# Patient Record
Sex: MALE | Race: WHITE | NOT HISPANIC OR LATINO | Employment: FULL TIME | URBAN - NONMETROPOLITAN AREA
[De-identification: names, ages, dates, MRNs, and addresses within clinical notes are randomized per-mention and may not be internally consistent; named-entity substitution may affect disease eponyms.]

---

## 2017-11-28 ENCOUNTER — OFFICE VISIT (OUTPATIENT)
Dept: URGENT CARE | Facility: PHYSICIAN GROUP | Age: 62
End: 2017-11-28
Payer: OTHER GOVERNMENT

## 2017-11-28 VITALS
BODY MASS INDEX: 45.04 KG/M2 | HEART RATE: 108 BPM | TEMPERATURE: 98.3 F | SYSTOLIC BLOOD PRESSURE: 130 MMHG | WEIGHT: 287 LBS | RESPIRATION RATE: 16 BRPM | OXYGEN SATURATION: 97 % | DIASTOLIC BLOOD PRESSURE: 80 MMHG | HEIGHT: 67 IN

## 2017-11-28 DIAGNOSIS — R05.9 COUGH: ICD-10-CM

## 2017-11-28 PROCEDURE — 99203 OFFICE O/P NEW LOW 30 MIN: CPT | Performed by: PHYSICIAN ASSISTANT

## 2017-11-28 RX ORDER — BENZONATATE 200 MG/1
200 CAPSULE ORAL 3 TIMES DAILY PRN
Qty: 30 CAP | Refills: 0 | Status: SHIPPED | OUTPATIENT
Start: 2017-11-28 | End: 2019-04-24

## 2017-11-28 ASSESSMENT — ENCOUNTER SYMPTOMS
MYALGIAS: 0
SHORTNESS OF BREATH: 0
FEVER: 0
COUGH: 1
CHILLS: 0
WHEEZING: 0

## 2017-11-28 NOTE — PROGRESS NOTES
Subjective:      Jarek Gonzalez is a 62 y.o. male who presents with Cough (x6days congestion)            Cough for the last 6 days or so with mild congestion. No fevers, chills, shortness of breath, wheezing, or other complaints. He has not tried any interventions.      Cough   This is a new problem. The current episode started in the past 7 days. The problem has been waxing and waning. The cough is non-productive. Pertinent negatives include no chills, fever, myalgias, shortness of breath or wheezing. Nothing aggravates the symptoms. He has tried nothing for the symptoms. The treatment provided no relief.       Review of Systems   Constitutional: Negative for chills and fever.   HENT: Positive for congestion.    Respiratory: Positive for cough. Negative for shortness of breath and wheezing.    Musculoskeletal: Negative for myalgias.     Allergies:Patient has no known allergies.    Current Outpatient Prescriptions Ordered in [facility]   Medication Sig Dispense Refill   • benzonatate (TESSALON) 200 MG capsule Take 1 Cap by mouth 3 times a day as needed for Cough. 30 Cap 0   • duloxetine (CYMBALTA) 30 MG CPEP Take 2 Caps by mouth every day. 30 Each 3   • hydrocodone-acetaminophen (NORCO) 5-325 MG TABS per tablet Take 1-2 Tabs by mouth every 6 hours as needed. 240 Each 0   • lisinopril (PRINIVIL, ZESTRIL) 40 MG tablet Take 1 Tab by mouth every day. 90 Each 3   • tramadol (ULTRAM) 50 MG TABS Take 1-2 Tabs by mouth every 8 hours as needed for Mild Pain. 30 Each 0     No current [facility]-ordered facility-administered medications on file.        Past Medical History:   Diagnosis Date   • BPH (benign prostatic hyperplasia)    • Chronic low back pain     DDD   • DDD (degenerative disc disease)    • Depression    • Hypertension    • Morbid obesity (CMS-HCC)    • Nephrolithiasis        Social History   Substance Use Topics   • Smoking status: Never Smoker   • Smokeless tobacco: Never Used   • Alcohol use 0.5 oz/week     1 Shots of  "liquor per week      Comment: day       Family Status   Relation Status   • Mother    • Father      Family History   Problem Relation Age of Onset   • Lung Disease Mother    • Heart Disease Father    • Lung Disease Father    • Alcohol/Drug Father           Objective:     /80   Pulse (!) 108   Temp 36.8 °C (98.3 °F)   Resp 16   Ht 1.702 m (5' 7\")   Wt (!) 130.2 kg (287 lb)   SpO2 97%   BMI 44.95 kg/m²      Physical Exam   Constitutional: He is oriented to person, place, and time. He appears well-developed and well-nourished. No distress.   HENT:   Head: Normocephalic and atraumatic.   Right Ear: External ear normal.   Left Ear: External ear normal.   Mouth/Throat: Oropharynx is clear and moist.   Eyes: Right eye exhibits no discharge. Left eye exhibits no discharge.   Neck: Normal range of motion. Neck supple.   Cardiovascular: Normal rate and regular rhythm.    Pulmonary/Chest: Effort normal and breath sounds normal. He has no wheezes. He has no rales.   Neurological: He is alert and oriented to person, place, and time.   Skin: Skin is warm and dry. He is not diaphoretic.   Psychiatric: He has a normal mood and affect. His behavior is normal. Judgment and thought content normal.   Nursing note and vitals reviewed.              Assessment/Plan:     1. Cough  benzonatate (TESSALON) 200 MG capsule    Ongoing for a few days. No shortness of breath or wheezing. Lungs clear. Given Tessalon. Follow-up with PCP       [employer] Interactive Patient Education given: Cough    Please note that this dictation was created using voice recognition software. I have made every reasonable attempt to correct obvious errors, but I expect that there are errors of grammar and possibly content that I did not discover before finalizing the note.    "

## 2017-11-28 NOTE — PATIENT INSTRUCTIONS
Cough, Adult   A cough is a reflex that helps clear your throat and airways. It can help heal the body or may be a reaction to an irritated airway. A cough may only last 2 or 3 weeks (acute) or may last more than 8 weeks (chronic).   CAUSES  Acute cough:  · Viral or bacterial infections.  Chronic cough:  · Infections.  · Allergies.  · Asthma.  · Post-nasal drip.  · Smoking.  · Heartburn or acid reflux.  · Some medicines.  · Chronic lung problems (COPD).  · Cancer.  SYMPTOMS   · Cough.  · Fever.  · Chest pain.  · Increased breathing rate.  · High-pitched whistling sound when breathing (wheezing).  · Colored mucus that you cough up (sputum).  TREATMENT   · A bacterial cough may be treated with antibiotic medicine.  · A viral cough must run its course and will not respond to antibiotics.  · Your caregiver may recommend other treatments if you have a chronic cough.  HOME CARE INSTRUCTIONS   · Only take over-the-counter or prescription medicines for pain, discomfort, or fever as directed by your caregiver. Use cough suppressants only as directed by your caregiver.  · Use a cold steam vaporizer or humidifier in your bedroom or home to help loosen secretions.  · Sleep in a semi-upright position if your cough is worse at night.  · Rest as needed.  · Stop smoking if you smoke.  SEEK IMMEDIATE MEDICAL CARE IF:   · You have pus in your sputum.  · Your cough starts to worsen.  · You cannot control your cough with suppressants and are losing sleep.  · You begin coughing up blood.  · You have difficulty breathing.  · You develop pain which is getting worse or is uncontrolled with medicine.  · You have a fever.  MAKE SURE YOU:   · Understand these instructions.  · Will watch your condition.  · Will get help right away if you are not doing well or get worse.     This information is not intended to replace advice given to you by your health care provider. Make sure you discuss any questions you have with your health care provider.      Document Released: 06/15/2012 Document Revised: 03/11/2013 Document Reviewed: 02/24/2016  [employer] Interactive Patient Education ©2016 [employer]

## 2019-02-21 ENCOUNTER — HOSPITAL ENCOUNTER (EMERGENCY)
Dept: HOSPITAL 8 - ED | Age: 64
Discharge: LEFT BEFORE DISCHARGE | End: 2019-02-21
Payer: OTHER GOVERNMENT

## 2019-02-21 VITALS — BODY MASS INDEX: 43.44 KG/M2 | WEIGHT: 286.6 LBS | HEIGHT: 68 IN

## 2019-02-21 VITALS — SYSTOLIC BLOOD PRESSURE: 98 MMHG | DIASTOLIC BLOOD PRESSURE: 52 MMHG

## 2019-02-21 DIAGNOSIS — F10.20: ICD-10-CM
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
## 2019-02-21 DIAGNOSIS — R06.89: ICD-10-CM

## 2019-02-21 DIAGNOSIS — R55: Primary | ICD-10-CM

## 2019-02-21 DIAGNOSIS — N18.9: ICD-10-CM

## 2019-02-21 DIAGNOSIS — E11.22: ICD-10-CM

## 2019-02-21 DIAGNOSIS — R53.1: ICD-10-CM

## 2019-02-21 LAB
ALBUMIN SERPL-MCNC: 3.3 G/DL (ref 3.4–5)
ALP SERPL-CCNC: 96 U/L (ref 45–117)
ALT SERPL-CCNC: 50 U/L (ref 12–78)
ANION GAP SERPL CALC-SCNC: 15 MMOL/L (ref 5–15)
APTT BLD: 29 SECONDS (ref 25–31)
BASOPHILS # BLD AUTO: 0.02 X10^3/UL (ref 0–0.1)
BASOPHILS NFR BLD AUTO: 0 % (ref 0–1)
BILIRUB SERPL-MCNC: 1.2 MG/DL (ref 0.2–1)
CALCIUM SERPL-MCNC: 8.4 MG/DL (ref 8.5–10.1)
CHLORIDE SERPL-SCNC: 93 MMOL/L (ref 98–107)
CREAT SERPL-MCNC: 2.23 MG/DL (ref 0.7–1.3)
CULTURE INDICATED?: NO
EOSINOPHIL # BLD AUTO: 0.03 X10^3/UL (ref 0–0.4)
EOSINOPHIL NFR BLD AUTO: 0 % (ref 1–7)
ERYTHROCYTE [DISTWIDTH] IN BLOOD BY AUTOMATED COUNT: 14.3 % (ref 9.4–14.8)
INR PPP: 1.13 (ref 0.93–1.1)
LYMPHOCYTES # BLD AUTO: 0.54 X10^3/UL (ref 1–3.4)
LYMPHOCYTES NFR BLD AUTO: 6 % (ref 22–44)
MCH RBC QN AUTO: 33.8 PG (ref 27.5–34.5)
MCHC RBC AUTO-ENTMCNC: 33.5 G/DL (ref 33.2–36.2)
MCV RBC AUTO: 100.7 FL (ref 81–97)
MD: NO
MICROSCOPIC: (no result)
MONOCYTES # BLD AUTO: 0.56 X10^3/UL (ref 0.2–0.8)
MONOCYTES NFR BLD AUTO: 6 % (ref 2–9)
NEUTROPHILS # BLD AUTO: 8.34 X10^3/UL (ref 1.8–6.8)
NEUTROPHILS NFR BLD AUTO: 88 % (ref 42–75)
PLATELET # BLD AUTO: 262 X10^3/UL (ref 130–400)
PMV BLD AUTO: 7.9 FL (ref 7.4–10.4)
PROT SERPL-MCNC: 7.1 G/DL (ref 6.4–8.2)
PROTHROMBIN TIME: 11.9 SECONDS (ref 9.6–11.5)
RBC # BLD AUTO: 4.04 X10^6/UL (ref 4.38–5.82)
TROPONIN I SERPL-MCNC: 0.05 NG/ML (ref 0–0.04)

## 2019-02-21 PROCEDURE — 84484 ASSAY OF TROPONIN QUANT: CPT

## 2019-02-21 PROCEDURE — 83880 ASSAY OF NATRIURETIC PEPTIDE: CPT

## 2019-02-21 PROCEDURE — 85610 PROTHROMBIN TIME: CPT

## 2019-02-21 PROCEDURE — 81003 URINALYSIS AUTO W/O SCOPE: CPT

## 2019-02-21 PROCEDURE — 36415 COLL VENOUS BLD VENIPUNCTURE: CPT

## 2019-02-21 PROCEDURE — 85730 THROMBOPLASTIN TIME PARTIAL: CPT

## 2019-02-21 PROCEDURE — 71045 X-RAY EXAM CHEST 1 VIEW: CPT

## 2019-02-21 PROCEDURE — 93005 ELECTROCARDIOGRAM TRACING: CPT

## 2019-02-21 PROCEDURE — 80053 COMPREHEN METABOLIC PANEL: CPT

## 2019-02-21 PROCEDURE — 99284 EMERGENCY DEPT VISIT MOD MDM: CPT

## 2019-02-21 PROCEDURE — 76700 US EXAM ABDOM COMPLETE: CPT

## 2019-02-21 PROCEDURE — 85025 COMPLETE CBC W/AUTO DIFF WBC: CPT

## 2019-02-21 PROCEDURE — 83690 ASSAY OF LIPASE: CPT

## 2019-04-18 ENCOUNTER — OFFICE VISIT (OUTPATIENT)
Dept: URGENT CARE | Facility: PHYSICIAN GROUP | Age: 64
End: 2019-04-18
Payer: OTHER GOVERNMENT

## 2019-04-18 VITALS
WEIGHT: 263 LBS | OXYGEN SATURATION: 92 % | TEMPERATURE: 97.9 F | BODY MASS INDEX: 41.28 KG/M2 | RESPIRATION RATE: 18 BRPM | HEIGHT: 67 IN | DIASTOLIC BLOOD PRESSURE: 82 MMHG | HEART RATE: 83 BPM | SYSTOLIC BLOOD PRESSURE: 128 MMHG

## 2019-04-18 DIAGNOSIS — H10.33 ACUTE CONJUNCTIVITIS OF BOTH EYES, UNSPECIFIED ACUTE CONJUNCTIVITIS TYPE: ICD-10-CM

## 2019-04-18 DIAGNOSIS — H57.89 EYE IRRITATION: ICD-10-CM

## 2019-04-18 PROCEDURE — 99214 OFFICE O/P EST MOD 30 MIN: CPT | Performed by: PHYSICIAN ASSISTANT

## 2019-04-18 RX ORDER — POLYMYXIN B SULFATE AND TRIMETHOPRIM 1; 10000 MG/ML; [USP'U]/ML
1 SOLUTION OPHTHALMIC EVERY 4 HOURS
Qty: 10 ML | Refills: 0 | Status: SHIPPED | OUTPATIENT
Start: 2019-04-18 | End: 2019-04-24

## 2019-04-18 ASSESSMENT — ENCOUNTER SYMPTOMS
EYE DISCHARGE: 1
NAUSEA: 0
PHOTOPHOBIA: 0
EYE PAIN: 0
BLURRED VISION: 0
ABDOMINAL PAIN: 0
WHEEZING: 0
SPUTUM PRODUCTION: 0
DIARRHEA: 0
SORE THROAT: 0
SHORTNESS OF BREATH: 0
VOMITING: 0
EYE REDNESS: 0
CHILLS: 0
FEVER: 0
DOUBLE VISION: 0
COUGH: 0

## 2019-04-18 ASSESSMENT — VISUAL ACUITY
OD_CC: 20/40
OS_CC: 20/40

## 2019-04-18 NOTE — PROGRESS NOTES
"Subjective:   Jarek Gonzalez is a 64 y.o. male who presents for Eye Drainage (is using Visine Allergy Eye Drops) and Burning Eyes         Eye Drainage    Associated symptoms include congestion. Pertinent negatives include no abdominal pain, chills, coughing, fever, nausea, rash, sore throat or vomiting.     Pt seen in the , c/o irritation / burning to bilateral eyes, did speak to pharmacist who recommended trial of lubricating drops, this helped w/ irritation/itch and redness but purulent discharge perists, notes matting of eyelids each am w/ green / yellow d/c - denies vision changes or eye pain. Denies photosensitivity. Pt denies known exposure to pink eye or children.     Review of Systems   Constitutional: Negative for chills and fever.   HENT: Positive for congestion. Negative for ear pain and sore throat.    Eyes: Positive for discharge. Negative for blurred vision, double vision, photophobia, pain and redness.        Itchy   Respiratory: Negative for cough, sputum production, shortness of breath and wheezing.    Gastrointestinal: Negative for abdominal pain, diarrhea, nausea and vomiting.   Skin: Negative for rash.   Endo/Heme/Allergies: Positive for environmental allergies.     No Known Allergies   I have worn a mask for the entire encounter with this patient.    Objective:   /82   Pulse 83   Temp 36.6 °C (97.9 °F) (Temporal)   Resp 18   Ht 1.695 m (5' 6.75\")   Wt 119.3 kg (263 lb)   SpO2 92%   BMI 41.50 kg/m²    Physical Exam   Constitutional: He is oriented to person, place, and time. He appears well-developed and well-nourished. No distress.   HENT:   Head: Normocephalic and atraumatic.   Right Ear: Tympanic membrane, external ear and ear canal normal.   Left Ear: Tympanic membrane, external ear and ear canal normal.   Nose: Nose normal.   Mouth/Throat: Uvula is midline and mucous membranes are normal. Posterior oropharyngeal erythema ( mild PND ) present. No oropharyngeal exudate, " posterior oropharyngeal edema or tonsillar abscesses.   Eyes: Pupils are equal, round, and reactive to light. Conjunctivae, EOM and lids are normal. Right eye exhibits exudate. Right eye exhibits no chemosis, no discharge and no hordeolum. No foreign body present in the right eye. Left eye exhibits exudate. Left eye exhibits no chemosis, no discharge and no hordeolum. No foreign body present in the left eye. Right conjunctiva has no hemorrhage. Left conjunctiva has no hemorrhage. No scleral icterus.   Neck: Neck supple.   Pulmonary/Chest: Effort normal. No respiratory distress. He has no decreased breath sounds. He has no wheezes. He has no rhonchi. He has no rales.   Musculoskeletal: Normal range of motion.   Lymphadenopathy:     He has no cervical adenopathy.   Neurological: He is alert and oriented to person, place, and time. Coordination normal.   Skin: Skin is warm and dry. He is not diaphoretic. No pallor.   Psychiatric: He has a normal mood and affect.   Nursing note and vitals reviewed.        Assessment/Plan:   1. Eye irritation  - REFERRAL TO OPHTHALMOLOGY    2. Acute conjunctivitis of both eyes, unspecified acute conjunctivitis type  - polymixin-trimethoprim (POLYTRIM) 56508-3.1 UNIT/ML-% Solution; Place 1 Drop in both eyes every 4 hours for 7 days.  Dispense: 10 mL; Refill: 0  Supportive care is reviewed with patient/caregiver - recommend to push PO fluids and electrolytes, suspect simple conjunctivitis based Patient's description of recent symptoms -somewhat atypical with less conjunctival injection -certainly not classic appearance or overtly concerned but thoughtful of a heliotrope eruption -again suspect bacterial conjunctivitis will treat accordingly -patient to follow-up with ophthalmology with lack of resolution     Return to clinic with lack of resolution or progression of symptoms.  ER precautions with any worsening symptoms are reviewed with patient/caregiver and they do express  understanding    Differential diagnosis, natural history, supportive care, and indications for immediate follow-up discussed.

## 2019-04-24 ENCOUNTER — APPOINTMENT (OUTPATIENT)
Dept: RADIOLOGY | Facility: MEDICAL CENTER | Age: 64
DRG: 896 | End: 2019-04-24
Attending: EMERGENCY MEDICINE

## 2019-04-24 ENCOUNTER — HOSPITAL ENCOUNTER (INPATIENT)
Facility: MEDICAL CENTER | Age: 64
LOS: 9 days | DRG: 896 | End: 2019-05-03
Attending: EMERGENCY MEDICINE | Admitting: INTERNAL MEDICINE
Payer: OTHER GOVERNMENT
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
## 2019-04-24 DIAGNOSIS — I48.91 NEW ONSET ATRIAL FIBRILLATION (HCC): ICD-10-CM

## 2019-04-24 DIAGNOSIS — R79.89 ELEVATED TROPONIN: ICD-10-CM

## 2019-04-24 DIAGNOSIS — S00.83XA CONTUSION OF FACE, INITIAL ENCOUNTER: ICD-10-CM

## 2019-04-24 DIAGNOSIS — I48.91 ATRIAL FIBRILLATION WITH RVR (HCC): ICD-10-CM

## 2019-04-24 DIAGNOSIS — I10 ESSENTIAL HYPERTENSION: ICD-10-CM

## 2019-04-24 DIAGNOSIS — F10.920 ALCOHOLIC INTOXICATION WITHOUT COMPLICATION (HCC): ICD-10-CM

## 2019-04-24 DIAGNOSIS — S09.90XA CLOSED HEAD INJURY, INITIAL ENCOUNTER: ICD-10-CM

## 2019-04-24 DIAGNOSIS — S22.42XA CLOSED FRACTURE OF MULTIPLE RIBS OF LEFT SIDE, INITIAL ENCOUNTER: ICD-10-CM

## 2019-04-24 PROBLEM — R91.1 LUNG NODULE: Status: ACTIVE | Noted: 2019-04-24

## 2019-04-24 PROBLEM — E87.6 HYPOKALEMIA: Status: ACTIVE | Noted: 2019-04-24

## 2019-04-24 PROBLEM — E11.42 TYPE 2 DIABETES MELLITUS WITH DIABETIC POLYNEUROPATHY, WITHOUT LONG-TERM CURRENT USE OF INSULIN (HCC): Status: ACTIVE | Noted: 2019-04-24

## 2019-04-24 PROBLEM — S22.42XD CLOSED FRACTURE OF MULTIPLE RIBS OF LEFT SIDE WITH ROUTINE HEALING: Status: ACTIVE | Noted: 2019-04-24

## 2019-04-24 PROBLEM — R74.01 TRANSAMINITIS: Status: ACTIVE | Noted: 2019-04-24

## 2019-04-24 PROBLEM — R65.10 SIRS (SYSTEMIC INFLAMMATORY RESPONSE SYNDROME) (HCC): Status: ACTIVE | Noted: 2019-04-24

## 2019-04-24 PROBLEM — E87.29 INCREASED ANION GAP METABOLIC ACIDOSIS: Status: ACTIVE | Noted: 2019-04-24

## 2019-04-24 LAB
ALBUMIN SERPL BCP-MCNC: 3.8 G/DL (ref 3.2–4.9)
ALBUMIN/GLOB SERPL: 1.1 G/DL
ALP SERPL-CCNC: 91 U/L (ref 30–99)
ALT SERPL-CCNC: 52 U/L (ref 2–50)
ANION GAP SERPL CALC-SCNC: 19 MMOL/L (ref 0–11.9)
APPEARANCE UR: CLEAR
APTT PPP: 29.9 SEC (ref 24.7–36)
AST SERPL-CCNC: 111 U/L (ref 12–45)
B-OH-BUTYR SERPL-MCNC: 0.38 MMOL/L (ref 0.02–0.27)
BASOPHILS # BLD AUTO: 0.9 % (ref 0–1.8)
BASOPHILS # BLD: 0.06 K/UL (ref 0–0.12)
BILIRUB SERPL-MCNC: 1.2 MG/DL (ref 0.1–1.5)
BILIRUB UR QL STRIP.AUTO: NEGATIVE
BLOOD CULTURE HOLD CXBCH: NORMAL
BLOOD CULTURE HOLD CXBCH: NORMAL
BUN SERPL-MCNC: 3 MG/DL (ref 8–22)
CALCIUM SERPL-MCNC: 7.7 MG/DL (ref 8.5–10.5)
CHLORIDE SERPL-SCNC: 101 MMOL/L (ref 96–112)
CK SERPL-CCNC: 54 U/L (ref 0–154)
CO2 SERPL-SCNC: 18 MMOL/L (ref 20–33)
COLOR UR: YELLOW
CREAT SERPL-MCNC: 0.64 MG/DL (ref 0.5–1.4)
EKG IMPRESSION: NORMAL
EOSINOPHIL # BLD AUTO: 0.01 K/UL (ref 0–0.51)
EOSINOPHIL NFR BLD: 0.1 % (ref 0–6.9)
ERYTHROCYTE [DISTWIDTH] IN BLOOD BY AUTOMATED COUNT: 52.1 FL (ref 35.9–50)
ETHANOL BLD-MCNC: 0.15 G/DL
GLOBULIN SER CALC-MCNC: 3.4 G/DL (ref 1.9–3.5)
GLUCOSE BLD-MCNC: 129 MG/DL (ref 65–99)
GLUCOSE BLD-MCNC: 130 MG/DL (ref 65–99)
GLUCOSE SERPL-MCNC: 192 MG/DL (ref 65–99)
GLUCOSE UR STRIP.AUTO-MCNC: NEGATIVE MG/DL
HCT VFR BLD AUTO: 45.3 % (ref 42–52)
HGB BLD-MCNC: 15.3 G/DL (ref 14–18)
IMM GRANULOCYTES # BLD AUTO: 0.05 K/UL (ref 0–0.11)
IMM GRANULOCYTES NFR BLD AUTO: 0.7 % (ref 0–0.9)
INR PPP: 1.18 (ref 0.87–1.13)
KETONES UR STRIP.AUTO-MCNC: NEGATIVE MG/DL
LACTATE BLD-SCNC: 3.6 MMOL/L (ref 0.5–2)
LEUKOCYTE ESTERASE UR QL STRIP.AUTO: NEGATIVE
LIPASE SERPL-CCNC: 43 U/L (ref 11–82)
LYMPHOCYTES # BLD AUTO: 1.37 K/UL (ref 1–4.8)
LYMPHOCYTES NFR BLD: 20.3 % (ref 22–41)
MCH RBC QN AUTO: 32.9 PG (ref 27–33)
MCHC RBC AUTO-ENTMCNC: 33.8 G/DL (ref 33.7–35.3)
MCV RBC AUTO: 97.4 FL (ref 81.4–97.8)
MICRO URNS: ABNORMAL
MONOCYTES # BLD AUTO: 0.67 K/UL (ref 0–0.85)
MONOCYTES NFR BLD AUTO: 9.9 % (ref 0–13.4)
NEUTROPHILS # BLD AUTO: 4.59 K/UL (ref 1.82–7.42)
NEUTROPHILS NFR BLD: 68.1 % (ref 44–72)
NITRITE UR QL STRIP.AUTO: NEGATIVE
NRBC # BLD AUTO: 0 K/UL
NRBC BLD-RTO: 0 /100 WBC
PH UR STRIP.AUTO: 6.5 [PH]
PLATELET # BLD AUTO: 214 K/UL (ref 164–446)
PMV BLD AUTO: 9.6 FL (ref 9–12.9)
POTASSIUM SERPL-SCNC: 3.2 MMOL/L (ref 3.6–5.5)
PROT SERPL-MCNC: 7.2 G/DL (ref 6–8.2)
PROT UR QL STRIP: NEGATIVE MG/DL
PROTHROMBIN TIME: 15.1 SEC (ref 12–14.6)
RBC # BLD AUTO: 4.65 M/UL (ref 4.7–6.1)
RBC UR QL AUTO: NEGATIVE
SODIUM SERPL-SCNC: 138 MMOL/L (ref 135–145)
SP GR UR STRIP.AUTO: >=1.045
TROPONIN I SERPL-MCNC: 0.11 NG/ML (ref 0–0.04)
TROPONIN I SERPL-MCNC: 0.16 NG/ML (ref 0–0.04)
TSH SERPL DL<=0.005 MIU/L-ACNC: 0.87 UIU/ML (ref 0.38–5.33)
UROBILINOGEN UR STRIP.AUTO-MCNC: 1 MG/DL
WBC # BLD AUTO: 6.8 K/UL (ref 4.8–10.8)

## 2019-04-24 PROCEDURE — 700117 HCHG RX CONTRAST REV CODE 255: Performed by: EMERGENCY MEDICINE

## 2019-04-24 PROCEDURE — 96372 THER/PROPH/DIAG INJ SC/IM: CPT | Mod: XU

## 2019-04-24 PROCEDURE — 93005 ELECTROCARDIOGRAM TRACING: CPT

## 2019-04-24 PROCEDURE — HZ2ZZZZ DETOXIFICATION SERVICES FOR SUBSTANCE ABUSE TREATMENT: ICD-10-PCS | Performed by: INTERNAL MEDICINE

## 2019-04-24 PROCEDURE — 700111 HCHG RX REV CODE 636 W/ 250 OVERRIDE (IP): Performed by: EMERGENCY MEDICINE

## 2019-04-24 PROCEDURE — 700101 HCHG RX REV CODE 250: Performed by: INTERNAL MEDICINE

## 2019-04-24 PROCEDURE — 82962 GLUCOSE BLOOD TEST: CPT

## 2019-04-24 PROCEDURE — 83605 ASSAY OF LACTIC ACID: CPT

## 2019-04-24 PROCEDURE — 96376 TX/PRO/DX INJ SAME DRUG ADON: CPT

## 2019-04-24 PROCEDURE — 85025 COMPLETE CBC W/AUTO DIFF WBC: CPT

## 2019-04-24 PROCEDURE — 700111 HCHG RX REV CODE 636 W/ 250 OVERRIDE (IP)

## 2019-04-24 PROCEDURE — 80307 DRUG TEST PRSMV CHEM ANLYZR: CPT

## 2019-04-24 PROCEDURE — 81003 URINALYSIS AUTO W/O SCOPE: CPT

## 2019-04-24 PROCEDURE — 80053 COMPREHEN METABOLIC PANEL: CPT

## 2019-04-24 PROCEDURE — 84443 ASSAY THYROID STIM HORMONE: CPT

## 2019-04-24 PROCEDURE — 99223 1ST HOSP IP/OBS HIGH 75: CPT | Performed by: INTERNAL MEDICINE

## 2019-04-24 PROCEDURE — A9270 NON-COVERED ITEM OR SERVICE: HCPCS | Performed by: EMERGENCY MEDICINE

## 2019-04-24 PROCEDURE — 96375 TX/PRO/DX INJ NEW DRUG ADDON: CPT

## 2019-04-24 PROCEDURE — 96367 TX/PROPH/DG ADDL SEQ IV INF: CPT

## 2019-04-24 PROCEDURE — 94760 N-INVAS EAR/PLS OXIMETRY 1: CPT

## 2019-04-24 PROCEDURE — 83690 ASSAY OF LIPASE: CPT

## 2019-04-24 PROCEDURE — 84484 ASSAY OF TROPONIN QUANT: CPT

## 2019-04-24 PROCEDURE — 82010 KETONE BODYS QUAN: CPT

## 2019-04-24 PROCEDURE — 93005 ELECTROCARDIOGRAM TRACING: CPT | Performed by: EMERGENCY MEDICINE

## 2019-04-24 PROCEDURE — 770020 HCHG ROOM/CARE - TELE (206)

## 2019-04-24 PROCEDURE — 72125 CT NECK SPINE W/O DYE: CPT

## 2019-04-24 PROCEDURE — A9270 NON-COVERED ITEM OR SERVICE: HCPCS | Performed by: INTERNAL MEDICINE

## 2019-04-24 PROCEDURE — 700102 HCHG RX REV CODE 250 W/ 637 OVERRIDE(OP): Performed by: INTERNAL MEDICINE

## 2019-04-24 PROCEDURE — 71260 CT THORAX DX C+: CPT

## 2019-04-24 PROCEDURE — 700111 HCHG RX REV CODE 636 W/ 250 OVERRIDE (IP): Performed by: INTERNAL MEDICINE

## 2019-04-24 PROCEDURE — 99285 EMERGENCY DEPT VISIT HI MDM: CPT

## 2019-04-24 PROCEDURE — 700101 HCHG RX REV CODE 250: Performed by: EMERGENCY MEDICINE

## 2019-04-24 PROCEDURE — 700105 HCHG RX REV CODE 258: Performed by: INTERNAL MEDICINE

## 2019-04-24 PROCEDURE — 96366 THER/PROPH/DIAG IV INF ADDON: CPT

## 2019-04-24 PROCEDURE — 82550 ASSAY OF CK (CPK): CPT

## 2019-04-24 PROCEDURE — 96365 THER/PROPH/DIAG IV INF INIT: CPT

## 2019-04-24 PROCEDURE — 70450 CT HEAD/BRAIN W/O DYE: CPT

## 2019-04-24 PROCEDURE — 85730 THROMBOPLASTIN TIME PARTIAL: CPT

## 2019-04-24 PROCEDURE — 85610 PROTHROMBIN TIME: CPT

## 2019-04-24 PROCEDURE — 700102 HCHG RX REV CODE 250 W/ 637 OVERRIDE(OP): Performed by: EMERGENCY MEDICINE

## 2019-04-24 RX ORDER — DULOXETIN HYDROCHLORIDE 60 MG/1
60 CAPSULE, DELAYED RELEASE ORAL DAILY
Status: DISCONTINUED | OUTPATIENT
Start: 2019-04-25 | End: 2019-05-03 | Stop reason: HOSPADM

## 2019-04-24 RX ORDER — LORAZEPAM 2 MG/ML
0.5 INJECTION INTRAMUSCULAR EVERY 4 HOURS PRN
Status: DISCONTINUED | OUTPATIENT
Start: 2019-04-24 | End: 2019-04-28

## 2019-04-24 RX ORDER — PROMETHAZINE HYDROCHLORIDE 25 MG/1
12.5-25 SUPPOSITORY RECTAL EVERY 4 HOURS PRN
Status: DISCONTINUED | OUTPATIENT
Start: 2019-04-24 | End: 2019-05-03 | Stop reason: HOSPADM

## 2019-04-24 RX ORDER — LORAZEPAM 2 MG/1
4 TABLET ORAL
Status: DISCONTINUED | OUTPATIENT
Start: 2019-04-24 | End: 2019-04-28

## 2019-04-24 RX ORDER — LORAZEPAM 2 MG/ML
1 INJECTION INTRAMUSCULAR
Status: DISCONTINUED | OUTPATIENT
Start: 2019-04-24 | End: 2019-04-28

## 2019-04-24 RX ORDER — ONDANSETRON 2 MG/ML
4 INJECTION INTRAMUSCULAR; INTRAVENOUS ONCE
Status: COMPLETED | OUTPATIENT
Start: 2019-04-24 | End: 2019-04-24

## 2019-04-24 RX ORDER — METOPROLOL TARTRATE 50 MG/1
50 TABLET, FILM COATED ORAL TWICE DAILY
Status: DISCONTINUED | OUTPATIENT
Start: 2019-04-24 | End: 2019-04-25

## 2019-04-24 RX ORDER — MORPHINE SULFATE 4 MG/ML
2 INJECTION, SOLUTION INTRAMUSCULAR; INTRAVENOUS
Status: DISCONTINUED | OUTPATIENT
Start: 2019-04-24 | End: 2019-04-28

## 2019-04-24 RX ORDER — ASPIRIN 81 MG/1
81 TABLET, CHEWABLE ORAL ONCE
Status: COMPLETED | OUTPATIENT
Start: 2019-04-24 | End: 2019-04-24

## 2019-04-24 RX ORDER — LORAZEPAM 2 MG/ML
2 INJECTION INTRAMUSCULAR
Status: DISCONTINUED | OUTPATIENT
Start: 2019-04-24 | End: 2019-04-28

## 2019-04-24 RX ORDER — METOPROLOL TARTRATE 1 MG/ML
5 INJECTION, SOLUTION INTRAVENOUS ONCE
Status: COMPLETED | OUTPATIENT
Start: 2019-04-24 | End: 2019-04-24

## 2019-04-24 RX ORDER — QUETIAPINE FUMARATE 100 MG/1
100 TABLET, FILM COATED ORAL
COMMUNITY
End: 2023-07-19

## 2019-04-24 RX ORDER — POLYETHYLENE GLYCOL 3350 17 G/17G
1 POWDER, FOR SOLUTION ORAL
Status: DISCONTINUED | OUTPATIENT
Start: 2019-04-24 | End: 2019-04-25 | Stop reason: ALTCHOICE

## 2019-04-24 RX ORDER — METOPROLOL TARTRATE 1 MG/ML
5 INJECTION, SOLUTION INTRAVENOUS EVERY 4 HOURS PRN
Status: DISCONTINUED | OUTPATIENT
Start: 2019-04-24 | End: 2019-05-03 | Stop reason: HOSPADM

## 2019-04-24 RX ORDER — SODIUM CHLORIDE 9 MG/ML
1000 INJECTION, SOLUTION INTRAVENOUS ONCE
Status: DISCONTINUED | OUTPATIENT
Start: 2019-04-24 | End: 2019-04-24

## 2019-04-24 RX ORDER — GABAPENTIN 300 MG/1
900 CAPSULE ORAL 3 TIMES DAILY
Status: DISCONTINUED | OUTPATIENT
Start: 2019-04-24 | End: 2019-05-03 | Stop reason: HOSPADM

## 2019-04-24 RX ORDER — GUAIFENESIN/DEXTROMETHORPHAN 100-10MG/5
10 SYRUP ORAL EVERY 6 HOURS PRN
Status: DISCONTINUED | OUTPATIENT
Start: 2019-04-24 | End: 2019-05-03 | Stop reason: HOSPADM

## 2019-04-24 RX ORDER — MORPHINE SULFATE 4 MG/ML
4 INJECTION, SOLUTION INTRAMUSCULAR; INTRAVENOUS ONCE
Status: COMPLETED | OUTPATIENT
Start: 2019-04-24 | End: 2019-04-24

## 2019-04-24 RX ORDER — AMOXICILLIN 250 MG
2 CAPSULE ORAL 2 TIMES DAILY
Status: DISCONTINUED | OUTPATIENT
Start: 2019-04-24 | End: 2019-05-03 | Stop reason: HOSPADM

## 2019-04-24 RX ORDER — FOLIC ACID 1 MG/1
1 TABLET ORAL DAILY
Status: DISCONTINUED | OUTPATIENT
Start: 2019-04-24 | End: 2019-04-25

## 2019-04-24 RX ORDER — LORAZEPAM 1 MG/1
1 TABLET ORAL EVERY 4 HOURS PRN
Status: DISCONTINUED | OUTPATIENT
Start: 2019-04-24 | End: 2019-04-28

## 2019-04-24 RX ORDER — ONDANSETRON 2 MG/ML
4 INJECTION INTRAMUSCULAR; INTRAVENOUS EVERY 4 HOURS PRN
Status: DISCONTINUED | OUTPATIENT
Start: 2019-04-24 | End: 2019-05-03 | Stop reason: HOSPADM

## 2019-04-24 RX ORDER — ACETAMINOPHEN 325 MG/1
650 TABLET ORAL EVERY 6 HOURS PRN
Status: DISCONTINUED | OUTPATIENT
Start: 2019-04-24 | End: 2019-05-03 | Stop reason: HOSPADM

## 2019-04-24 RX ORDER — SODIUM CHLORIDE 9 MG/ML
2000 INJECTION, SOLUTION INTRAVENOUS ONCE
Status: COMPLETED | OUTPATIENT
Start: 2019-04-24 | End: 2019-04-24

## 2019-04-24 RX ORDER — LORAZEPAM 2 MG/ML
0.5 INJECTION INTRAMUSCULAR ONCE
Status: COMPLETED | OUTPATIENT
Start: 2019-04-24 | End: 2019-04-24

## 2019-04-24 RX ORDER — CLONIDINE HYDROCHLORIDE 0.1 MG/1
0.1 TABLET ORAL
Status: DISCONTINUED | OUTPATIENT
Start: 2019-04-24 | End: 2019-05-03 | Stop reason: HOSPADM

## 2019-04-24 RX ORDER — GABAPENTIN 300 MG/1
900 CAPSULE ORAL 3 TIMES DAILY
Refills: 1 | COMMUNITY
Start: 2019-01-25

## 2019-04-24 RX ORDER — LORAZEPAM 1 MG/1
0.5 TABLET ORAL EVERY 4 HOURS PRN
Status: DISCONTINUED | OUTPATIENT
Start: 2019-04-24 | End: 2019-04-28

## 2019-04-24 RX ORDER — BISACODYL 10 MG
10 SUPPOSITORY, RECTAL RECTAL
Status: DISCONTINUED | OUTPATIENT
Start: 2019-04-24 | End: 2019-04-25 | Stop reason: ALTCHOICE

## 2019-04-24 RX ORDER — LORAZEPAM 2 MG/ML
1.5 INJECTION INTRAMUSCULAR
Status: DISCONTINUED | OUTPATIENT
Start: 2019-04-24 | End: 2019-04-28

## 2019-04-24 RX ORDER — HYDRALAZINE HYDROCHLORIDE 20 MG/ML
10 INJECTION INTRAMUSCULAR; INTRAVENOUS EVERY 4 HOURS PRN
Status: DISCONTINUED | OUTPATIENT
Start: 2019-04-24 | End: 2019-05-03 | Stop reason: HOSPADM

## 2019-04-24 RX ORDER — PROMETHAZINE HYDROCHLORIDE 25 MG/1
12.5-25 TABLET ORAL EVERY 4 HOURS PRN
Status: DISCONTINUED | OUTPATIENT
Start: 2019-04-24 | End: 2019-05-03 | Stop reason: HOSPADM

## 2019-04-24 RX ORDER — LORAZEPAM 2 MG/1
2 TABLET ORAL
Status: DISCONTINUED | OUTPATIENT
Start: 2019-04-24 | End: 2019-04-28

## 2019-04-24 RX ORDER — QUETIAPINE FUMARATE 25 MG/1
100 TABLET, FILM COATED ORAL
Status: DISCONTINUED | OUTPATIENT
Start: 2019-04-24 | End: 2019-05-03 | Stop reason: HOSPADM

## 2019-04-24 RX ORDER — OXYCODONE HYDROCHLORIDE 5 MG/1
5-10 TABLET ORAL EVERY 4 HOURS PRN
Status: DISCONTINUED | OUTPATIENT
Start: 2019-04-24 | End: 2019-05-02

## 2019-04-24 RX ORDER — ONDANSETRON 4 MG/1
4 TABLET, ORALLY DISINTEGRATING ORAL EVERY 4 HOURS PRN
Status: DISCONTINUED | OUTPATIENT
Start: 2019-04-24 | End: 2019-05-03 | Stop reason: HOSPADM

## 2019-04-24 RX ORDER — POTASSIUM CHLORIDE 7.45 MG/ML
10 INJECTION INTRAVENOUS
Status: COMPLETED | OUTPATIENT
Start: 2019-04-24 | End: 2019-04-24

## 2019-04-24 RX ORDER — SODIUM CHLORIDE, SODIUM LACTATE, POTASSIUM CHLORIDE, CALCIUM CHLORIDE 600; 310; 30; 20 MG/100ML; MG/100ML; MG/100ML; MG/100ML
INJECTION, SOLUTION INTRAVENOUS CONTINUOUS
Status: DISCONTINUED | OUTPATIENT
Start: 2019-04-24 | End: 2019-04-27

## 2019-04-24 RX ORDER — THIAMINE MONONITRATE (VIT B1) 100 MG
100 TABLET ORAL DAILY
Status: DISCONTINUED | OUTPATIENT
Start: 2019-04-24 | End: 2019-04-25

## 2019-04-24 RX ADMIN — MORPHINE SULFATE 4 MG: 4 INJECTION INTRAVENOUS at 13:52

## 2019-04-24 RX ADMIN — SENNOSIDES,DOCUSATE SODIUM 2 TABLET: 8.6; 5 TABLET, FILM COATED ORAL at 18:40

## 2019-04-24 RX ADMIN — FENTANYL CITRATE 50 MCG: 50 INJECTION, SOLUTION INTRAMUSCULAR; INTRAVENOUS at 11:02

## 2019-04-24 RX ADMIN — QUETIAPINE FUMARATE 100 MG: 25 TABLET ORAL at 20:40

## 2019-04-24 RX ADMIN — FENTANYL CITRATE 50 MCG: 50 INJECTION, SOLUTION INTRAMUSCULAR; INTRAVENOUS at 12:10

## 2019-04-24 RX ADMIN — LORAZEPAM 1.5 MG: 2 INJECTION INTRAMUSCULAR; INTRAVENOUS at 15:40

## 2019-04-24 RX ADMIN — FOLIC ACID 1 MG: 1 TABLET ORAL at 18:38

## 2019-04-24 RX ADMIN — MORPHINE SULFATE 2 MG: 4 INJECTION INTRAVENOUS at 16:35

## 2019-04-24 RX ADMIN — LORAZEPAM 0.5 MG: 2 INJECTION INTRAMUSCULAR; INTRAVENOUS at 12:10

## 2019-04-24 RX ADMIN — THIAMINE HYDROCHLORIDE: 100 INJECTION, SOLUTION INTRAMUSCULAR; INTRAVENOUS at 11:02

## 2019-04-24 RX ADMIN — OXYCODONE HYDROCHLORIDE 5 MG: 5 TABLET ORAL at 18:39

## 2019-04-24 RX ADMIN — METOPROLOL TARTRATE 50 MG: 50 TABLET ORAL at 18:41

## 2019-04-24 RX ADMIN — GUAIFENESIN AND DEXTROMETHORPHAN 10 ML: 100; 10 SYRUP ORAL at 14:57

## 2019-04-24 RX ADMIN — POTASSIUM CHLORIDE 10 MEQ: 7.46 INJECTION, SOLUTION INTRAVENOUS at 16:36

## 2019-04-24 RX ADMIN — LORAZEPAM 1 MG: 2 INJECTION INTRAMUSCULAR; INTRAVENOUS at 18:41

## 2019-04-24 RX ADMIN — LORAZEPAM 1.5 MG: 2 INJECTION INTRAMUSCULAR; INTRAVENOUS at 14:57

## 2019-04-24 RX ADMIN — METOPROLOL TARTRATE 5 MG: 1 INJECTION, SOLUTION INTRAVENOUS at 13:58

## 2019-04-24 RX ADMIN — METOPROLOL TARTRATE 5 MG: 1 INJECTION, SOLUTION INTRAVENOUS at 16:52

## 2019-04-24 RX ADMIN — Medication 100 MG: at 18:40

## 2019-04-24 RX ADMIN — ASPIRIN 81 MG 81 MG: 81 TABLET ORAL at 18:52

## 2019-04-24 RX ADMIN — POTASSIUM CHLORIDE 10 MEQ: 7.46 INJECTION, SOLUTION INTRAVENOUS at 13:10

## 2019-04-24 RX ADMIN — POTASSIUM CHLORIDE 10 MEQ: 7.46 INJECTION, SOLUTION INTRAVENOUS at 15:27

## 2019-04-24 RX ADMIN — METOPROLOL TARTRATE 25 MG: 25 TABLET ORAL at 14:27

## 2019-04-24 RX ADMIN — GABAPENTIN 900 MG: 300 CAPSULE ORAL at 18:40

## 2019-04-24 RX ADMIN — SODIUM CHLORIDE 2000 ML: 9 INJECTION, SOLUTION INTRAVENOUS at 18:42

## 2019-04-24 RX ADMIN — POTASSIUM CHLORIDE 10 MEQ: 7.46 INJECTION, SOLUTION INTRAVENOUS at 14:25

## 2019-04-24 RX ADMIN — ONDANSETRON 4 MG: 2 INJECTION INTRAMUSCULAR; INTRAVENOUS at 11:02

## 2019-04-24 RX ADMIN — SODIUM CHLORIDE, POTASSIUM CHLORIDE, SODIUM LACTATE AND CALCIUM CHLORIDE: 600; 310; 30; 20 INJECTION, SOLUTION INTRAVENOUS at 20:39

## 2019-04-24 RX ADMIN — LORAZEPAM 0.5 MG: 2 INJECTION INTRAMUSCULAR; INTRAVENOUS at 11:02

## 2019-04-24 RX ADMIN — IOHEXOL 100 ML: 350 INJECTION, SOLUTION INTRAVENOUS at 12:42

## 2019-04-24 RX ADMIN — THERA TABS 1 TABLET: TAB at 18:39

## 2019-04-24 RX ADMIN — ENOXAPARIN SODIUM 40 MG: 100 INJECTION SUBCUTANEOUS at 18:40

## 2019-04-24 ASSESSMENT — ENCOUNTER SYMPTOMS
VOMITING: 0
ABDOMINAL PAIN: 0
COUGH: 1
NAUSEA: 0
MYALGIAS: 1
BLURRED VISION: 0
EYE PAIN: 0
TREMORS: 1
DIZZINESS: 1
PALPITATIONS: 0
SHORTNESS OF BREATH: 0
WEAKNESS: 1

## 2019-04-24 ASSESSMENT — LIFESTYLE VARIABLES
ORIENTATION AND CLOUDING OF SENSORIUM: ORIENTED AND CAN DO SERIAL ADDITIONS
PAROXYSMAL SWEATS: *
HEADACHE, FULLNESS IN HEAD: NOT PRESENT
TREMOR: *
ANXIETY: MODERATELY ANXIOUS OR GUARDED, SO ANXIETY IS INFERRED
AGITATION: NORMAL ACTIVITY
NAUSEA AND VOMITING: NO NAUSEA AND NO VOMITING
ORIENTATION AND CLOUDING OF SENSORIUM: ORIENTED AND CAN DO SERIAL ADDITIONS
VISUAL DISTURBANCES: NOT PRESENT
AUDITORY DISTURBANCES: NOT PRESENT
HEADACHE, FULLNESS IN HEAD: MODERATE
PAROXYSMAL SWEATS: BEADS OF SWEAT OBVIOUS ON FOREHEAD
ORIENTATION AND CLOUDING OF SENSORIUM: ORIENTED AND CAN DO SERIAL ADDITIONS
NAUSEA AND VOMITING: MILD NAUSEA WITH NO VOMITING
TREMOR: MODERATE TREMOR WITH ARMS EXTENDED
ANXIETY: MODERATELY ANXIOUS OR GUARDED, SO ANXIETY IS INFERRED
AGITATION: NORMAL ACTIVITY
HEADACHE, FULLNESS IN HEAD: MODERATE
AUDITORY DISTURBANCES: NOT PRESENT
TOTAL SCORE: 15
NAUSEA AND VOMITING: NO NAUSEA AND NO VOMITING
VISUAL DISTURBANCES: NOT PRESENT
ANXIETY: MILDLY ANXIOUS
TOTAL SCORE: 6
AUDITORY DISTURBANCES: NOT PRESENT
NAUSEA AND VOMITING: NO NAUSEA AND NO VOMITING
VISUAL DISTURBANCES: NOT PRESENT
ANXIETY: MILDLY ANXIOUS
AGITATION: *
NAUSEA AND VOMITING: NO NAUSEA AND NO VOMITING
PAROXYSMAL SWEATS: BEADS OF SWEAT OBVIOUS ON FOREHEAD
AGITATION: MODERATELY FIDGETY AND RESTLESS
PAROXYSMAL SWEATS: BARELY PERCEPTIBLE SWEATING. PALMS MOIST
AGITATION: SOMEWHAT MORE THAN NORMAL ACTIVITY
AUDITORY DISTURBANCES: NOT PRESENT
NAUSEA AND VOMITING: NO NAUSEA AND NO VOMITING
HEADACHE, FULLNESS IN HEAD: NOT PRESENT
VISUAL DISTURBANCES: NOT PRESENT
AUDITORY DISTURBANCES: NOT PRESENT
ANXIETY: MODERATELY ANXIOUS OR GUARDED, SO ANXIETY IS INFERRED
HEADACHE, FULLNESS IN HEAD: NOT PRESENT
AGITATION: MODERATELY FIDGETY AND RESTLESS
VISUAL DISTURBANCES: NOT PRESENT
ORIENTATION AND CLOUDING OF SENSORIUM: ORIENTED AND CAN DO SERIAL ADDITIONS
TOTAL SCORE: 3
TOTAL SCORE: 7
ANXIETY: *
PAROXYSMAL SWEATS: NO SWEAT VISIBLE
AUDITORY DISTURBANCES: NOT PRESENT
VISUAL DISTURBANCES: NOT PRESENT
ORIENTATION AND CLOUDING OF SENSORIUM: ORIENTED AND CAN DO SERIAL ADDITIONS
TOTAL SCORE: 19
TREMOR: MODERATE TREMOR WITH ARMS EXTENDED
TOTAL SCORE: 14
TREMOR: TREMOR NOT VISIBLE BUT CAN BE FELT, FINGERTIP TO FINGERTIP
TREMOR: TREMOR NOT VISIBLE BUT CAN BE FELT, FINGERTIP TO FINGERTIP
ORIENTATION AND CLOUDING OF SENSORIUM: ORIENTED AND CAN DO SERIAL ADDITIONS
PAROXYSMAL SWEATS: NO SWEAT VISIBLE
TREMOR: *
HEADACHE, FULLNESS IN HEAD: NOT PRESENT

## 2019-04-24 NOTE — ASSESSMENT & PLAN NOTE
New diagnosis of alcohol intoxication.  Patient does not have sensation of palpitations , unknown duration.  Echo preserved LV function  A. fib RVR -improved--continue Lopressor  Benefits versus risk of anticoagulation were discussed with patient.  Patient situation complicated by frequent falls, active alcoholism, poor compliance resulting in significantly higher bleed risk.     Continue aspirin therapy  Outpatient referral to cardiology  Monitor telemetry

## 2019-04-24 NOTE — ED TRIAGE NOTES
Pt biba for glf last night after drinking- last drink 10pm, pt drinks 1 bottle of whiskey/vodka daily, pt fell at 1am over toilet, pt has bruised left eye with c/o pain to left upper abdomen.  Per pt landed over toilet sideways.  Pt is alert and oriented, pt per ems started with afib rvr rate between 110-150, pt given 2 liters NS rate primary stayed around 110-120.  Pt received 324 asa in route as well.  Pt has multiple complaints during assessment  As followed: red area around panus, received anx for possible conjuctvitis would like to be re seen for that, pt has pain upper GI  Pt denies hx of afib, pt is alert andoriented moves all ext at this time

## 2019-04-24 NOTE — ED NOTES
Med rec updated and complete.  Allergies reviewed.  Pt denies oral antibiotic use in last 30 days at home. Pt  Took no medications today.  Interviewed pt at bedside.

## 2019-04-24 NOTE — ASSESSMENT & PLAN NOTE
High AST/ALT ratio, mild-likely alcoholic hepatitis.  Ultrasound findings of hepatocellular dysfunction  Monitor liver enzymes  Advised abstinence from alcohol

## 2019-04-24 NOTE — ASSESSMENT & PLAN NOTE
Troponin indeterminate range-trending down.  Likely from A. fib RVR, demand.  Echo preserved LV function.  Telemetry  Follow clinically  Left chest pain is due to rib fractures.

## 2019-04-24 NOTE — ED NOTES
Bedside report received, assumed care of patient. Updated whiteboard and introduced myself to patient. Updated patient and family on POC, repositioned patient in bed, no other needs at this time

## 2019-04-24 NOTE — ASSESSMENT & PLAN NOTE
Withdrawal symptoms resolving.  Scheduled Neurontin  Thiamine, folate, multivitamin  Diet as tolerated  Continue PT/OT  As needed Ativan for anxiety    4/30 not in withdrawal  States that he would like to participate in rehab programs  [facility]    5/2 interested in participation in rehab program   to assist, to provide additional resources  Encourage activity and ambulation  Patient is not a candidate for home health, not homebound  Plans to return to work  Work note provided

## 2019-04-24 NOTE — ASSESSMENT & PLAN NOTE
With hyperglycemia, labile blood sugars  Insulin sliding scale as needed.  Continue home gabapentin  Diabetic diet  Continue metformin

## 2019-04-24 NOTE — ED NOTES
Report from ALEXANDRE Mar. Family and patient updated on POC. Patient moved in bed in a position of comfort.

## 2019-04-24 NOTE — ASSESSMENT & PLAN NOTE
Continue with beta-blocker  As needed clonidine for withdrawal benefits  Monitor BP    lisinopril, adjust  -echo EF of 70 %

## 2019-04-24 NOTE — ED PROVIDER NOTES
ED Provider Note    CHIEF COMPLAINT  Chief Complaint   Patient presents with   • ETOH Intoxication   • Fall   • Atrial Fibrillation       HPI  Jarek Gonzalez is a 64 y.o. male who presents to the emergency department by ambulance for left sided chest and abdominal pain after a fall at home overnight.  Patient describes long-standing history for alcohol dependence, 2 L every 2 to 3 days of whiskey or vodka.  Patient states he fell last night, sideways over the toilet which where he remained for some time before he crawled to his bedroom.  Patient states he thought he be okay but continued to have pain this morning and called 911.  Pain with inspiration, no shortness of breath.  Patient denies head injury or loss of consciousness but has facial bruising.  No extremity paresthesias or focal weakness.  No vomiting.    Patient found to be in atrial fibrillation with rapid ventricular rate per EMS.  Patient denies history of the same.  2 L normal saline given prior to arrival.  Aspirin given prior to arrival as well.    REVIEW OF SYSTEMS  See HPI for further details. All other systems are negative.     PAST MEDICAL HISTORY   has a past medical history of BPH (benign prostatic hyperplasia); Chronic low back pain; DDD (degenerative disc disease); Depression; Hypertension; Morbid obesity (HCC); and Nephrolithiasis.    SOCIAL HISTORY  Social History     Social History Main Topics   • Smoking status: Never Smoker   • Smokeless tobacco: Never Used   • Alcohol use 0.5 oz/week     1 Shots of liquor per week      Comment: day   • Drug use: No   • Sexual activity: Yes       SURGICAL HISTORY   has a past surgical history that includes gastric bypass laparoscopic (2006).    CURRENT MEDICATIONS  Home Medications     Reviewed by Rosa Warner (Pharmacy Tech) on 04/24/19 at 1414  Med List Status: Complete   Medication Last Dose Status   duloxetine (CYMBALTA) 30 MG CPEP 4/23/2019 Active   gabapentin (NEURONTIN) 300 MG Cap  "4/23/2019 Active   metformin (GLUCOPHAGE) 1000 MG tablet 4/23/2019 Active   QUEtiapine (SEROQUEL) 100 MG Tab 4/23/2019 Active                ALLERGIES  No Known Allergies    PHYSICAL EXAM  VITAL SIGNS: BP (!) 165/106   Pulse (!) 55   Temp 36.1 °C (97 °F) (Temporal)   Resp (!) 22   Ht 1.676 m (5' 6\")   Wt 119 kg (262 lb 5.6 oz)   SpO2 98%   BMI 42.34 kg/m²   Pulse ox interpretation: I interpret this pulse ox as normal.  Constitutional: Alert in no apparent distress.  Disheveled.  Obese.  HENT: Normocephalic, infraorbital ecchymosis and mild swelling without crepitus. Bilateral external ears normal, Nose normal.  Dry mucous membranes.  No oral trauma.  Eyes: Pupils are equal and reactive, Conjunctiva normal.   Neck: No midline tenderness palpation, no step-offs.  Supple.  Full range of motion without pain or resistance.  Lymphatic: No lymphadenopathy noted.   Cardiovascular: Tachycardic, irregularly irregular rate and rhythm, no murmurs. Distal pulses intact.  No peripheral edema.  Thorax & Lungs: Diminished bilaterally, poor and story effort secondary to discomfort, otherwise normal breath sounds.  No wheezes, rales or rhonchi.  No increased work of breathing, clip speech or retractions.  Discomfort with palpation left lateral and posterior lateral chest wall from approximately rib 4 through 10.  No significant hematoma, mild ecchymosis, no crepitus or full segment.  Abdomen: Obese, soft, non-distended.  Tender to palpation only left upper quadrant and left flank. No palpable or pulsatile masses. No peritoneal signs.   Skin: Warm, Dry  Musculoskeletal: Good range of motion in all major joints. No major deformities noted.   Neurologic: Alert and oriented x4.  Speech clear cohesive.  Moves all extremities spontaneously.  Psychiatric: Affect normal, Judgment normal, Mood normal.       DIAGNOSTIC STUDIES / PROCEDURES  LABS  Results for orders placed or performed during the hospital encounter of 04/24/19   CBC WITH " DIFFERENTIAL   Result Value Ref Range    WBC 6.8 4.8 - 10.8 K/uL    RBC 4.65 (L) 4.70 - 6.10 M/uL    Hemoglobin 15.3 14.0 - 18.0 g/dL    Hematocrit 45.3 42.0 - 52.0 %    MCV 97.4 81.4 - 97.8 fL    MCH 32.9 27.0 - 33.0 pg    MCHC 33.8 33.7 - 35.3 g/dL    RDW 52.1 (H) 35.9 - 50.0 fL    Platelet Count 214 164 - 446 K/uL    MPV 9.6 9.0 - 12.9 fL    Neutrophils-Polys 68.10 44.00 - 72.00 %    Lymphocytes 20.30 (L) 22.00 - 41.00 %    Monocytes 9.90 0.00 - 13.40 %    Eosinophils 0.10 0.00 - 6.90 %    Basophils 0.90 0.00 - 1.80 %    Immature Granulocytes 0.70 0.00 - 0.90 %    Nucleated RBC 0.00 /100 WBC    Neutrophils (Absolute) 4.59 1.82 - 7.42 K/uL    Lymphs (Absolute) 1.37 1.00 - 4.80 K/uL    Monos (Absolute) 0.67 0.00 - 0.85 K/uL    Eos (Absolute) 0.01 0.00 - 0.51 K/uL    Baso (Absolute) 0.06 0.00 - 0.12 K/uL    Immature Granulocytes (abs) 0.05 0.00 - 0.11 K/uL    NRBC (Absolute) 0.00 K/uL   COMP METABOLIC PANEL   Result Value Ref Range    Sodium 138 135 - 145 mmol/L    Potassium 3.2 (L) 3.6 - 5.5 mmol/L    Chloride 101 96 - 112 mmol/L    Co2 18 (L) 20 - 33 mmol/L    Anion Gap 19.0 (H) 0.0 - 11.9    Glucose 192 (H) 65 - 99 mg/dL    Bun 3 (L) 8 - 22 mg/dL    Creatinine 0.64 0.50 - 1.40 mg/dL    Calcium 7.7 (L) 8.5 - 10.5 mg/dL    AST(SGOT) 111 (H) 12 - 45 U/L    ALT(SGPT) 52 (H) 2 - 50 U/L    Alkaline Phosphatase 91 30 - 99 U/L    Total Bilirubin 1.2 0.1 - 1.5 mg/dL    Albumin 3.8 3.2 - 4.9 g/dL    Total Protein 7.2 6.0 - 8.2 g/dL    Globulin 3.4 1.9 - 3.5 g/dL    A-G Ratio 1.1 g/dL   LIPASE   Result Value Ref Range    Lipase 43 11 - 82 U/L   APTT   Result Value Ref Range    APTT 29.9 24.7 - 36.0 sec   PROTHROMBIN TIME   Result Value Ref Range    PT 15.1 (H) 12.0 - 14.6 sec    INR 1.18 (H) 0.87 - 1.13   TROPONIN   Result Value Ref Range    Troponin I 0.11 (H) 0.00 - 0.04 ng/mL   DIAGNOSTIC ALCOHOL   Result Value Ref Range    Diagnostic Alcohol 0.15 (H) 0.00 g/dL   CREATINE KINASE   Result Value Ref Range    CPK Total 54 0  - 154 U/L   ESTIMATED GFR   Result Value Ref Range    GFR If African American >60 >60 mL/min/1.73 m 2    GFR If Non African American >60 >60 mL/min/1.73 m 2   Blood Culture,Hold   Result Value Ref Range    Blood Culture Hold Collected    Blood Culture,Hold   Result Value Ref Range    Blood Culture Hold Collected    EKG (NOW)   Result Value Ref Range    Report       [facility]    Test Date:  2019  Pt Name:    LISA MCGEE              Department: ER  MRN:        [id number]                      Room:       RD 11  Gender:     Male                         Technician: [id number]  :        1955                   Requested By:ER TRIAGE PROTOCOL  Order #:    [id number]                    Reading MD:    Measurements  Intervals                                Axis  Rate:       129                          P:  RI:                                      QRS:        -63  QRSD:       106                          T:          115  QT:         328  QTc:        481    Interpretive Statements  ATRIAL FLUTTER WITH 2:1 AV BLOCK  LEFT ANTERIOR FASCICULAR BLOCK  PROBABLE LVH WITH SECONDARY REPOL ABNRM  BORDERLINE PROLONGED QT INTERVAL  No previous ECG available for comparison       RADIOLOGY  CT-CHEST,ABDOMEN,PELVIS WITH   Final Result   Addendum 1 of 1   There are multiple old left rib fractures.   There are acute fractures of the left fifth, 6, seventh, ninth, and    possibly 10th ribs.   No pulmonary contusion identified.      Final      No acute injury identified.   4 mm right lower lobe nodule containing punctate calcification most likely granuloma.   Hepatic steatosis.   Surgical absence gallbladder.   Lobulated kidneys no hydronephrosis.   No evidence of bowel obstruction. No free fluid.      CT-CSPINE WITHOUT PLUS RECONS   Final Result      No acute fracture. Degenerative changes.      CT-HEAD W/O   Final Result      1.  Cerebral atrophy.      2.  White matter lucencies most consistent  with small vessel ischemic change versus demyelination or gliosis.      3.  Otherwise, Head CT without contrast with no acute findings. No evidence of acute cerebral hemorrhage or mass lesion.          COURSE & MEDICAL DECISION MAKING  Nursing notes and vital signs were reviewed. (See chart for details)  The patients  records were reviewed, history was obtained from the patient;     ED evaluation after a fall demonstrates multiple left-sided rib fractures, no evidence for flail chest, pneumothorax, hemothorax or pulmonary contusions however.  CT head and C-spine unremarkable.  Facial contusion on exam.  Abdominal exam is benign.  Patient's pain is poorly controlled with fentanyl and morphine.  Additionally, patient with long-standing history of alcohol abuse, but denies known history for alcohol withdrawal, however also with reportedly new onset atrial fibrillation with rapid ventricular rate.  Patient was hydrated with 2 L normal saline prior to arrival, 1 L detox bag, pain control with fentanyl, anxiolysis with Ativan without improvement in the heart rate.  Therefore given 5 mg of Lopressor intravenously with heart rate improvement from 120s-150s to 100.  Metoprolol 25 mg given orally now as well.  Blood pressure stable throughout.  Aspirin given in route.  Labs are otherwise quite unrevealing, mild hypokalemia, potassium 3.2, 40 mEq of potassium chloride has been ordered.  CO2 18, IV fluid given as above.  Normal renal function.  Normal CPK despite described downtime.  Troponin is elevated, this may be secondary to the new A. fib RVR, no chest pain, no evidence for ischemia.  Will trend.  Patient will be admitted to the hospital for further evaluation and treatment as well as monitoring for alcohol withdrawal.    2:02 PM Dr. Reyna is aware of the patient and agreeable consultation.    2:25 PM Dr. Kelly is aware the patient agreeable to admission.    The total critical care time on this patient is 40 minutes,  continuous hemodynamic monitoring of multiple bedside evaluations, resuscitating patient, titrating medications and assess response to treatment, speaking with admitting and consulting physician, deciphering test results, and arranging for hospital admission. This 40 minutes is exclusive of separately billable procedures.    FINAL IMPRESSION  (I48.91) New onset atrial fibrillation (HCC)  (I48.91) Atrial fibrillation with RVR (HCC)  (R74.8) Elevated troponin  (W19.XXXA) Fall, initial encounter  (F10.920) Alcoholic intoxication without complication (HCC)  (S22.42XA) Closed fracture of multiple ribs of left side, initial encounter  (S09.90XA) Closed head injury, initial encounter  (S00.83XA) Contusion of face, initial encounter      Electronically signed by: Selma Toussaint, 4/24/2019 2:18 PM      This dictation was created using voice recognition software. The accuracy of the dictation is limited to the abilities of the software. I expect there may be some errors of grammar and possibly content. The nursing notes were reviewed and certain aspects of this information were incorporated into this note.

## 2019-04-24 NOTE — ASSESSMENT & PLAN NOTE
Uncontrolled left rib pain .  Patient followed by trauma surgery - plan supportive treatment  MS Contin for pain control.  PRN oxycodone and IV morphine  encourage I-S  Mobilize with walker  O2 support as needed  PT/OT  Add Lidoderm patch and try to decrease reliance on opiates

## 2019-04-24 NOTE — H&P
Hospital Medicine History & Physical Note    Date of Service  4/24/2019    Primary Care Physician  Jennifer Hart M.D.    Consultants  Dr. Reyna    Code Status  Full    Chief Complaint  Alcohol intoxication with fall    History of Presenting Illness  64 y.o. male who presented 4/24/2019 with acute alcohol intoxication who fell off while sitting on the toilet.  He says he drinks heavily daily, was not able to give me a specific amount.  He was sitting on the toilet when he was struggling to get up and fell over sideways and struggled to get up.  He crawled to the bedroom and called for 911.  He says prior to his fall he was not feeling chest pain or shortness of breath.  Possibly was feeling dizzy but was not sure due to his intoxication.  He complains of left-sided chest pain, worse with deep breath and cough.  He has a history of diabetes and hypertension.  No prior history of atrial fibrillation.  The ER he was found in A. fib RVR rate 150s-160s.  He was given Ativan, IV fluids troll without improvement of his heart rate.  He was given IV metoprolol with his troponin was.  His alcohol level was positive.  He had alcohol hepatitis.  Anion gap 19 with bicarb 18. Imaging was positive for left-sided rib fractures.    Review of Systems  Review of Systems   Constitutional: Positive for malaise/fatigue.   HENT: Negative for congestion.    Eyes: Negative for blurred vision and pain.   Respiratory: Positive for cough (mild). Negative for shortness of breath.    Cardiovascular: Positive for chest pain. Negative for palpitations.   Gastrointestinal: Negative for abdominal pain, nausea and vomiting.   Musculoskeletal: Positive for myalgias.   Neurological: Positive for dizziness, tremors and weakness.       Past Medical History   has a past medical history of BPH (benign prostatic hyperplasia); Chronic low back pain; DDD (degenerative disc disease); Depression; Hypertension; Morbid obesity (HCC); and  Nephrolithiasis.    Surgical History   has a past surgical history that includes gastric bypass laparoscopic (2006).     Family History  family history includes Alcohol/Drug in his father; Heart Disease in his father; Lung Disease in his father and mother.     Social History   reports that he has never smoked. He has never used smokeless tobacco. He reports that he drinks about 0.5 oz of alcohol per week . He reports that he does not use drugs.    Allergies  No Known Allergies    Medications  Prior to Admission Medications   Prescriptions Last Dose Informant Patient Reported? Taking?   QUEtiapine (SEROQUEL) 100 MG Tab 4/23/2019 at 2100 Patient Yes Yes   Sig: Take 100 mg by mouth every bedtime.   duloxetine (CYMBALTA) 30 MG CPEP 4/23/2019 at 0900 Patient No No   Sig: Take 2 Caps by mouth every day.   gabapentin (NEURONTIN) 300 MG Cap 4/23/2019 at 2100 Patient Yes No   Sig: Take 900 mg by mouth 3 times a day.   metformin (GLUCOPHAGE) 1000 MG tablet 4/23/2019 at 1700 Patient Yes Yes   Sig: Take 1,000 mg by mouth 2 times a day, with meals.      Facility-Administered Medications: None       Physical Exam  Temp:  [36.1 °C (97 °F)] 36.1 °C (97 °F)  Pulse:  [] 82  Resp:  [16-38] 20  BP: (143-165)/() 157/98  SpO2:  [90 %-98 %] 98 %    Physical Exam   Constitutional: He is oriented to person, place, and time. He appears well-developed.   Obesity, appears uncomfortable   HENT:   Head: Normocephalic.   Dry mucous membranes  Left eye  No facial crepitus   Eyes: Pupils are equal, round, and reactive to light. Conjunctivae and EOM are normal.   Neck: Neck supple.   Cardiovascular:   Irregular, tachycardic   Pulmonary/Chest: He has no wheezes. He has no rales. He exhibits tenderness.   Tachypnea   Abdominal: Soft. There is no tenderness. There is no rebound and no guarding.   Musculoskeletal: He exhibits edema (Trace).   Neurological: He is alert and oriented to person, place, and time.   Tremulous  5 out of 5   strength  5 strength lower extremities   Skin: Skin is warm.   Diaphoretic   Nursing note and vitals reviewed.      Laboratory:  Recent Labs      04/24/19   1042   WBC  6.8   RBC  4.65*   HEMOGLOBIN  15.3   HEMATOCRIT  45.3   MCV  97.4   MCH  32.9   MCHC  33.8   RDW  52.1*   PLATELETCT  214   MPV  9.6     Recent Labs      04/24/19   1042   SODIUM  138   POTASSIUM  3.2*   CHLORIDE  101   CO2  18*   GLUCOSE  192*   BUN  3*   CREATININE  0.64   CALCIUM  7.7*     Recent Labs      04/24/19   1042   ALTSGPT  52*   ASTSGOT  111*   ALKPHOSPHAT  91   TBILIRUBIN  1.2   LIPASE  43   GLUCOSE  192*     Recent Labs      04/24/19   1042   APTT  29.9   INR  1.18*             Recent Labs      04/24/19   1042  04/24/19   1711   TROPONINI  0.11*  0.16*       Urinalysis:    No results found     Imaging:  CT-CHEST,ABDOMEN,PELVIS WITH   Final Result   Addendum 1 of 1   There are multiple old left rib fractures.   There are acute fractures of the left fifth, 6, seventh, ninth, and    possibly 10th ribs.   No pulmonary contusion identified.      Final      No acute injury identified.   4 mm right lower lobe nodule containing punctate calcification most likely granuloma.   Hepatic steatosis.   Surgical absence gallbladder.   Lobulated kidneys no hydronephrosis.   No evidence of bowel obstruction. No free fluid.      CT-CSPINE WITHOUT PLUS RECONS   Final Result      No acute fracture. Degenerative changes.      CT-HEAD W/O   Final Result      1.  Cerebral atrophy.      2.  White matter lucencies most consistent with small vessel ischemic change versus demyelination or gliosis.      3.  Otherwise, Head CT without contrast with no acute findings. No evidence of acute cerebral hemorrhage or mass lesion.      EC-ECHOCARDIOGRAM COMPLETE W/O CONT    (Results Pending)   US-RUQ    (Results Pending)         Assessment/Plan:  I anticipate this patient will require at least two midnights for appropriate medical management, necessitating inpatient  admission.    * AF (atrial fibrillation) ([facility])   Assessment & Plan    New onset of alcohol intoxication  Started on metoprolol for heart rate control  Will need discussion of anticoagulation. [id number] 2  Telemetry  TSH and TTE ordered     Alcoholic intoxication without complication ([facility])   Assessment & Plan    CIWA protocol  Thiamine, folate, multivitamin  , IV fluids     Troponin level elevated   Assessment & Plan    Troponin in indeterminate range  Likely from A. fib RVR  We will trend troponin  Telemetry  Aspirin given  TTE pending     Closed fracture of multiple ribs of left side with routine healing   Assessment & Plan    Trauma surgery was consulted  pain management     Increased anion gap metabolic acidosis   Assessment & Plan    check lactate  Glucose is 192, unlikely diabetic ketoacidosis.  Will check beta hydroxybutyrate  Check UA for ketones  Lactated Ringer's and monitor BMP       Transaminitis   Assessment & Plan    High AST/ALT ratio, mild  Likely from alcohol  Trend LFTs  Right upper quadrant ultrasound ordered     Lung nodule   Assessment & Plan    4 mm right lower lobe nodule containing punctate calcification most likely granuloma.     Hypokalemia   Assessment & Plan    Repleted  Recheck tomorrow     Essential hypertension   Assessment & Plan    Not on outpatient medication  Is hypertensive in setting of alcohol withdrawal and pain  Started on metoprolol  IV PRN's ordered     Type 2 diabetes mellitus with diabetic polyneuropathy, without long-term current use of insulin ([facility])   Assessment & Plan    Hold metformin  Insulin sliding scale  Continue home gabapentin  Diabetic diet     Obesity- (present on admission)   Assessment & Plan    Weight loss counseling         VTE prophylaxis: lovenox

## 2019-04-25 ENCOUNTER — APPOINTMENT (OUTPATIENT)
Dept: CARDIOLOGY | Facility: MEDICAL CENTER | Age: 64
DRG: 896 | End: 2019-04-25
Attending: INTERNAL MEDICINE
Payer: OTHER GOVERNMENT

## 2019-04-25 ENCOUNTER — APPOINTMENT (OUTPATIENT)
Dept: RADIOLOGY | Facility: MEDICAL CENTER | Age: 64
DRG: 896 | End: 2019-04-25
Attending: INTERNAL MEDICINE

## 2019-04-25 ENCOUNTER — APPOINTMENT (OUTPATIENT)
Dept: RADIOLOGY | Facility: MEDICAL CENTER | Age: 64
DRG: 896 | End: 2019-04-25
Attending: SURGERY

## 2019-04-25 PROBLEM — E83.42 HYPOMAGNESEMIA: Status: ACTIVE | Noted: 2019-04-25

## 2019-04-25 PROBLEM — H10.9 CONJUNCTIVITIS: Status: ACTIVE | Noted: 2019-04-25

## 2019-04-25 PROBLEM — E83.51 HYPOCALCEMIA: Status: ACTIVE | Noted: 2019-04-25

## 2019-04-25 LAB
ANION GAP SERPL CALC-SCNC: 5 MMOL/L (ref 0–11.9)
ANION GAP SERPL CALC-SCNC: 8 MMOL/L (ref 0–11.9)
BASOPHILS # BLD AUTO: 1 % (ref 0–1.8)
BASOPHILS # BLD: 0.05 K/UL (ref 0–0.12)
BUN SERPL-MCNC: 6 MG/DL (ref 8–22)
BUN SERPL-MCNC: 8 MG/DL (ref 8–22)
CALCIUM SERPL-MCNC: 6.8 MG/DL (ref 8.5–10.5)
CALCIUM SERPL-MCNC: 7.2 MG/DL (ref 8.5–10.5)
CHLORIDE SERPL-SCNC: 105 MMOL/L (ref 96–112)
CHLORIDE SERPL-SCNC: 106 MMOL/L (ref 96–112)
CO2 SERPL-SCNC: 25 MMOL/L (ref 20–33)
CO2 SERPL-SCNC: 26 MMOL/L (ref 20–33)
CREAT SERPL-MCNC: 0.61 MG/DL (ref 0.5–1.4)
CREAT SERPL-MCNC: 0.7 MG/DL (ref 0.5–1.4)
EOSINOPHIL # BLD AUTO: 0.12 K/UL (ref 0–0.51)
EOSINOPHIL NFR BLD: 2.5 % (ref 0–6.9)
ERYTHROCYTE [DISTWIDTH] IN BLOOD BY AUTOMATED COUNT: 54.9 FL (ref 35.9–50)
GLUCOSE BLD-MCNC: 137 MG/DL (ref 65–99)
GLUCOSE BLD-MCNC: 153 MG/DL (ref 65–99)
GLUCOSE BLD-MCNC: 166 MG/DL (ref 65–99)
GLUCOSE SERPL-MCNC: 146 MG/DL (ref 65–99)
GLUCOSE SERPL-MCNC: 174 MG/DL (ref 65–99)
HCT VFR BLD AUTO: 34.9 % (ref 42–52)
HGB BLD-MCNC: 11.5 G/DL (ref 14–18)
IMM GRANULOCYTES # BLD AUTO: 0.02 K/UL (ref 0–0.11)
IMM GRANULOCYTES NFR BLD AUTO: 0.4 % (ref 0–0.9)
LACTATE BLD-SCNC: 1.6 MMOL/L (ref 0.5–2)
LACTATE BLD-SCNC: 2.7 MMOL/L (ref 0.5–2)
LV EJECT FRACT  99904: 70
LV EJECT FRACT MOD 2C 99903: 64.77
LV EJECT FRACT MOD 4C 99902: 83.15
LV EJECT FRACT MOD BP 99901: 79.4
LYMPHOCYTES # BLD AUTO: 1.16 K/UL (ref 1–4.8)
LYMPHOCYTES NFR BLD: 23.8 % (ref 22–41)
MAGNESIUM SERPL-MCNC: 1.2 MG/DL (ref 1.5–2.5)
MAGNESIUM SERPL-MCNC: 1.4 MG/DL (ref 1.5–2.5)
MCH RBC QN AUTO: 33 PG (ref 27–33)
MCHC RBC AUTO-ENTMCNC: 33 G/DL (ref 33.7–35.3)
MCV RBC AUTO: 100 FL (ref 81.4–97.8)
MONOCYTES # BLD AUTO: 0.56 K/UL (ref 0–0.85)
MONOCYTES NFR BLD AUTO: 11.5 % (ref 0–13.4)
NEUTROPHILS # BLD AUTO: 2.97 K/UL (ref 1.82–7.42)
NEUTROPHILS NFR BLD: 60.8 % (ref 44–72)
NRBC # BLD AUTO: 0 K/UL
NRBC BLD-RTO: 0 /100 WBC
PHOSPHATE SERPL-MCNC: 3.6 MG/DL (ref 2.5–4.5)
PLATELET # BLD AUTO: 143 K/UL (ref 164–446)
PMV BLD AUTO: 9.6 FL (ref 9–12.9)
POTASSIUM SERPL-SCNC: 4.2 MMOL/L (ref 3.6–5.5)
POTASSIUM SERPL-SCNC: 4.2 MMOL/L (ref 3.6–5.5)
RBC # BLD AUTO: 3.48 M/UL (ref 4.7–6.1)
SODIUM SERPL-SCNC: 136 MMOL/L (ref 135–145)
SODIUM SERPL-SCNC: 139 MMOL/L (ref 135–145)
TROPONIN I SERPL-MCNC: 0.1 NG/ML (ref 0–0.04)
WBC # BLD AUTO: 4.9 K/UL (ref 4.8–10.8)

## 2019-04-25 PROCEDURE — 83735 ASSAY OF MAGNESIUM: CPT

## 2019-04-25 PROCEDURE — 700105 HCHG RX REV CODE 258: Performed by: HOSPITALIST

## 2019-04-25 PROCEDURE — 80048 BASIC METABOLIC PNL TOTAL CA: CPT

## 2019-04-25 PROCEDURE — 84100 ASSAY OF PHOSPHORUS: CPT

## 2019-04-25 PROCEDURE — 700101 HCHG RX REV CODE 250: Performed by: HOSPITALIST

## 2019-04-25 PROCEDURE — 700111 HCHG RX REV CODE 636 W/ 250 OVERRIDE (IP): Performed by: HOSPITALIST

## 2019-04-25 PROCEDURE — 83605 ASSAY OF LACTIC ACID: CPT | Mod: 91

## 2019-04-25 PROCEDURE — 93306 TTE W/DOPPLER COMPLETE: CPT | Mod: 26 | Performed by: INTERNAL MEDICINE

## 2019-04-25 PROCEDURE — 71045 X-RAY EXAM CHEST 1 VIEW: CPT

## 2019-04-25 PROCEDURE — 3E02340 INTRODUCTION OF INFLUENZA VACCINE INTO MUSCLE, PERCUTANEOUS APPROACH: ICD-10-PCS | Performed by: INTERNAL MEDICINE

## 2019-04-25 PROCEDURE — 82962 GLUCOSE BLOOD TEST: CPT | Mod: 91

## 2019-04-25 PROCEDURE — 90686 IIV4 VACC NO PRSV 0.5 ML IM: CPT | Performed by: INTERNAL MEDICINE

## 2019-04-25 PROCEDURE — 84484 ASSAY OF TROPONIN QUANT: CPT

## 2019-04-25 PROCEDURE — 99233 SBSQ HOSP IP/OBS HIGH 50: CPT | Performed by: HOSPITALIST

## 2019-04-25 PROCEDURE — A9270 NON-COVERED ITEM OR SERVICE: HCPCS | Performed by: SURGERY

## 2019-04-25 PROCEDURE — 770020 HCHG ROOM/CARE - TELE (206)

## 2019-04-25 PROCEDURE — 85025 COMPLETE CBC W/AUTO DIFF WBC: CPT

## 2019-04-25 PROCEDURE — 3E0234Z INTRODUCTION OF SERUM, TOXOID AND VACCINE INTO MUSCLE, PERCUTANEOUS APPROACH: ICD-10-PCS | Performed by: INTERNAL MEDICINE

## 2019-04-25 PROCEDURE — 700102 HCHG RX REV CODE 250 W/ 637 OVERRIDE(OP): Performed by: HOSPITALIST

## 2019-04-25 PROCEDURE — 700105 HCHG RX REV CODE 258: Performed by: INTERNAL MEDICINE

## 2019-04-25 PROCEDURE — 36415 COLL VENOUS BLD VENIPUNCTURE: CPT

## 2019-04-25 PROCEDURE — 700111 HCHG RX REV CODE 636 W/ 250 OVERRIDE (IP): Performed by: INTERNAL MEDICINE

## 2019-04-25 PROCEDURE — 700102 HCHG RX REV CODE 250 W/ 637 OVERRIDE(OP): Performed by: SURGERY

## 2019-04-25 PROCEDURE — 700102 HCHG RX REV CODE 250 W/ 637 OVERRIDE(OP): Performed by: INTERNAL MEDICINE

## 2019-04-25 PROCEDURE — 51798 US URINE CAPACITY MEASURE: CPT

## 2019-04-25 PROCEDURE — 93306 TTE W/DOPPLER COMPLETE: CPT

## 2019-04-25 PROCEDURE — A9270 NON-COVERED ITEM OR SERVICE: HCPCS | Performed by: INTERNAL MEDICINE

## 2019-04-25 PROCEDURE — 76705 ECHO EXAM OF ABDOMEN: CPT

## 2019-04-25 PROCEDURE — A9270 NON-COVERED ITEM OR SERVICE: HCPCS | Performed by: HOSPITALIST

## 2019-04-25 PROCEDURE — 90732 PPSV23 VACC 2 YRS+ SUBQ/IM: CPT | Performed by: INTERNAL MEDICINE

## 2019-04-25 PROCEDURE — 90471 IMMUNIZATION ADMIN: CPT

## 2019-04-25 RX ORDER — THIAMINE MONONITRATE (VIT B1) 100 MG
100 TABLET ORAL DAILY
Status: DISCONTINUED | OUTPATIENT
Start: 2019-04-26 | End: 2019-05-03 | Stop reason: HOSPADM

## 2019-04-25 RX ORDER — MAGNESIUM SULFATE HEPTAHYDRATE 40 MG/ML
4 INJECTION, SOLUTION INTRAVENOUS ONCE
Status: COMPLETED | OUTPATIENT
Start: 2019-04-25 | End: 2019-04-25

## 2019-04-25 RX ORDER — DEXTROSE MONOHYDRATE 100 MG/ML
125 INJECTION, SOLUTION INTRAVENOUS
Status: DISCONTINUED | OUTPATIENT
Start: 2019-04-25 | End: 2019-05-03 | Stop reason: HOSPADM

## 2019-04-25 RX ORDER — IBUPROFEN 200 MG
500 CAPSULE ORAL
Status: DISCONTINUED | OUTPATIENT
Start: 2019-04-25 | End: 2019-05-03 | Stop reason: HOSPADM

## 2019-04-25 RX ORDER — ASPIRIN 325 MG
325 TABLET ORAL DAILY
Status: DISCONTINUED | OUTPATIENT
Start: 2019-04-25 | End: 2019-05-03 | Stop reason: HOSPADM

## 2019-04-25 RX ORDER — FOLIC ACID 1 MG/1
1 TABLET ORAL DAILY
Status: DISCONTINUED | OUTPATIENT
Start: 2019-04-26 | End: 2019-05-03 | Stop reason: HOSPADM

## 2019-04-25 RX ORDER — MORPHINE SULFATE 15 MG/1
15 TABLET, FILM COATED, EXTENDED RELEASE ORAL EVERY 12 HOURS
Status: DISCONTINUED | OUTPATIENT
Start: 2019-04-25 | End: 2019-04-27

## 2019-04-25 RX ORDER — CALCIUM GLUCONATE 94 MG/ML
1 INJECTION, SOLUTION INTRAVENOUS ONCE
Status: DISCONTINUED | OUTPATIENT
Start: 2019-04-25 | End: 2019-04-25

## 2019-04-25 RX ORDER — MAGNESIUM SULFATE 1 G/100ML
1 INJECTION INTRAVENOUS
Status: DISCONTINUED | OUTPATIENT
Start: 2019-04-25 | End: 2019-04-25

## 2019-04-25 RX ORDER — MAGNESIUM SULFATE HEPTAHYDRATE 40 MG/ML
2 INJECTION, SOLUTION INTRAVENOUS ONCE
Status: COMPLETED | OUTPATIENT
Start: 2019-04-25 | End: 2019-04-25

## 2019-04-25 RX ORDER — POLYMYXIN B SULFATE AND TRIMETHOPRIM 1; 10000 MG/ML; [USP'U]/ML
1 SOLUTION OPHTHALMIC 4 TIMES DAILY
Status: COMPLETED | OUTPATIENT
Start: 2019-04-25 | End: 2019-05-02

## 2019-04-25 RX ADMIN — LORAZEPAM 1 MG: 1 TABLET ORAL at 20:32

## 2019-04-25 RX ADMIN — INSULIN HUMAN 1 UNITS: 100 INJECTION, SOLUTION PARENTERAL at 11:13

## 2019-04-25 RX ADMIN — THERA TABS 1 TABLET: TAB at 05:29

## 2019-04-25 RX ADMIN — GABAPENTIN 900 MG: 300 CAPSULE ORAL at 05:29

## 2019-04-25 RX ADMIN — PNEUMOCOCCAL VACCINE POLYVALENT 25 MCG
25; 25; 25; 25; 25; 25; 25; 25; 25; 25; 25; 25; 25; 25; 25; 25; 25; 25; 25; 25; 25; 25; 25 INJECTION, SOLUTION INTRAMUSCULAR; SUBCUTANEOUS at 05:25

## 2019-04-25 RX ADMIN — ASPIRIN 325 MG: 325 TABLET, FILM COATED ORAL at 17:28

## 2019-04-25 RX ADMIN — OXYCODONE HYDROCHLORIDE 10 MG: 5 TABLET ORAL at 15:27

## 2019-04-25 RX ADMIN — POLYMYXIN B SULFATE, TRIMETHOPRIM SULFATE 1 DROP: 10000; 1 SOLUTION/ DROPS OPHTHALMIC at 18:40

## 2019-04-25 RX ADMIN — GABAPENTIN 900 MG: 300 CAPSULE ORAL at 17:29

## 2019-04-25 RX ADMIN — SODIUM CHLORIDE, POTASSIUM CHLORIDE, SODIUM LACTATE AND CALCIUM CHLORIDE: 600; 310; 30; 20 INJECTION, SOLUTION INTRAVENOUS at 15:27

## 2019-04-25 RX ADMIN — MORPHINE SULFATE 15 MG: 15 TABLET, EXTENDED RELEASE ORAL at 17:29

## 2019-04-25 RX ADMIN — ENOXAPARIN SODIUM 40 MG: 100 INJECTION SUBCUTANEOUS at 05:27

## 2019-04-25 RX ADMIN — Medication 100 MG: at 05:30

## 2019-04-25 RX ADMIN — MORPHINE SULFATE 2 MG: 4 INJECTION INTRAVENOUS at 20:18

## 2019-04-25 RX ADMIN — CALCIUM 500 MG: 500 TABLET ORAL at 17:29

## 2019-04-25 RX ADMIN — MORPHINE SULFATE 15 MG: 15 TABLET, EXTENDED RELEASE ORAL at 10:45

## 2019-04-25 RX ADMIN — SENNOSIDES,DOCUSATE SODIUM 2 TABLET: 8.6; 5 TABLET, FILM COATED ORAL at 05:30

## 2019-04-25 RX ADMIN — INSULIN HUMAN 1 UNITS: 100 INJECTION, SOLUTION PARENTERAL at 20:33

## 2019-04-25 RX ADMIN — GABAPENTIN 900 MG: 300 CAPSULE ORAL at 10:45

## 2019-04-25 RX ADMIN — FOLIC ACID 1 MG: 1 TABLET ORAL at 05:30

## 2019-04-25 RX ADMIN — SENNOSIDES,DOCUSATE SODIUM 2 TABLET: 8.6; 5 TABLET, FILM COATED ORAL at 17:29

## 2019-04-25 RX ADMIN — QUETIAPINE FUMARATE 100 MG: 25 TABLET ORAL at 20:18

## 2019-04-25 RX ADMIN — INFLUENZA A VIRUS A/MICHIGAN/45/2015 X-275 (H1N1) ANTIGEN (FORMALDEHYDE INACTIVATED), INFLUENZA A VIRUS A/SINGAPORE/INFIMH-16-0019/2016 IVR-186 (H3N2) ANTIGEN (FORMALDEHYDE INACTIVATED), INFLUENZA B VIRUS B/PHUKET/3073/2013 ANTIGEN (FORMALDEHYDE INACTIVATED), AND INFLUENZA B VIRUS B/MARYLAND/15/2016 BX-69A ANTIGEN (FORMALDEHYDE INACTIVATED) 0.5 ML: 15; 15; 15; 15 INJECTION, SUSPENSION INTRAMUSCULAR at 05:23

## 2019-04-25 RX ADMIN — CALCIUM GLUCONATE 1 G: 98 INJECTION, SOLUTION INTRAVENOUS at 04:29

## 2019-04-25 RX ADMIN — DULOXETINE HYDROCHLORIDE 60 MG: 60 CAPSULE, DELAYED RELEASE ORAL at 06:00

## 2019-04-25 RX ADMIN — LORAZEPAM 0.5 MG: 1 TABLET ORAL at 15:27

## 2019-04-25 RX ADMIN — POLYMYXIN B SULFATE, TRIMETHOPRIM SULFATE 1 DROP: 10000; 1 SOLUTION/ DROPS OPHTHALMIC at 20:19

## 2019-04-25 RX ADMIN — MAGNESIUM SULFATE IN WATER 4 G: 40 INJECTION, SOLUTION INTRAVENOUS at 17:30

## 2019-04-25 RX ADMIN — METOPROLOL TARTRATE 50 MG: 50 TABLET ORAL at 05:30

## 2019-04-25 RX ADMIN — MAGNESIUM SULFATE IN WATER 2 G: 40 INJECTION, SOLUTION INTRAVENOUS at 04:28

## 2019-04-25 RX ADMIN — METOPROLOL TARTRATE 75 MG: 50 TABLET ORAL at 17:28

## 2019-04-25 RX ADMIN — SODIUM CHLORIDE, POTASSIUM CHLORIDE, SODIUM LACTATE AND CALCIUM CHLORIDE: 600; 310; 30; 20 INJECTION, SOLUTION INTRAVENOUS at 04:39

## 2019-04-25 ASSESSMENT — LIFESTYLE VARIABLES
AUDITORY DISTURBANCES: NOT PRESENT
EVER FELT BAD OR GUILTY ABOUT YOUR DRINKING: YES
ON A TYPICAL DAY WHEN YOU DRINK ALCOHOL HOW MANY DRINKS DO YOU HAVE: 5
SUBSTANCE_ABUSE: 1
AUDITORY DISTURBANCES: NOT PRESENT
AVERAGE NUMBER OF DAYS PER WEEK YOU HAVE A DRINK CONTAINING ALCOHOL: 7
AUDITORY DISTURBANCES: NOT PRESENT
ANXIETY: MILDLY ANXIOUS
ANXIETY: MODERATELY ANXIOUS OR GUARDED, SO ANXIETY IS INFERRED
HOW MANY TIMES IN THE PAST YEAR HAVE YOU HAD 5 OR MORE DRINKS IN A DAY: 365
CONSUMPTION TOTAL: POSITIVE
DOES PATIENT WANT TO TALK TO SOMEONE ABOUT QUITTING: NO
ORIENTATION AND CLOUDING OF SENSORIUM: ORIENTED AND CAN DO SERIAL ADDITIONS
TREMOR: NO TREMOR
AGITATION: NORMAL ACTIVITY
PAROXYSMAL SWEATS: *
ORIENTATION AND CLOUDING OF SENSORIUM: ORIENTED AND CAN DO SERIAL ADDITIONS
ALCOHOL_USE: YES
ANXIETY: NO ANXIETY (AT EASE)
ORIENTATION AND CLOUDING OF SENSORIUM: ORIENTED AND CAN DO SERIAL ADDITIONS
HEADACHE, FULLNESS IN HEAD: NOT PRESENT
TREMOR: *
PAROXYSMAL SWEATS: BARELY PERCEPTIBLE SWEATING. PALMS MOIST
VISUAL DISTURBANCES: NOT PRESENT
AGITATION: NORMAL ACTIVITY
TOTAL SCORE: 2
VISUAL DISTURBANCES: NOT PRESENT
ORIENTATION AND CLOUDING OF SENSORIUM: ORIENTED AND CAN DO SERIAL ADDITIONS
AGITATION: NORMAL ACTIVITY
HEADACHE, FULLNESS IN HEAD: NOT PRESENT
PAROXYSMAL SWEATS: BARELY PERCEPTIBLE SWEATING. PALMS MOIST
TREMOR: NO TREMOR
NAUSEA AND VOMITING: NO NAUSEA AND NO VOMITING
EVER HAD A DRINK FIRST THING IN THE MORNING TO STEADY YOUR NERVES TO GET RID OF A HANGOVER: YES
ANXIETY: NO ANXIETY (AT EASE)
HEADACHE, FULLNESS IN HEAD: NOT PRESENT
TOTAL SCORE: 7
TOTAL SCORE: 1
AGITATION: NORMAL ACTIVITY
NAUSEA AND VOMITING: NO NAUSEA AND NO VOMITING
ANXIETY: NO ANXIETY (AT EASE)
PAROXYSMAL SWEATS: *
AGITATION: NORMAL ACTIVITY
AUDITORY DISTURBANCES: NOT PRESENT
NAUSEA AND VOMITING: NO NAUSEA AND NO VOMITING
PAROXYSMAL SWEATS: NO SWEAT VISIBLE
TREMOR: NO TREMOR
TOTAL SCORE: 8
AGITATION: NORMAL ACTIVITY
HAVE PEOPLE ANNOYED YOU BY CRITICIZING YOUR DRINKING: YES
HEADACHE, FULLNESS IN HEAD: NOT PRESENT
AUDITORY DISTURBANCES: NOT PRESENT
TOTAL SCORE: 4
AUDITORY DISTURBANCES: NOT PRESENT
ANXIETY: MODERATELY ANXIOUS OR GUARDED, SO ANXIETY IS INFERRED
TOTAL SCORE: 4
VISUAL DISTURBANCES: NOT PRESENT
HEADACHE, FULLNESS IN HEAD: NOT PRESENT
ORIENTATION AND CLOUDING OF SENSORIUM: ORIENTED AND CAN DO SERIAL ADDITIONS
HAVE YOU EVER FELT YOU SHOULD CUT DOWN ON YOUR DRINKING: YES
TOTAL SCORE: 2
TREMOR: NO TREMOR
DOES PATIENT WANT TO STOP DRINKING: YES
TOTAL SCORE: 4
ORIENTATION AND CLOUDING OF SENSORIUM: ORIENTED AND CAN DO SERIAL ADDITIONS
PAROXYSMAL SWEATS: NO SWEAT VISIBLE
VISUAL DISTURBANCES: VERY MILD SENSITIVITY
TREMOR: *
VISUAL DISTURBANCES: NOT PRESENT
NAUSEA AND VOMITING: NO NAUSEA AND NO VOMITING
NAUSEA AND VOMITING: NO NAUSEA AND NO VOMITING
TOTAL SCORE: 1
HEADACHE, FULLNESS IN HEAD: NOT PRESENT
VISUAL DISTURBANCES: NOT PRESENT
NAUSEA AND VOMITING: MILD NAUSEA WITH NO VOMITING

## 2019-04-25 ASSESSMENT — ENCOUNTER SYMPTOMS
BACK PAIN: 0
FEVER: 0
EYE DISCHARGE: 1
DIAPHORESIS: 0
DIARRHEA: 0
EYE REDNESS: 0
SENSORY CHANGE: 0
FOCAL WEAKNESS: 0
COUGH: 0
STRIDOR: 0
CHILLS: 0
SPEECH CHANGE: 0
WEAKNESS: 1
VOMITING: 0
SHORTNESS OF BREATH: 0
TREMORS: 1
FLANK PAIN: 0
PALPITATIONS: 0
ABDOMINAL PAIN: 0
FALLS: 1
HALLUCINATIONS: 0
WHEEZING: 0
NECK PAIN: 0

## 2019-04-25 ASSESSMENT — COGNITIVE AND FUNCTIONAL STATUS - GENERAL
DAILY ACTIVITIY SCORE: 15
TURNING FROM BACK TO SIDE WHILE IN FLAT BAD: A LOT
STANDING UP FROM CHAIR USING ARMS: A LOT
MOVING TO AND FROM BED TO CHAIR: A LOT
DRESSING REGULAR UPPER BODY CLOTHING: A LOT
TOILETING: A LOT
MOBILITY SCORE: 11
SUGGESTED CMS G CODE MODIFIER DAILY ACTIVITY: CK
CLIMB 3 TO 5 STEPS WITH RAILING: TOTAL
HELP NEEDED FOR BATHING: A LOT
MOVING FROM LYING ON BACK TO SITTING ON SIDE OF FLAT BED: A LOT
PERSONAL GROOMING: A LITTLE
DRESSING REGULAR LOWER BODY CLOTHING: A LOT
WALKING IN HOSPITAL ROOM: A LOT
SUGGESTED CMS G CODE MODIFIER MOBILITY: CL

## 2019-04-25 NOTE — PROGRESS NOTES
· 2 RN skin check complete with ALEXANDRE [place].  · Devices in place O2 tubing.  · Skin assessed under devices pink, blanching.  · Confirmed pressure ulcers found on pannus red/moist/open on L, L eye bruising from previous fall, feet dry calloused.  · New potential pressure ulcers noted on NA. Wound consult placed and wound reported.  · The following interventions in place Waffle bed, condom cath, interdry, foam.

## 2019-04-25 NOTE — DIETARY
NUTRITION SERVICES: BMI - Pt with BMI >40 (=Body mass index is 42.84 kg/m².). Weight loss counseling not appropriate in acute care setting.     RECOMMEND - Referral to outpatient nutrition services for weight management after D/C.

## 2019-04-25 NOTE — ASSESSMENT & PLAN NOTE
With crust and purulent drainage suspect bacterial-improved  Continue with Polytrim eyedrops-complete 5-day course  Visine for irritation dryness

## 2019-04-25 NOTE — PROGRESS NOTES
[facility] Medicine Daily Progress Note    Date of Service  4/25/2019    Chief Complaint  64 y.o. male admitted 4/24/2019 with alcohol intoxication, fall with left chest pain    Hospital Course    61-year-old male admitted with acute alcohol intoxication post fall while sitting on toilet.  Complaints of left-sided chest pain.  Findings of A. fib RVR.  Reports routinely drinks Black Velvet 2 L bottle over couple days.  Diagnosed with left-sided rib fractures.    Interval Problem Update    A. fib RVR-heart rate 80- 120s.  Reports left-sided chest pain.  Drainage and crust over eyes.  States last drink 2 nights ago.  Has generalized weakness, will slow  responses.    Consultants/Specialty    Dr. Reyna , surgery    Code Status    Full code    Disposition    Control withdrawal, A. fib RVR, try to mobilize    Review of Systems  Review of Systems   Constitutional: Positive for malaise/fatigue. Negative for chills, diaphoresis and fever.   HENT: Negative for congestion.    Eyes: Positive for discharge (w crust ). Negative for redness.   Respiratory: Negative for cough, shortness of breath, wheezing and stridor.    Cardiovascular: Positive for chest pain (left rib pain ). Negative for palpitations and leg swelling.   Gastrointestinal: Negative for abdominal pain, diarrhea and vomiting.   Genitourinary: Negative for flank pain and hematuria.   Musculoskeletal: Positive for falls. Negative for back pain, joint pain and neck pain.   Neurological: Positive for tremors and weakness. Negative for sensory change, speech change and focal weakness.   Psychiatric/Behavioral: Positive for substance abuse. Negative for hallucinations.        Physical Exam  Temp:  [36 °C (96.8 °F)-36.4 °C (97.6 °F)] 36.4 °C (97.6 °F)  Pulse:  [] 82  Resp:  [16-30] 16  BP: (104-158)/(52-98) 158/91  SpO2:  [90 %-99 %] 99 %    Physical Exam   Constitutional: He is oriented to person, place, and time. No distress.   HENT:   Head: Normocephalic and  atraumatic.   Nose: Nose normal.   Eyes: EOM are normal. No scleral icterus.   Mild whitish crust right > left eye   Neck: Normal range of motion. No JVD present.   Cardiovascular:   No murmur heard.  Irregular, irregular tachycardic.   Pulmonary/Chest: No stridor. He has no wheezes. He has no rales. He exhibits tenderness (left chest ).   Abdominal: Soft. Bowel sounds are normal. He exhibits no distension. There is no tenderness.   Obese   Musculoskeletal: He exhibits no edema or tenderness.   Neurological: He is alert and oriented to person, place, and time. No cranial nerve deficit.   Skin: Skin is warm and dry. He is not diaphoretic. No pallor.   Psychiatric: He has a normal mood and affect. His behavior is normal.   Vitals reviewed.      Fluids    Intake/Output Summary (Last 24 hours) at 04/25/19 1530  Last data filed at 04/24/19 2039   Gross per 24 hour   Intake               60 ml   Output                0 ml   Net               60 ml       Laboratory  Recent Labs      04/24/19   1042  04/25/19   0228   WBC  6.8  4.9   RBC  4.65*  3.48*   HEMOGLOBIN  15.3  11.5*   HEMATOCRIT  45.3  34.9*   MCV  97.4  100.0*   MCH  32.9  33.0   MCHC  33.8  33.0*   RDW  52.1*  54.9*   PLATELETCT  214  143*   MPV  9.6  9.6     Recent Labs      04/24/19   1042  04/25/19   0228  04/25/19   1037   SODIUM  138  139  136   POTASSIUM  3.2*  4.2  4.2   CHLORIDE  101  106  105   CO2  18*  25  26   GLUCOSE  192*  146*  174*   BUN  3*  6*  8   CREATININE  0.64  0.61  0.70   CALCIUM  7.7*  6.8*  7.2*     Recent Labs      04/24/19   1042   APTT  29.9   INR  1.18*               Imaging  EC-ECHOCARDIOGRAM COMPLETE W/O CONT   Final Result   CONCLUSIONS  Normal left ventricular size and systolic function.  Mild concentric left ventricular hypertrophy.  The aortic valve is sclerotic with good leaflet mobility.  Estimated right ventricular systolic pressure  is 50 mmHg.  No prior study is available for comparison.   DX-CHEST-LIMITED (1 VIEW)    Final Result      1.  Cardiomegaly.      2.  Multiple left rib fractures.      US-RUQ   Final Result      1.  Prior cholecystectomy. No evidence of biliary ductal dilatation.      2.  The liver is echogenic consistent with fatty change versus hepatocellular dysfunction.      CT-CHEST,ABDOMEN,PELVIS WITH   Final Result   Addendum 1 of 1   There are multiple old left rib fractures.   There are acute fractures of the left fifth, 6, seventh, ninth, and    possibly 10th ribs.   No pulmonary contusion identified.      Final      No acute injury identified.   4 mm right lower lobe nodule containing punctate calcification most likely granuloma.   Hepatic steatosis.   Surgical absence gallbladder.   Lobulated kidneys no hydronephrosis.   No evidence of bowel obstruction. No free fluid.      CT-CSPINE WITHOUT PLUS RECONS   Final Result      No acute fracture. Degenerative changes.      CT-HEAD W/O   Final Result      1.  Cerebral atrophy.      2.  White matter lucencies most consistent with small vessel ischemic change versus demyelination or gliosis.      3.  Otherwise, Head CT without contrast with no acute findings. No evidence of acute cerebral hemorrhage or mass lesion.           Assessment/Plan  * AF (atrial fibrillation) (HCC)   Assessment & Plan    New diagnosis of alcohol intoxication.  Patient does not have sensation of palpitations , unknown duration.   Periods of RVR with hypertension.  Increase metoprolol.   I discussed anticoagulation benefits versus risk with patient.  Patient situation complicated by frequent falls, active alcoholism, poor compliance resulting in significantly higher bleed risk.  Patient preference for aspirin therapy for now.  Will refer to cardiology  Continue telemetry       Alcoholic intoxication without complication (HCC)   Assessment & Plan    CIWA protocol  Start scheduled Neurontin, valproic acid  Thiamine, folate, multivitamin  IV fluid support     Troponin level elevated    Assessment & Plan    Troponin indeterminate range-trending down.  Likely from A. fib RVR, demand.  Echo preserved LV function.  Telemetry  Follow clinically  Left chest pain is due to rib fractures.       Closed fracture of multiple ribs of left side with routine healing   Assessment & Plan    Discussed with Dr. Reyna, Surgery.  Plan supportive treatment  Pain control, encourage I-S  Mobilize with more stable  O2 support as needed         Increased anion gap metabolic acidosis   Assessment & Plan    Likely etoh, starvation ketosis.   Encourage intake.          Transaminitis   Assessment & Plan    High AST/ALT ratio, mild-likely alcoholic hepatitis.  Ultrasound findings of hepatocellular dysfunction  Monitor liver enzymes  Advised abstinence from alcohol     Conjunctivitis   Assessment & Plan    With crust and purulent drainage suspect bacterial  Start Polytrim eyedrops     Hypocalcemia   Assessment & Plan    IV Ca given , improved  Replete low Mag  Start Ca carb.  Monitor electrolytes.      Hypomagnesemia   Assessment & Plan    Replete with IV mag  Fu electrolytes.      Lung nodule   Assessment & Plan    4 mm right lower lobe nodule containing punctate calcification most likely granuloma.  Outpatient followup recommended.      Hypokalemia   Assessment & Plan    Corrected  Follow up electrolytes.      Essential hypertension   Assessment & Plan    Uncontrolled  Continue as needed Ativan for withdrawal symptoms  As needed clonidine for withdrawal benefits  Increase Lopressor       Type 2 diabetes mellitus with diabetic polyneuropathy, without long-term current use of insulin (HCC)   Assessment & Plan    With hyperglycemia  Insulin sliding scale as needed.  Continue home gabapentin  Diabetic diet  Resume metformin when clinically stable     Obesity- (present on admission)   Assessment & Plan    Weight loss counseling          VTE prophylaxis: SCDs    Discussed with multidisciplinary team plan of care.

## 2019-04-25 NOTE — PROGRESS NOTES
RN skin check complete with ALEXANDRE Pride.  · Devices in place NC.  · Skin assessed under devices yes .  · Confirmed pressure ulcers found on NA.  · New potential pressure ulcers noted on NA.   · The following interventions in place    BL ears are red and blanching. Right elbow has a non blanching scab/scar. Left elbow red and blanching. Sacrum/buttock is red and blanching. Right pannus moist. Left pannus has several moisture fissures, some are opened; photos taken, area cleaned with soap water and wound , inner dry placed around entire pannus.   Left groin is red with moisture fissure; photos taken, area cleaned.   Scattered bruising on BL LE. BL feet are dry calloused, and peeling.

## 2019-04-25 NOTE — CONSULTS
DATE OF SERVICE:  04/24/2019    TRAUMA SURGICAL CONSULTATION    REASON FOR CONSULTATION:  The patient is a 64-year-old gentleman who was   brought up, was evaluated in the emergency room after having multiple ground   level falls at overnight and then subsequently had a longstanding history of   alcohol dependence, drinks 2 liters every 2-3 days.  He fell last night after   coming off the toilet.  He was continued to have pain and called 911.  He was   brought in as a nontrauma.  He was found to be in rapid AFib as well as   currently intoxicated.  His workup found him to have rib fractures, but no   pneumo or hemothorax.  I have been asked to see him in regards to this.    PAST MEDICAL HISTORY:  Illnesses are benign prostatic hypertrophy, low back   pain, hypertension and morbid obesity as well as nephrolithiasis.    PAST SURGICAL HISTORY:  Gastric bypass.    MEDICATIONS:  Currently are Cymbalta, Neurontin, Glucophage, and Seroquel.    ALLERGIES:  None.    SOCIAL HISTORY:  He does not smoke.  He drinks daily.    REVIEW OF SYSTEMS:  Otherwise not obtained given his mental status.    PHYSICAL EXAMINATION:  VITAL SIGNS:  His blood pressure 165/100, heart rate in the 150s.  GENERAL:  He is alert, disheveled.  HEENT:  He is anicteric.  NECK:  He is nontender.  CHEST:  He has tenderness in his left chest anteriorly.  He has decreased   breath sounds.  He has no crepitance or flail.  ABDOMEN:  Soft.  EXTREMITIES:  With 2+ edema.  NEUROLOGIC:  Intact.    LABORATORY AND DIAGNOSTIC DATA:  White count 6000, hemoglobin 15.  His   electrolytes are normal.  His blood alcohol level was 0.15.  His lactate is   3.6 and EKG shows him to be in rapid AFib.  CT of chest, abdomen and pelvis   demonstrated him to have old rib fractures on the left and acute left rib   fracture, apparent acute rib fracture of the left 5th, 6th, 7th, 8th, 9th and   possible 10th ribs.  No contusion or hemothorax.  Head CT and neck CT were    negative.    IMPRESSION:  This is a 64-year-old male in rapid atrial fibrillation with   alcohol intoxication and rib fractures.    PLAN:  Will be for him to be admitted by the hospitalist service to the   telemetry unit to be treated for his AFib.  The trauma service will follow   along in regards to management of his rib fractures and recommend analgesia as   well as serial chest x-rays.       ____________________________________     ANGE RAMSEY MD    NICANOR / YUDY    DD:  04/24/2019 20:56:14  DT:  04/24/2019 21:27:21    D#:  [id number]  Job#:  [id number]

## 2019-04-25 NOTE — PROGRESS NOTES
Trauma / Surgical Daily Progress Note    Date of Service  2019    Chief Complaint  64 y.o. male admitted 2019 with Alcohol intoxication (HCC)    Interval Events  Admitted after fall with rib fractures. Pulm toilet. IS ordered. Inc pain management.    Review of Systems  Review of Systems   Cardiovascular: Positive for chest pain.        Vital Signs  Temp:  [36 °C (96.8 °F)-36.4 °C (97.6 °F)] 36.4 °C (97.6 °F)  Pulse:  [] 100  Resp:  [16-38] 16  BP: (104-165)/() 146/75  SpO2:  [90 %-99 %] 99 %    Physical Exam  Physical Exam   Constitutional: He appears well-developed.   HENT:   Head: Normocephalic.   Neck: Neck supple.   Pulmonary/Chest: Effort normal. He exhibits tenderness.   Musculoskeletal: Normal range of motion.   Neurological: He is alert.   Skin: Skin is warm.       Laboratory  Recent Results (from the past 24 hour(s))   EKG (NOW)    Collection Time: 19 10:22 AM   Result Value Ref Range    Report       [facility] Emergency Dept.    Test Date:  2019  Pt Name:    LISA MCGEE              Department: ER  MRN:        [id number]                      Room:        11  Gender:     Male                         Technician: [id number]  :        [id number]                   Requested By:ER TRIAGE PROTOCOL  Order #:    [id number]                    Reading MD: CAROLINA MOODY DO    Measurements  Intervals                                Axis  Rate:       129                          P:  MA:                                      QRS:        -63  QRSD:       106                          T:          115  QT:         328  QTc:        481    Interpretive Statements  ATRIAL FLUTTER WITH 2:1 AV BLOCK  LEFT ANTERIOR FASCICULAR BLOCK  PROBABLE LVH WITH SECONDARY REPOL ABNRM  BORDERLINE PROLONGED QT INTERVAL  No previous ECG available for comparison    Electronically Signed On 2019 14:29:52 PDT by CAROLINA MOODY DO     Blood Culture,Hold    Collection Time: 19  10:40 AM   Result Value Ref Range    Blood Culture Hold Collected    CBC WITH DIFFERENTIAL    Collection Time: 04/24/19 10:42 AM   Result Value Ref Range    WBC 6.8 4.8 - 10.8 K/uL    RBC 4.65 (L) 4.70 - 6.10 M/uL    Hemoglobin 15.3 14.0 - 18.0 g/dL    Hematocrit 45.3 42.0 - 52.0 %    MCV 97.4 81.4 - 97.8 fL    MCH 32.9 27.0 - 33.0 pg    MCHC 33.8 33.7 - 35.3 g/dL    RDW 52.1 (H) 35.9 - 50.0 fL    Platelet Count 214 164 - 446 K/uL    MPV 9.6 9.0 - 12.9 fL    Neutrophils-Polys 68.10 44.00 - 72.00 %    Lymphocytes 20.30 (L) 22.00 - 41.00 %    Monocytes 9.90 0.00 - 13.40 %    Eosinophils 0.10 0.00 - 6.90 %    Basophils 0.90 0.00 - 1.80 %    Immature Granulocytes 0.70 0.00 - 0.90 %    Nucleated RBC 0.00 /100 WBC    Neutrophils (Absolute) 4.59 1.82 - 7.42 K/uL    Lymphs (Absolute) 1.37 1.00 - 4.80 K/uL    Monos (Absolute) 0.67 0.00 - 0.85 K/uL    Eos (Absolute) 0.01 0.00 - 0.51 K/uL    Baso (Absolute) 0.06 0.00 - 0.12 K/uL    Immature Granulocytes (abs) 0.05 0.00 - 0.11 K/uL    NRBC (Absolute) 0.00 K/uL   COMP METABOLIC PANEL    Collection Time: 04/24/19 10:42 AM   Result Value Ref Range    Sodium 138 135 - 145 mmol/L    Potassium 3.2 (L) 3.6 - 5.5 mmol/L    Chloride 101 96 - 112 mmol/L    Co2 18 (L) 20 - 33 mmol/L    Anion Gap 19.0 (H) 0.0 - 11.9    Glucose 192 (H) 65 - 99 mg/dL    Bun 3 (L) 8 - 22 mg/dL    Creatinine 0.64 0.50 - 1.40 mg/dL    Calcium 7.7 (L) 8.5 - 10.5 mg/dL    AST(SGOT) 111 (H) 12 - 45 U/L    ALT(SGPT) 52 (H) 2 - 50 U/L    Alkaline Phosphatase 91 30 - 99 U/L    Total Bilirubin 1.2 0.1 - 1.5 mg/dL    Albumin 3.8 3.2 - 4.9 g/dL    Total Protein 7.2 6.0 - 8.2 g/dL    Globulin 3.4 1.9 - 3.5 g/dL    A-G Ratio 1.1 g/dL   LIPASE    Collection Time: 04/24/19 10:42 AM   Result Value Ref Range    Lipase 43 11 - 82 U/L   APTT    Collection Time: 04/24/19 10:42 AM   Result Value Ref Range    APTT 29.9 24.7 - 36.0 sec   PROTHROMBIN TIME    Collection Time: 04/24/19 10:42 AM   Result Value Ref Range    PT 15.1 (H)  12.0 - 14.6 sec    INR 1.18 (H) 0.87 - 1.13   TROPONIN    Collection Time: 04/24/19 10:42 AM   Result Value Ref Range    Troponin I 0.11 (H) 0.00 - 0.04 ng/mL   DIAGNOSTIC ALCOHOL    Collection Time: 04/24/19 10:42 AM   Result Value Ref Range    Diagnostic Alcohol 0.15 (H) 0.00 g/dL   CREATINE KINASE    Collection Time: 04/24/19 10:42 AM   Result Value Ref Range    CPK Total 54 0 - 154 U/L   ESTIMATED GFR    Collection Time: 04/24/19 10:42 AM   Result Value Ref Range    GFR If African American >60 >60 mL/min/1.73 m 2    GFR If Non African American >60 >60 mL/min/1.73 m 2   Blood Culture,Hold    Collection Time: 04/24/19 10:42 AM   Result Value Ref Range    Blood Culture Hold Collected    BETA-HYDROXYBUTYRIC ACID    Collection Time: 04/24/19 10:42 AM   Result Value Ref Range    beta-Hydroxybutyric Acid 0.38 (H) 0.02 - 0.27 mmol/L   TSH WITH REFLEX TO FT4    Collection Time: 04/24/19 10:42 AM   Result Value Ref Range    TSH 0.870 0.380 - 5.330 uIU/mL   TROPONIN    Collection Time: 04/24/19  5:11 PM   Result Value Ref Range    Troponin I 0.16 (H) 0.00 - 0.04 ng/mL   URINALYSIS    Collection Time: 04/24/19  5:11 PM   Result Value Ref Range    Color Yellow     Character Clear     Specific Gravity >=1.045 (A) <1.035    Ph 6.5 5.0 - 8.0    Glucose Negative Negative mg/dL    Ketones Negative Negative mg/dL    Protein Negative Negative mg/dL    Bilirubin Negative Negative    Urobilinogen, Urine 1.0 Negative    Nitrite Negative Negative    Leukocyte Esterase Negative Negative    Occult Blood Negative Negative    Micro Urine Req see below    LACTIC ACID    Collection Time: 04/24/19  5:11 PM   Result Value Ref Range    Lactic Acid 3.6 (H) 0.5 - 2.0 mmol/L   ACCU-CHEK GLUCOSE    Collection Time: 04/24/19  6:56 PM   Result Value Ref Range    Glucose - Accu-Ck 130 (H) 65 - 99 mg/dL   ACCU-CHEK GLUCOSE    Collection Time: 04/24/19  8:43 PM   Result Value Ref Range    Glucose - Accu-Ck 129 (H) 65 - 99 mg/dL   TROPONIN    Collection  Time: 04/25/19  2:26 AM   Result Value Ref Range    Troponin I 0.10 (H) 0.00 - 0.04 ng/mL   LACTIC ACID    Collection Time: 04/25/19  2:28 AM   Result Value Ref Range    Lactic Acid 2.7 (H) 0.5 - 2.0 mmol/L   Basic Metabolic Panel (BMP)    Collection Time: 04/25/19  2:28 AM   Result Value Ref Range    Sodium 139 135 - 145 mmol/L    Potassium 4.2 3.6 - 5.5 mmol/L    Chloride 106 96 - 112 mmol/L    Co2 25 20 - 33 mmol/L    Glucose 146 (H) 65 - 99 mg/dL    Bun 6 (L) 8 - 22 mg/dL    Creatinine 0.61 0.50 - 1.40 mg/dL    Calcium 6.8 (LL) 8.5 - 10.5 mg/dL    Anion Gap 8.0 0.0 - 11.9   CBC with Differential    Collection Time: 04/25/19  2:28 AM   Result Value Ref Range    WBC 4.9 4.8 - 10.8 K/uL    RBC 3.48 (L) 4.70 - 6.10 M/uL    Hemoglobin 11.5 (L) 14.0 - 18.0 g/dL    Hematocrit 34.9 (L) 42.0 - 52.0 %    .0 (H) 81.4 - 97.8 fL    MCH 33.0 27.0 - 33.0 pg    MCHC 33.0 (L) 33.7 - 35.3 g/dL    RDW 54.9 (H) 35.9 - 50.0 fL    Platelet Count 143 (L) 164 - 446 K/uL    MPV 9.6 9.0 - 12.9 fL    Neutrophils-Polys 60.80 44.00 - 72.00 %    Lymphocytes 23.80 22.00 - 41.00 %    Monocytes 11.50 0.00 - 13.40 %    Eosinophils 2.50 0.00 - 6.90 %    Basophils 1.00 0.00 - 1.80 %    Immature Granulocytes 0.40 0.00 - 0.90 %    Nucleated RBC 0.00 /100 WBC    Neutrophils (Absolute) 2.97 1.82 - 7.42 K/uL    Lymphs (Absolute) 1.16 1.00 - 4.80 K/uL    Monos (Absolute) 0.56 0.00 - 0.85 K/uL    Eos (Absolute) 0.12 0.00 - 0.51 K/uL    Baso (Absolute) 0.05 0.00 - 0.12 K/uL    Immature Granulocytes (abs) 0.02 0.00 - 0.11 K/uL    NRBC (Absolute) 0.00 K/uL   Magnesium    Collection Time: 04/25/19  2:28 AM   Result Value Ref Range    Magnesium 1.2 (L) 1.5 - 2.5 mg/dL   Phosphorus    Collection Time: 04/25/19  2:28 AM   Result Value Ref Range    Phosphorus 3.6 2.5 - 4.5 mg/dL   ESTIMATED GFR    Collection Time: 04/25/19  2:28 AM   Result Value Ref Range    GFR If African American >60 >60 mL/min/1.73 m 2    GFR If Non  >60 >60  mL/min/1.73 m 2   ACCU-CHEK GLUCOSE    Collection Time: 04/25/19  5:32 AM   Result Value Ref Range    Glucose - Accu-Ck 137 (H) 65 - 99 mg/dL   LACTIC ACID    Collection Time: 04/25/19  6:04 AM   Result Value Ref Range    Lactic Acid 1.6 0.5 - 2.0 mmol/L       Fluids    Intake/Output Summary (Last 24 hours) at 04/25/19 1002  Last data filed at 04/24/19 2039   Gross per 24 hour   Intake               60 ml   Output                0 ml   Net               60 ml       Core Measures & Quality Metrics  Labs reviewed, Medications reviewed and Radiology images reviewed  Escobar catheter: No Escobar      DVT Prophylaxis: Not indicated at this time, ambulatory  DVT prophylaxis - mechanical: SCDs  Ulcer prophylaxis: Yes    Assessed for rehab: Patient unable to tolerate rehabilitation therapeutic regimen    TERRELL Score  ETOH Screening    Assessment/Plan  No new Assessment & Plan notes have been filed under this hospital service since the last note was generated.  Service: Surgery General      Discussed patient condition with RN and Patient. Dr. Fisher  CRITICAL CARE TIME EXCLUDING PROCEDURES: 20    minutes

## 2019-04-25 NOTE — PROGRESS NOTES
Updated on-call hositalist, Dr. Bello on pt's critical lab:  Ca 6.8     Other low labs:  Hgb dropped from 15.3 to 11.5  BUN dropped from 3 to 6  Mag is 1.2    Pt has had several bolus in the ED no output recorded. Pt has not urinated since he has been on the floor. Asked for a bladder scan and parameters. Order given for bladder scan and parameters to straight cath is bladder scan is 500-700 ml and place west if scan shows >700 ml. Orders for Mag and Ca Gluconate given as well.

## 2019-04-25 NOTE — PROGRESS NOTES
Report received. Pt care assumed. Assessment performed. Pt AOx4. Pt laying supine in bed. Pt c/o L rib pain and no signs of distress. Medicating per MAR. Bed in low, locked position. Bed alarm on. Call light within reach. Treaded socks on pt.  Hourly rounding in place.

## 2019-04-26 LAB
ALBUMIN SERPL BCP-MCNC: 3.1 G/DL (ref 3.2–4.9)
ALBUMIN SERPL BCP-MCNC: 3.1 G/DL (ref 3.2–4.9)
ALBUMIN/GLOB SERPL: 1.1 G/DL
ALBUMIN/GLOB SERPL: 1.3 G/DL
ALP SERPL-CCNC: 101 U/L (ref 30–99)
ALP SERPL-CCNC: 90 U/L (ref 30–99)
ALT SERPL-CCNC: 51 U/L (ref 2–50)
ALT SERPL-CCNC: 52 U/L (ref 2–50)
ANION GAP SERPL CALC-SCNC: 9 MMOL/L (ref 0–11.9)
ANION GAP SERPL CALC-SCNC: 9 MMOL/L (ref 0–11.9)
AST SERPL-CCNC: 139 U/L (ref 12–45)
AST SERPL-CCNC: 154 U/L (ref 12–45)
BASOPHILS # BLD AUTO: 0.5 % (ref 0–1.8)
BASOPHILS # BLD: 0.05 K/UL (ref 0–0.12)
BILIRUB SERPL-MCNC: 3.4 MG/DL (ref 0.1–1.5)
BILIRUB SERPL-MCNC: 4.2 MG/DL (ref 0.1–1.5)
BUN SERPL-MCNC: 8 MG/DL (ref 8–22)
BUN SERPL-MCNC: 8 MG/DL (ref 8–22)
CALCIUM SERPL-MCNC: 7.3 MG/DL (ref 8.5–10.5)
CALCIUM SERPL-MCNC: 7.4 MG/DL (ref 8.5–10.5)
CHLORIDE SERPL-SCNC: 100 MMOL/L (ref 96–112)
CHLORIDE SERPL-SCNC: 98 MMOL/L (ref 96–112)
CO2 SERPL-SCNC: 23 MMOL/L (ref 20–33)
CO2 SERPL-SCNC: 25 MMOL/L (ref 20–33)
CREAT SERPL-MCNC: 0.67 MG/DL (ref 0.5–1.4)
CREAT SERPL-MCNC: 0.69 MG/DL (ref 0.5–1.4)
EOSINOPHIL # BLD AUTO: 0.26 K/UL (ref 0–0.51)
EOSINOPHIL NFR BLD: 2.8 % (ref 0–6.9)
ERYTHROCYTE [DISTWIDTH] IN BLOOD BY AUTOMATED COUNT: 53.1 FL (ref 35.9–50)
GLOBULIN SER CALC-MCNC: 2.3 G/DL (ref 1.9–3.5)
GLOBULIN SER CALC-MCNC: 2.8 G/DL (ref 1.9–3.5)
GLUCOSE BLD-MCNC: 123 MG/DL (ref 65–99)
GLUCOSE BLD-MCNC: 125 MG/DL (ref 65–99)
GLUCOSE BLD-MCNC: 126 MG/DL (ref 65–99)
GLUCOSE BLD-MCNC: 139 MG/DL (ref 65–99)
GLUCOSE BLD-MCNC: 173 MG/DL (ref 65–99)
GLUCOSE SERPL-MCNC: 143 MG/DL (ref 65–99)
GLUCOSE SERPL-MCNC: 143 MG/DL (ref 65–99)
HCT VFR BLD AUTO: 37.7 % (ref 42–52)
HGB BLD-MCNC: 12.2 G/DL (ref 14–18)
IMM GRANULOCYTES # BLD AUTO: 0.09 K/UL (ref 0–0.11)
IMM GRANULOCYTES NFR BLD AUTO: 1 % (ref 0–0.9)
LYMPHOCYTES # BLD AUTO: 1.71 K/UL (ref 1–4.8)
LYMPHOCYTES NFR BLD: 18.2 % (ref 22–41)
MCH RBC QN AUTO: 32.7 PG (ref 27–33)
MCHC RBC AUTO-ENTMCNC: 32.4 G/DL (ref 33.7–35.3)
MCV RBC AUTO: 101.1 FL (ref 81.4–97.8)
MONOCYTES # BLD AUTO: 0.68 K/UL (ref 0–0.85)
MONOCYTES NFR BLD AUTO: 7.2 % (ref 0–13.4)
NEUTROPHILS # BLD AUTO: 6.62 K/UL (ref 1.82–7.42)
NEUTROPHILS NFR BLD: 70.3 % (ref 44–72)
NRBC # BLD AUTO: 0 K/UL
NRBC BLD-RTO: 0 /100 WBC
PLATELET # BLD AUTO: 156 K/UL (ref 164–446)
PMV BLD AUTO: 10.3 FL (ref 9–12.9)
POTASSIUM SERPL-SCNC: 3.9 MMOL/L (ref 3.6–5.5)
POTASSIUM SERPL-SCNC: 4.2 MMOL/L (ref 3.6–5.5)
PROT SERPL-MCNC: 5.4 G/DL (ref 6–8.2)
PROT SERPL-MCNC: 5.9 G/DL (ref 6–8.2)
RBC # BLD AUTO: 3.73 M/UL (ref 4.7–6.1)
SODIUM SERPL-SCNC: 130 MMOL/L (ref 135–145)
SODIUM SERPL-SCNC: 134 MMOL/L (ref 135–145)
WBC # BLD AUTO: 9.4 K/UL (ref 4.8–10.8)

## 2019-04-26 PROCEDURE — 700102 HCHG RX REV CODE 250 W/ 637 OVERRIDE(OP): Performed by: HOSPITALIST

## 2019-04-26 PROCEDURE — 700111 HCHG RX REV CODE 636 W/ 250 OVERRIDE (IP): Performed by: INTERNAL MEDICINE

## 2019-04-26 PROCEDURE — 700105 HCHG RX REV CODE 258: Performed by: HOSPITALIST

## 2019-04-26 PROCEDURE — 99233 SBSQ HOSP IP/OBS HIGH 50: CPT | Performed by: HOSPITALIST

## 2019-04-26 PROCEDURE — 700102 HCHG RX REV CODE 250 W/ 637 OVERRIDE(OP): Performed by: INTERNAL MEDICINE

## 2019-04-26 PROCEDURE — A9270 NON-COVERED ITEM OR SERVICE: HCPCS | Performed by: SURGERY

## 2019-04-26 PROCEDURE — 700102 HCHG RX REV CODE 250 W/ 637 OVERRIDE(OP): Performed by: SURGERY

## 2019-04-26 PROCEDURE — A9270 NON-COVERED ITEM OR SERVICE: HCPCS | Performed by: HOSPITALIST

## 2019-04-26 PROCEDURE — 82962 GLUCOSE BLOOD TEST: CPT | Mod: 91

## 2019-04-26 PROCEDURE — 36415 COLL VENOUS BLD VENIPUNCTURE: CPT

## 2019-04-26 PROCEDURE — 85025 COMPLETE CBC W/AUTO DIFF WBC: CPT

## 2019-04-26 PROCEDURE — 80053 COMPREHEN METABOLIC PANEL: CPT

## 2019-04-26 PROCEDURE — 770020 HCHG ROOM/CARE - TELE (206)

## 2019-04-26 PROCEDURE — A9270 NON-COVERED ITEM OR SERVICE: HCPCS | Performed by: INTERNAL MEDICINE

## 2019-04-26 RX ORDER — METOPROLOL TARTRATE 50 MG/1
100 TABLET, FILM COATED ORAL TWICE DAILY
Status: DISCONTINUED | OUTPATIENT
Start: 2019-04-26 | End: 2019-05-03 | Stop reason: HOSPADM

## 2019-04-26 RX ADMIN — MORPHINE SULFATE 15 MG: 15 TABLET, EXTENDED RELEASE ORAL at 18:01

## 2019-04-26 RX ADMIN — FOLIC ACID 1 MG: 1 TABLET ORAL at 06:26

## 2019-04-26 RX ADMIN — SENNOSIDES,DOCUSATE SODIUM 2 TABLET: 8.6; 5 TABLET, FILM COATED ORAL at 18:01

## 2019-04-26 RX ADMIN — ASPIRIN 325 MG: 325 TABLET, FILM COATED ORAL at 06:26

## 2019-04-26 RX ADMIN — CALCIUM 500 MG: 500 TABLET ORAL at 06:27

## 2019-04-26 RX ADMIN — MORPHINE SULFATE 15 MG: 15 TABLET, EXTENDED RELEASE ORAL at 06:25

## 2019-04-26 RX ADMIN — POLYMYXIN B SULFATE, TRIMETHOPRIM SULFATE 1 DROP: 10000; 1 SOLUTION/ DROPS OPHTHALMIC at 20:47

## 2019-04-26 RX ADMIN — QUETIAPINE FUMARATE 100 MG: 25 TABLET ORAL at 20:47

## 2019-04-26 RX ADMIN — SODIUM CHLORIDE, POTASSIUM CHLORIDE, SODIUM LACTATE AND CALCIUM CHLORIDE: 600; 310; 30; 20 INJECTION, SOLUTION INTRAVENOUS at 11:36

## 2019-04-26 RX ADMIN — LORAZEPAM 0.5 MG: 1 TABLET ORAL at 18:15

## 2019-04-26 RX ADMIN — POLYMYXIN B SULFATE, TRIMETHOPRIM SULFATE 1 DROP: 10000; 1 SOLUTION/ DROPS OPHTHALMIC at 18:01

## 2019-04-26 RX ADMIN — Medication 100 MG: at 06:26

## 2019-04-26 RX ADMIN — INSULIN HUMAN 1 UNITS: 100 INJECTION, SOLUTION PARENTERAL at 18:05

## 2019-04-26 RX ADMIN — CALCIUM 500 MG: 500 TABLET ORAL at 18:01

## 2019-04-26 RX ADMIN — ENOXAPARIN SODIUM 40 MG: 100 INJECTION SUBCUTANEOUS at 06:26

## 2019-04-26 RX ADMIN — METOPROLOL TARTRATE 100 MG: 50 TABLET ORAL at 18:01

## 2019-04-26 RX ADMIN — OXYCODONE HYDROCHLORIDE 10 MG: 5 TABLET ORAL at 20:50

## 2019-04-26 RX ADMIN — LORAZEPAM 0.5 MG: 1 TABLET ORAL at 11:14

## 2019-04-26 RX ADMIN — OXYCODONE HYDROCHLORIDE 10 MG: 5 TABLET ORAL at 11:36

## 2019-04-26 RX ADMIN — THERA TABS 1 TABLET: TAB at 06:26

## 2019-04-26 RX ADMIN — OXYCODONE HYDROCHLORIDE 10 MG: 5 TABLET ORAL at 01:01

## 2019-04-26 RX ADMIN — METOPROLOL TARTRATE 75 MG: 50 TABLET ORAL at 06:25

## 2019-04-26 RX ADMIN — POLYMYXIN B SULFATE, TRIMETHOPRIM SULFATE 1 DROP: 10000; 1 SOLUTION/ DROPS OPHTHALMIC at 11:11

## 2019-04-26 RX ADMIN — GABAPENTIN 900 MG: 300 CAPSULE ORAL at 06:26

## 2019-04-26 RX ADMIN — GABAPENTIN 900 MG: 300 CAPSULE ORAL at 11:12

## 2019-04-26 RX ADMIN — POLYMYXIN B SULFATE, TRIMETHOPRIM SULFATE 1 DROP: 10000; 1 SOLUTION/ DROPS OPHTHALMIC at 15:27

## 2019-04-26 RX ADMIN — CALCIUM 500 MG: 500 TABLET ORAL at 11:11

## 2019-04-26 RX ADMIN — SENNOSIDES,DOCUSATE SODIUM 2 TABLET: 8.6; 5 TABLET, FILM COATED ORAL at 06:26

## 2019-04-26 RX ADMIN — SODIUM CHLORIDE, POTASSIUM CHLORIDE, SODIUM LACTATE AND CALCIUM CHLORIDE: 600; 310; 30; 20 INJECTION, SOLUTION INTRAVENOUS at 23:48

## 2019-04-26 RX ADMIN — DULOXETINE HYDROCHLORIDE 60 MG: 60 CAPSULE, DELAYED RELEASE ORAL at 06:26

## 2019-04-26 RX ADMIN — GABAPENTIN 900 MG: 300 CAPSULE ORAL at 18:01

## 2019-04-26 ASSESSMENT — LIFESTYLE VARIABLES
HEADACHE, FULLNESS IN HEAD: NOT PRESENT
AGITATION: SOMEWHAT MORE THAN NORMAL ACTIVITY
ORIENTATION AND CLOUDING OF SENSORIUM: DATE DISORIENTATION BY NO MORE THAN TWO CALENDAR DAYS
AUDITORY DISTURBANCES: NOT PRESENT
HEADACHE, FULLNESS IN HEAD: NOT PRESENT
TOTAL SCORE: 4
VISUAL DISTURBANCES: NOT PRESENT
PAROXYSMAL SWEATS: BARELY PERCEPTIBLE SWEATING. PALMS MOIST
NAUSEA AND VOMITING: NO NAUSEA AND NO VOMITING
AUDITORY DISTURBANCES: NOT PRESENT
ORIENTATION AND CLOUDING OF SENSORIUM: ORIENTED AND CAN DO SERIAL ADDITIONS
ORIENTATION AND CLOUDING OF SENSORIUM: ORIENTED AND CAN DO SERIAL ADDITIONS
AUDITORY DISTURBANCES: NOT PRESENT
ANXIETY: MILDLY ANXIOUS
NAUSEA AND VOMITING: NO NAUSEA AND NO VOMITING
HEADACHE, FULLNESS IN HEAD: NOT PRESENT
NAUSEA AND VOMITING: NO NAUSEA AND NO VOMITING
TOTAL SCORE: 7
ORIENTATION AND CLOUDING OF SENSORIUM: DATE DISORIENTATION BY NO MORE THAN TWO CALENDAR DAYS
AUDITORY DISTURBANCES: NOT PRESENT
ANXIETY: MILDLY ANXIOUS
AGITATION: NORMAL ACTIVITY
TOTAL SCORE: 3
ANXIETY: MILDLY ANXIOUS
VISUAL DISTURBANCES: NOT PRESENT
NAUSEA AND VOMITING: NO NAUSEA AND NO VOMITING
TREMOR: TREMOR NOT VISIBLE BUT CAN BE FELT, FINGERTIP TO FINGERTIP
AUDITORY DISTURBANCES: NOT PRESENT
TREMOR: TREMOR NOT VISIBLE BUT CAN BE FELT, FINGERTIP TO FINGERTIP
AGITATION: NORMAL ACTIVITY
TREMOR: TREMOR NOT VISIBLE BUT CAN BE FELT, FINGERTIP TO FINGERTIP
HEADACHE, FULLNESS IN HEAD: NOT PRESENT
AGITATION: NORMAL ACTIVITY
HEADACHE, FULLNESS IN HEAD: VERY MILD
ANXIETY: MILDLY ANXIOUS
PAROXYSMAL SWEATS: BARELY PERCEPTIBLE SWEATING. PALMS MOIST
HEADACHE, FULLNESS IN HEAD: NOT PRESENT
NAUSEA AND VOMITING: NO NAUSEA AND NO VOMITING
TREMOR: TREMOR NOT VISIBLE BUT CAN BE FELT, FINGERTIP TO FINGERTIP
VISUAL DISTURBANCES: NOT PRESENT
ORIENTATION AND CLOUDING OF SENSORIUM: DATE DISORIENTATION BY NO MORE THAN TWO CALENDAR DAYS
AUDITORY DISTURBANCES: NOT PRESENT
TOTAL SCORE: 3
ORIENTATION AND CLOUDING OF SENSORIUM: ORIENTED AND CAN DO SERIAL ADDITIONS
ORIENTATION AND CLOUDING OF SENSORIUM: ORIENTED AND CAN DO SERIAL ADDITIONS
TOTAL SCORE: 1
ANXIETY: MILDLY ANXIOUS
TREMOR: *
PAROXYSMAL SWEATS: BARELY PERCEPTIBLE SWEATING. PALMS MOIST
ANXIETY: NO ANXIETY (AT EASE)
VISUAL DISTURBANCES: NOT PRESENT
TOTAL SCORE: 5
AGITATION: NORMAL ACTIVITY
SUBSTANCE_ABUSE: 1
PAROXYSMAL SWEATS: NO SWEAT VISIBLE
PAROXYSMAL SWEATS: BARELY PERCEPTIBLE SWEATING. PALMS MOIST
VISUAL DISTURBANCES: NOT PRESENT
TOTAL SCORE: 4
PAROXYSMAL SWEATS: BARELY PERCEPTIBLE SWEATING. PALMS MOIST
ANXIETY: NO ANXIETY (AT EASE)
HEADACHE, FULLNESS IN HEAD: NOT PRESENT
VISUAL DISTURBANCES: NOT PRESENT
VISUAL DISTURBANCES: NOT PRESENT
NAUSEA AND VOMITING: NO NAUSEA AND NO VOMITING
NAUSEA AND VOMITING: NO NAUSEA AND NO VOMITING
AUDITORY DISTURBANCES: NOT PRESENT
AGITATION: NORMAL ACTIVITY
TREMOR: TREMOR NOT VISIBLE BUT CAN BE FELT, FINGERTIP TO FINGERTIP
TREMOR: TREMOR NOT VISIBLE BUT CAN BE FELT, FINGERTIP TO FINGERTIP
AGITATION: NORMAL ACTIVITY
PAROXYSMAL SWEATS: BARELY PERCEPTIBLE SWEATING. PALMS MOIST

## 2019-04-26 ASSESSMENT — ENCOUNTER SYMPTOMS
FOCAL WEAKNESS: 0
PALPITATIONS: 0
ABDOMINAL PAIN: 0
CHILLS: 0
FLANK PAIN: 0
WEAKNESS: 1
EYE DISCHARGE: 1
SHORTNESS OF BREATH: 0
NECK PAIN: 0
VOMITING: 0
TREMORS: 1
COUGH: 0
DIARRHEA: 0
WHEEZING: 0
HALLUCINATIONS: 0
FALLS: 1
BACK PAIN: 0
EYE REDNESS: 0
CONSTIPATION: 0
FEVER: 0
SPEECH CHANGE: 0
STRIDOR: 0
SENSORY CHANGE: 0

## 2019-04-26 ASSESSMENT — PATIENT HEALTH QUESTIONNAIRE - PHQ9
1. LITTLE INTEREST OR PLEASURE IN DOING THINGS: NOT AT ALL
2. FEELING DOWN, DEPRESSED, IRRITABLE, OR HOPELESS: NOT AT ALL
SUM OF ALL RESPONSES TO PHQ9 QUESTIONS 1 AND 2: 0

## 2019-04-26 NOTE — PROGRESS NOTES
Report received. Pt care assumed. Assessment performed. Pt AOx3 (disoriented to time). Pt laying supine in bed. Pt c/o L rib pain and no signs of distress. Medicating per MAR. Bed in low, locked position. Bed alarm on. Call light within reach. Treaded socks on pt.  Hourly rounding in place.
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
## 2019-04-26 NOTE — PROGRESS NOTES
Hospital Medicine Daily Progress Note    Date of Service  4/26/2019    Chief Complaint  64 y.o. male admitted 4/24/2019 with alcohol intoxication, fall with left chest pain    Hospital Course    61-year-old male admitted with acute alcohol intoxication post fall while sitting on toilet.  Complaints of left-sided chest pain.  Findings of A. fib RVR.  Reports routinely drinks Black Velvet 2 L bottle over couple days.  Diagnosed with left-sided rib fractures.    Interval Problem Update    Afib  RVR with exertion, minimal activities. Ciwa reported low.  Pain better controlled.     Consultants/Specialty    Dr. Reyna , surgery    Code Status    Full code    Disposition    Control withdrawal, A. fib RVR, try to mobilize    Review of Systems  Review of Systems   Constitutional: Positive for malaise/fatigue. Negative for chills and fever.   HENT: Negative for congestion.    Eyes: Positive for discharge (w crust - improved. ). Negative for redness.   Respiratory: Negative for cough, shortness of breath, wheezing and stridor.    Cardiovascular: Positive for chest pain (left rib pain ). Negative for palpitations and leg swelling.   Gastrointestinal: Negative for abdominal pain, constipation, diarrhea and vomiting.   Genitourinary: Negative for flank pain and hematuria.   Musculoskeletal: Positive for falls. Negative for back pain, joint pain and neck pain.   Neurological: Positive for tremors and weakness. Negative for sensory change, speech change and focal weakness.   Psychiatric/Behavioral: Positive for substance abuse. Negative for hallucinations.        Physical Exam  Temp:  [36.1 °C (97 °F)-37.4 °C (99.3 °F)] 36.1 °C (97 °F)  Pulse:  [] 117  Resp:  [16-20] 18  BP: (116-158)/(68-91) 118/78  SpO2:  [93 %-97 %] 93 %    Physical Exam   Constitutional: He is oriented to person, place, and time. No distress.   HENT:   Head: Normocephalic.   Right Ear: External ear normal.   Left Ear: External ear normal.   Nose: Nose  normal.   Left periorbital ecchymosis   Eyes: EOM are normal. No scleral icterus.   Mild whitish crust right > left eye-no active drainage   Neck: Normal range of motion. No JVD present.   Cardiovascular:   No murmur heard.  Irregular, irregular tachycardic.   Pulmonary/Chest: No stridor. No respiratory distress. He has no wheezes. He has no rales. He exhibits tenderness (left chest ).   Abdominal: Soft. Bowel sounds are normal. He exhibits no distension. There is no tenderness.   Obese   Musculoskeletal: He exhibits no edema or tenderness.   No gross focal weakness   Neurological: He is alert and oriented to person, place, and time. No cranial nerve deficit.   Skin: Skin is warm and dry. He is not diaphoretic. No pallor.   Psychiatric: He has a normal mood and affect. His behavior is normal.   Vitals reviewed.      Fluids    Intake/Output Summary (Last 24 hours) at 04/26/19 1423  Last data filed at 04/26/19 0440   Gross per 24 hour   Intake                0 ml   Output              700 ml   Net             -700 ml       Laboratory  Recent Labs      04/24/19   1042  04/25/19   0228  04/26/19   0102   WBC  6.8  4.9  9.4   RBC  4.65*  3.48*  3.73*   HEMOGLOBIN  15.3  11.5*  12.2*   HEMATOCRIT  45.3  34.9*  37.7*   MCV  97.4  100.0*  101.1*   MCH  32.9  33.0  32.7   MCHC  33.8  33.0*  32.4*   RDW  52.1*  54.9*  53.1*   PLATELETCT  214  143*  156*   MPV  9.6  9.6  10.3     Recent Labs      04/25/19   0228  04/25/19   1037  04/26/19   0102   SODIUM  139  136  134*   POTASSIUM  4.2  4.2  4.2   CHLORIDE  106  105  100   CO2  25 26 25   GLUCOSE  146*  174*  143*   BUN  6*  8  8   CREATININE  0.61  0.70  0.69   CALCIUM  6.8*  7.2*  7.3*     Recent Labs      04/24/19   1042   APTT  29.9   INR  1.18*               Imaging  EC-ECHOCARDIOGRAM COMPLETE W/O CONT   Final Result   CONCLUSIONS  Normal left ventricular size and systolic function.  Mild concentric left ventricular hypertrophy.  The aortic valve is sclerotic with good  leaflet mobility.  Estimated right ventricular systolic pressure  is 50 mmHg.  No prior study is available for comparison.   DX-CHEST-LIMITED (1 VIEW)   Final Result      1.  Cardiomegaly.      2.  Multiple left rib fractures.      US-RUQ   Final Result      1.  Prior cholecystectomy. No evidence of biliary ductal dilatation.      2.  The liver is echogenic consistent with fatty change versus hepatocellular dysfunction.      CT-CHEST,ABDOMEN,PELVIS WITH   Final Result   Addendum 1 of 1   There are multiple old left rib fractures.   There are acute fractures of the left fifth, 6, seventh, ninth, and    possibly 10th ribs.   No pulmonary contusion identified.      Final      No acute injury identified.   4 mm right lower lobe nodule containing punctate calcification most likely granuloma.   Hepatic steatosis.   Surgical absence gallbladder.   Lobulated kidneys no hydronephrosis.   No evidence of bowel obstruction. No free fluid.      CT-CSPINE WITHOUT PLUS RECONS   Final Result      No acute fracture. Degenerative changes.      CT-HEAD W/O   Final Result      1.  Cerebral atrophy.      2.  White matter lucencies most consistent with small vessel ischemic change versus demyelination or gliosis.      3.  Otherwise, Head CT without contrast with no acute findings. No evidence of acute cerebral hemorrhage or mass lesion.           Assessment/Plan  * AF (atrial fibrillation) (HCC)   Assessment & Plan    New diagnosis of alcohol intoxication.  Patient does not have sensation of palpitations , unknown duration.  Echo preserved LV function  A. fib RVR with exertion.  Increase Lopressor  The  benefits versus risk of anticoagulation were discussed with patient.  Patient situation complicated by frequent falls, active alcoholism, poor compliance resulting in significantly higher bleed risk.   Will refer to cardiology  Continue aspirin therapy  Monitor telemetry       Alcoholic intoxication without complication (HCC)   Assessment  & Plan    CIWA protocol  Continue with scheduled Neurontin.    Add valproic acid if increased signs of withdrawal  Thiamine, folate, multivitamin  IV fluid support     Troponin level elevated   Assessment & Plan    Troponin indeterminate range-trending down.  Likely from A. fib RVR, demand.  Echo preserved LV function.  Telemetry  Follow clinically  Left chest pain is due to rib fractures.       Closed fracture of multiple ribs of left side with routine healing   Assessment & Plan    Plan supportive treatment  Pain control with MS Contin, PRN oxycodone and IV morphine  encourage I-S  Mobilize /PT/OT when improve rate control  O2 support as needed  Trauma surgery input appreciated         Increased anion gap metabolic acidosis   Assessment & Plan    Likely etoh, starvation ketosis.   Encourage intake.          Transaminitis   Assessment & Plan    High AST/ALT ratio, mild-likely alcoholic hepatitis.  Ultrasound findings of hepatocellular dysfunction  Monitor liver enzymes  Advised abstinence from alcohol     Conjunctivitis   Assessment & Plan    With crust and purulent drainage suspect bacterial  Continue with Polytrim eyedrops     Hypocalcemia   Assessment & Plan    Improved  Monitor electrolytes, check a.m. magnesium  Ca carb.       Hypomagnesemia   Assessment & Plan    Repleted with IV mag  Follow-up magnesium     Lung nodule   Assessment & Plan    4 mm right lower lobe nodule containing punctate calcification most likely granuloma.  Outpatient followup recommended.      Hypokalemia   Assessment & Plan    Corrected  Follow up electrolytes.      Essential hypertension   Assessment & Plan    Increase beta-blocker  Continue as needed Ativan for withdrawal symptoms  As needed clonidine for withdrawal benefits  Increase Lopressor       Type 2 diabetes mellitus with diabetic polyneuropathy, without long-term current use of insulin (HCC)   Assessment & Plan    With hyperglycemia  Insulin sliding scale as needed.  Continue  home gabapentin  Diabetic diet  Start metformin     Obesity- (present on admission)   Assessment & Plan    Weight loss counseling          VTE prophylaxis: SCDs    Discussed with multidisciplinary team plan of care.

## 2019-04-26 NOTE — PROGRESS NOTES
· 2 RN skin check complete with ALEXANDRE Han.  · Devices in place O2 tubing.  · Skin assessed under devices pink, blanching.  · Confirmed pressure ulcers found on pannus red/moist/open on L, L eye bruising from previous fall, feet dry calloused, sacrum red, blanching, ears red/ slow blanching.  · New potential pressure ulcers noted on NA. Wound consult previously placed and wound reported.  · The following interventions in place Waffle bed, condom cath, interdry, foam, mepilex.

## 2019-04-26 NOTE — PROGRESS NOTES
· 2 RN skin check complete with ALEXANDRE Young.  · Devices in place: NC.  · Skin assessed under devices: red, blanching, intact but flaky.  · Confirmed pressure ulcers found on: NA  · New potential pressure ulcers noted on: NASkin issues are as follows:  · Face: red, blanching, flaky skin  · LEs: scattered scratches, dry and flaky   · Heels: intact, pink, blanching  · Elbows: pink and blanching  · Sacrum: red and blanching but intact  · Panus and groin: some redness and discoloration, excoriation, interdry replaced and barrier cream to groin  · The following interventions in place: waffle mattress in place, turning q2h although sometimes refuses. Heels elevated.

## 2019-04-26 NOTE — PROGRESS NOTES
Trauma / Surgical Daily Progress Note    Date of Service  4/26/2019    Chief Complaint  64 y.o. male admitted 4/24/2019 with Alcohol intoxication (HCC)    Interval Events  Stable on 3 l NC. Pain controlled. . Pulm toilet. Will sign off.    Review of Systems  Review of Systems   Cardiovascular: Positive for chest pain.        Vital Signs  Temp:  [36.1 °C (97 °F)-37.4 °C (99.3 °F)] 36.1 °C (97 °F)  Pulse:  [] 117  Resp:  [16-20] 18  BP: (116-158)/(68-91) 118/78  SpO2:  [93 %-97 %] 93 %    Physical Exam  Physical Exam   Constitutional: He appears well-developed.   HENT:   Head: Normocephalic.   Neck: Neck supple.   Pulmonary/Chest: Effort normal. He exhibits tenderness.   Musculoskeletal: Normal range of motion.   Neurological: He is alert.   Skin: Skin is warm.       Laboratory  Recent Results (from the past 24 hour(s))   ACCU-CHEK GLUCOSE    Collection Time: 04/25/19  5:28 PM   Result Value Ref Range    Glucose - Accu-Ck 123 (H) 65 - 99 mg/dL   ACCU-CHEK GLUCOSE    Collection Time: 04/25/19  8:30 PM   Result Value Ref Range    Glucose - Accu-Ck 153 (H) 65 - 99 mg/dL   CBC WITH DIFFERENTIAL    Collection Time: 04/26/19  1:02 AM   Result Value Ref Range    WBC 9.4 4.8 - 10.8 K/uL    RBC 3.73 (L) 4.70 - 6.10 M/uL    Hemoglobin 12.2 (L) 14.0 - 18.0 g/dL    Hematocrit 37.7 (L) 42.0 - 52.0 %    .1 (H) 81.4 - 97.8 fL    MCH 32.7 27.0 - 33.0 pg    MCHC 32.4 (L) 33.7 - 35.3 g/dL    RDW 53.1 (H) 35.9 - 50.0 fL    Platelet Count 156 (L) 164 - 446 K/uL    MPV 10.3 9.0 - 12.9 fL    Neutrophils-Polys 70.30 44.00 - 72.00 %    Lymphocytes 18.20 (L) 22.00 - 41.00 %    Monocytes 7.20 0.00 - 13.40 %    Eosinophils 2.80 0.00 - 6.90 %    Basophils 0.50 0.00 - 1.80 %    Immature Granulocytes 1.00 (H) 0.00 - 0.90 %    Nucleated RBC 0.00 /100 WBC    Neutrophils (Absolute) 6.62 1.82 - 7.42 K/uL    Lymphs (Absolute) 1.71 1.00 - 4.80 K/uL    Monos (Absolute) 0.68 0.00 - 0.85 K/uL    Eos (Absolute) 0.26 0.00 - 0.51 K/uL    Baso  (Absolute) 0.05 0.00 - 0.12 K/uL    Immature Granulocytes (abs) 0.09 0.00 - 0.11 K/uL    NRBC (Absolute) 0.00 K/uL   Comp Metabolic Panel    Collection Time: 04/26/19  1:02 AM   Result Value Ref Range    Sodium 134 (L) 135 - 145 mmol/L    Potassium 4.2 3.6 - 5.5 mmol/L    Chloride 100 96 - 112 mmol/L    Co2 25 20 - 33 mmol/L    Anion Gap 9.0 0.0 - 11.9    Glucose 143 (H) 65 - 99 mg/dL    Bun 8 8 - 22 mg/dL    Creatinine 0.69 0.50 - 1.40 mg/dL    Calcium 7.3 (L) 8.5 - 10.5 mg/dL    AST(SGOT) 154 (H) 12 - 45 U/L    ALT(SGPT) 52 (H) 2 - 50 U/L    Alkaline Phosphatase 90 30 - 99 U/L    Total Bilirubin 3.4 (H) 0.1 - 1.5 mg/dL    Albumin 3.1 (L) 3.2 - 4.9 g/dL    Total Protein 5.9 (L) 6.0 - 8.2 g/dL    Globulin 2.8 1.9 - 3.5 g/dL    A-G Ratio 1.1 g/dL   ESTIMATED GFR    Collection Time: 04/26/19  1:02 AM   Result Value Ref Range    GFR If African American >60 >60 mL/min/1.73 m 2    GFR If Non African American >60 >60 mL/min/1.73 m 2   ACCU-CHEK GLUCOSE    Collection Time: 04/26/19  6:32 AM   Result Value Ref Range    Glucose - Accu-Ck 126 (H) 65 - 99 mg/dL       Fluids    Intake/Output Summary (Last 24 hours) at 04/26/19 1337  Last data filed at 04/26/19 0440   Gross per 24 hour   Intake                0 ml   Output              700 ml   Net             -700 ml       Core Measures & Quality Metrics  Labs reviewed, Medications reviewed and Radiology images reviewed  Escobar catheter: No Escobar      DVT Prophylaxis: Not indicated at this time, ambulatory  DVT prophylaxis - mechanical: SCDs  Ulcer prophylaxis: Yes    Assessed for rehab: Patient unable to tolerate rehabilitation therapeutic regimen    TERRELL Score  ETOH Screening    Assessment/Plan  No new Assessment & Plan notes have been filed under this hospital service since the last note was generated.  Service: Surgery General      Discussed patient condition with RN and Patient. Dr. Fisher  CRITICAL CARE TIME EXCLUDING PROCEDURES: 20    minutes

## 2019-04-27 LAB
GLUCOSE BLD-MCNC: 110 MG/DL (ref 65–99)
GLUCOSE BLD-MCNC: 121 MG/DL (ref 65–99)
GLUCOSE BLD-MCNC: 175 MG/DL (ref 65–99)
GLUCOSE BLD-MCNC: 202 MG/DL (ref 65–99)
MAGNESIUM SERPL-MCNC: 1.3 MG/DL (ref 1.5–2.5)

## 2019-04-27 PROCEDURE — 700111 HCHG RX REV CODE 636 W/ 250 OVERRIDE (IP): Performed by: INTERNAL MEDICINE

## 2019-04-27 PROCEDURE — 700102 HCHG RX REV CODE 250 W/ 637 OVERRIDE(OP): Performed by: HOSPITALIST

## 2019-04-27 PROCEDURE — 82962 GLUCOSE BLOOD TEST: CPT | Mod: 91

## 2019-04-27 PROCEDURE — 700102 HCHG RX REV CODE 250 W/ 637 OVERRIDE(OP): Performed by: SURGERY

## 2019-04-27 PROCEDURE — 97161 PT EVAL LOW COMPLEX 20 MIN: CPT

## 2019-04-27 PROCEDURE — A9270 NON-COVERED ITEM OR SERVICE: HCPCS | Performed by: HOSPITALIST

## 2019-04-27 PROCEDURE — 770020 HCHG ROOM/CARE - TELE (206)

## 2019-04-27 PROCEDURE — 700111 HCHG RX REV CODE 636 W/ 250 OVERRIDE (IP): Performed by: HOSPITALIST

## 2019-04-27 PROCEDURE — A9270 NON-COVERED ITEM OR SERVICE: HCPCS | Performed by: INTERNAL MEDICINE

## 2019-04-27 PROCEDURE — 97166 OT EVAL MOD COMPLEX 45 MIN: CPT

## 2019-04-27 PROCEDURE — 36415 COLL VENOUS BLD VENIPUNCTURE: CPT

## 2019-04-27 PROCEDURE — 700102 HCHG RX REV CODE 250 W/ 637 OVERRIDE(OP): Performed by: INTERNAL MEDICINE

## 2019-04-27 PROCEDURE — 99233 SBSQ HOSP IP/OBS HIGH 50: CPT | Performed by: HOSPITALIST

## 2019-04-27 PROCEDURE — 83735 ASSAY OF MAGNESIUM: CPT

## 2019-04-27 PROCEDURE — A9270 NON-COVERED ITEM OR SERVICE: HCPCS | Performed by: SURGERY

## 2019-04-27 RX ORDER — MORPHINE SULFATE 30 MG/1
30 TABLET, FILM COATED, EXTENDED RELEASE ORAL EVERY 12 HOURS
Status: DISCONTINUED | OUTPATIENT
Start: 2019-04-27 | End: 2019-05-03 | Stop reason: HOSPADM

## 2019-04-27 RX ORDER — MAGNESIUM SULFATE HEPTAHYDRATE 40 MG/ML
2 INJECTION, SOLUTION INTRAVENOUS ONCE
Status: COMPLETED | OUTPATIENT
Start: 2019-04-27 | End: 2019-04-27

## 2019-04-27 RX ORDER — MAGNESIUM SULFATE 1 G/100ML
1 INJECTION INTRAVENOUS ONCE
Status: COMPLETED | OUTPATIENT
Start: 2019-04-27 | End: 2019-04-27

## 2019-04-27 RX ADMIN — POLYMYXIN B SULFATE, TRIMETHOPRIM SULFATE 1 DROP: 10000; 1 SOLUTION/ DROPS OPHTHALMIC at 19:57

## 2019-04-27 RX ADMIN — OXYCODONE HYDROCHLORIDE 10 MG: 5 TABLET ORAL at 15:02

## 2019-04-27 RX ADMIN — GABAPENTIN 900 MG: 300 CAPSULE ORAL at 17:45

## 2019-04-27 RX ADMIN — THERA TABS 1 TABLET: TAB at 05:21

## 2019-04-27 RX ADMIN — POLYMYXIN B SULFATE, TRIMETHOPRIM SULFATE 1 DROP: 10000; 1 SOLUTION/ DROPS OPHTHALMIC at 17:46

## 2019-04-27 RX ADMIN — MAGNESIUM SULFATE IN DEXTROSE 1 G: 10 INJECTION, SOLUTION INTRAVENOUS at 06:21

## 2019-04-27 RX ADMIN — MORPHINE SULFATE 15 MG: 15 TABLET, EXTENDED RELEASE ORAL at 05:21

## 2019-04-27 RX ADMIN — INSULIN HUMAN 2 UNITS: 100 INJECTION, SOLUTION PARENTERAL at 19:59

## 2019-04-27 RX ADMIN — METOPROLOL TARTRATE 100 MG: 50 TABLET ORAL at 05:21

## 2019-04-27 RX ADMIN — METOPROLOL TARTRATE 100 MG: 50 TABLET ORAL at 17:45

## 2019-04-27 RX ADMIN — FOLIC ACID 1 MG: 1 TABLET ORAL at 05:21

## 2019-04-27 RX ADMIN — MAGNESIUM SULFATE IN WATER 2 G: 40 INJECTION, SOLUTION INTRAVENOUS at 15:05

## 2019-04-27 RX ADMIN — POLYMYXIN B SULFATE, TRIMETHOPRIM SULFATE 1 DROP: 10000; 1 SOLUTION/ DROPS OPHTHALMIC at 08:59

## 2019-04-27 RX ADMIN — ASPIRIN 325 MG: 325 TABLET, FILM COATED ORAL at 05:21

## 2019-04-27 RX ADMIN — OXYCODONE HYDROCHLORIDE 10 MG: 5 TABLET ORAL at 19:57

## 2019-04-27 RX ADMIN — MAGNESIUM OXIDE TAB 400 MG (241.3 MG ELEMENTAL MG) 400 MG: 400 (241.3 MG) TAB at 17:45

## 2019-04-27 RX ADMIN — CALCIUM 500 MG: 500 TABLET ORAL at 05:21

## 2019-04-27 RX ADMIN — CALCIUM 500 MG: 500 TABLET ORAL at 12:13

## 2019-04-27 RX ADMIN — Medication 100 MG: at 05:21

## 2019-04-27 RX ADMIN — CALCIUM 500 MG: 500 TABLET ORAL at 17:45

## 2019-04-27 RX ADMIN — POLYMYXIN B SULFATE, TRIMETHOPRIM SULFATE 1 DROP: 10000; 1 SOLUTION/ DROPS OPHTHALMIC at 12:13

## 2019-04-27 RX ADMIN — QUETIAPINE FUMARATE 100 MG: 25 TABLET ORAL at 19:57

## 2019-04-27 RX ADMIN — OXYCODONE HYDROCHLORIDE 5 MG: 5 TABLET ORAL at 08:59

## 2019-04-27 RX ADMIN — GABAPENTIN 900 MG: 300 CAPSULE ORAL at 12:13

## 2019-04-27 RX ADMIN — GABAPENTIN 900 MG: 300 CAPSULE ORAL at 05:21

## 2019-04-27 RX ADMIN — INSULIN HUMAN 1 UNITS: 100 INJECTION, SOLUTION PARENTERAL at 12:11

## 2019-04-27 RX ADMIN — METFORMIN HYDROCHLORIDE 1000 MG: 500 TABLET, FILM COATED ORAL at 17:44

## 2019-04-27 RX ADMIN — ENOXAPARIN SODIUM 40 MG: 100 INJECTION SUBCUTANEOUS at 05:21

## 2019-04-27 RX ADMIN — MORPHINE SULFATE 30 MG: 30 TABLET, FILM COATED, EXTENDED RELEASE ORAL at 17:45

## 2019-04-27 RX ADMIN — DULOXETINE HYDROCHLORIDE 60 MG: 60 CAPSULE, DELAYED RELEASE ORAL at 05:21

## 2019-04-27 RX ADMIN — MORPHINE SULFATE 2 MG: 4 INJECTION INTRAVENOUS at 11:22

## 2019-04-27 ASSESSMENT — LIFESTYLE VARIABLES
PAROXYSMAL SWEATS: NO SWEAT VISIBLE
NAUSEA AND VOMITING: NO NAUSEA AND NO VOMITING
TREMOR: TREMOR NOT VISIBLE BUT CAN BE FELT, FINGERTIP TO FINGERTIP
ORIENTATION AND CLOUDING OF SENSORIUM: ORIENTED AND CAN DO SERIAL ADDITIONS
AUDITORY DISTURBANCES: NOT PRESENT
TREMOR: NO TREMOR
TOTAL SCORE: 0
AUDITORY DISTURBANCES: NOT PRESENT
ORIENTATION AND CLOUDING OF SENSORIUM: ORIENTED AND CAN DO SERIAL ADDITIONS
HEADACHE, FULLNESS IN HEAD: NOT PRESENT
TREMOR: NO TREMOR
PAROXYSMAL SWEATS: NO SWEAT VISIBLE
PAROXYSMAL SWEATS: NO SWEAT VISIBLE
AUDITORY DISTURBANCES: NOT PRESENT
PAROXYSMAL SWEATS: NO SWEAT VISIBLE
AUDITORY DISTURBANCES: NOT PRESENT
HEADACHE, FULLNESS IN HEAD: NOT PRESENT
NAUSEA AND VOMITING: NO NAUSEA AND NO VOMITING
ORIENTATION AND CLOUDING OF SENSORIUM: ORIENTED AND CAN DO SERIAL ADDITIONS
TOTAL SCORE: 0
NAUSEA AND VOMITING: NO NAUSEA AND NO VOMITING
AGITATION: NORMAL ACTIVITY
ANXIETY: NO ANXIETY (AT EASE)
PAROXYSMAL SWEATS: NO SWEAT VISIBLE
VISUAL DISTURBANCES: NOT PRESENT
VISUAL DISTURBANCES: NOT PRESENT
AGITATION: NORMAL ACTIVITY
TOTAL SCORE: 1
TREMOR: NO TREMOR
ANXIETY: MILDLY ANXIOUS
ANXIETY: NO ANXIETY (AT EASE)
HEADACHE, FULLNESS IN HEAD: NOT PRESENT
SUBSTANCE_ABUSE: 1
TREMOR: NO TREMOR
AGITATION: NORMAL ACTIVITY
ANXIETY: NO ANXIETY (AT EASE)
VISUAL DISTURBANCES: NOT PRESENT
VISUAL DISTURBANCES: NOT PRESENT
NAUSEA AND VOMITING: NO NAUSEA AND NO VOMITING
HEADACHE, FULLNESS IN HEAD: NOT PRESENT
ORIENTATION AND CLOUDING OF SENSORIUM: ORIENTED AND CAN DO SERIAL ADDITIONS
ORIENTATION AND CLOUDING OF SENSORIUM: ORIENTED AND CAN DO SERIAL ADDITIONS
AGITATION: NORMAL ACTIVITY
HEADACHE, FULLNESS IN HEAD: NOT PRESENT
NAUSEA AND VOMITING: NO NAUSEA AND NO VOMITING
AGITATION: NORMAL ACTIVITY
VISUAL DISTURBANCES: NOT PRESENT
ANXIETY: MILDLY ANXIOUS
TOTAL SCORE: 2
TOTAL SCORE: 0
AUDITORY DISTURBANCES: NOT PRESENT

## 2019-04-27 ASSESSMENT — ENCOUNTER SYMPTOMS
NECK PAIN: 0
SPEECH CHANGE: 0
STRIDOR: 0
WHEEZING: 0
FALLS: 1
EYE DISCHARGE: 1
PALPITATIONS: 0
TREMORS: 1
VOMITING: 0
FEVER: 0
ABDOMINAL PAIN: 0
SHORTNESS OF BREATH: 0
FLANK PAIN: 0
COUGH: 0
SENSORY CHANGE: 0
HALLUCINATIONS: 0
DIARRHEA: 0
CONSTIPATION: 0
EYE REDNESS: 0
CHILLS: 0
BACK PAIN: 0
FOCAL WEAKNESS: 0

## 2019-04-27 ASSESSMENT — GAIT ASSESSMENTS
DISTANCE (FEET): 5
GAIT LEVEL OF ASSIST: MINIMAL ASSIST
ASSISTIVE DEVICE: FRONT WHEEL WALKER
DEVIATION: SHUFFLED GAIT;ANTALGIC

## 2019-04-27 ASSESSMENT — COGNITIVE AND FUNCTIONAL STATUS - GENERAL
DRESSING REGULAR LOWER BODY CLOTHING: A LOT
DRESSING REGULAR UPPER BODY CLOTHING: A LOT
MOVING FROM LYING ON BACK TO SITTING ON SIDE OF FLAT BED: A LOT
SUGGESTED CMS G CODE MODIFIER DAILY ACTIVITY: CK
MOBILITY SCORE: 12
TOILETING: A LOT
MOVING TO AND FROM BED TO CHAIR: A LOT
WALKING IN HOSPITAL ROOM: A LOT
STANDING UP FROM CHAIR USING ARMS: A LITTLE
EATING MEALS: A LITTLE
SUGGESTED CMS G CODE MODIFIER MOBILITY: CL
TURNING FROM BACK TO SIDE WHILE IN FLAT BAD: A LOT
PERSONAL GROOMING: A LITTLE
DAILY ACTIVITIY SCORE: 14
HELP NEEDED FOR BATHING: A LOT
CLIMB 3 TO 5 STEPS WITH RAILING: TOTAL

## 2019-04-27 ASSESSMENT — ACTIVITIES OF DAILY LIVING (ADL): TOILETING: INDEPENDENT

## 2019-04-27 NOTE — THERAPY
"Occupational Therapy Evaluation completed.   Plan of Care: Will benefit from Occupational Therapy 3 times per week  Discharge Recommendations:  Equipment: Front-Wheel Walker, Grab Bars and Will Continue to Assess for Equipment Needs. Post-acute therapy Recommend inpatient transitional care services for continued occupational therapy services.     Patient presents with ETOH intoxication complicated with fall from toilet at home with intoxicated resulting in rib fractures and afib RVR. Patient report I with ADLs and mobility PTA. Patient, upon eval, presents with diminished balance, endurance, tolerance, strength, ADLs, and mobility necessitating Min A most UB ADLs and mobility with FWW. Patient would benefit from skilled OT in this setting followed by post acute placement. Patient may progress to DC home however at this time would benefit from placement consideration.     See \"Rehab Therapy-Acute\" Patient Summary Report for complete documentation.    "

## 2019-04-27 NOTE — PROGRESS NOTES
Hospital Medicine Daily Progress Note    Date of Service  4/27/2019    Chief Complaint  64 y.o. male admitted 4/24/2019 with alcohol intoxication, fall with left chest pain    Hospital Course    61-year-old male admitted with acute alcohol intoxication post fall while sitting on toilet.  Complaints of left-sided chest pain.  Findings of A. fib RVR.  Reports routinely drinks Black Velvet 2 L bottle over couple days.  Diagnosed with left-sided rib fractures.    Interval Problem Update    Generalized weakness, unsteady.  Reports left rib pain uncontrolled .  Rapid A. fib improved.     Consultants/Specialty    Dr. Reyna , surgery    Code Status    Full code    Disposition    Plan PT/OT    Review of Systems  Review of Systems   Constitutional: Positive for malaise/fatigue. Negative for chills and fever.   HENT: Negative for congestion.    Eyes: Positive for discharge (w crust - improved. ). Negative for redness.   Respiratory: Negative for cough, shortness of breath, wheezing and stridor.    Cardiovascular: Positive for chest pain (left rib pain ). Negative for palpitations and leg swelling.   Gastrointestinal: Negative for abdominal pain, constipation, diarrhea and vomiting.   Genitourinary: Negative for flank pain and hematuria.   Musculoskeletal: Positive for falls. Negative for back pain, joint pain and neck pain.   Skin: Negative for itching and rash.   Neurological: Positive for tremors. Negative for sensory change, speech change and focal weakness.   Psychiatric/Behavioral: Positive for substance abuse. Negative for hallucinations.        Physical Exam  Temp:  [36.1 °C (96.9 °F)-36.7 °C (98 °F)] 36.7 °C (98 °F)  Pulse:  [] 74  Resp:  [17-22] 17  BP: (114-151)/(64-93) 151/91  SpO2:  [92 %-96 %] 96 %    Physical Exam   Constitutional: He is oriented to person, place, and time. No distress.   HENT:   Head: Normocephalic.   Right Ear: External ear normal.   Left Ear: External ear normal.   Nose: Nose normal.    Left periorbital ecchymosis   Eyes: EOM are normal. No scleral icterus.   Decreased lid crest   Neck: Normal range of motion.   Cardiovascular:   No murmur heard.  Irregular, irregular   Pulmonary/Chest: No stridor. He has no wheezes. He has no rales. He exhibits tenderness (left chest ).   Diminished breath sounds at bases   Abdominal: Soft. He exhibits no distension. There is no tenderness.   Obese   Musculoskeletal: He exhibits no edema or tenderness.   No gross focal weakness   Neurological: He is alert and oriented to person, place, and time. No cranial nerve deficit.   Skin: Skin is warm and dry. He is not diaphoretic. No pallor.   Psychiatric: He has a normal mood and affect. His behavior is normal.   Vitals reviewed.      Fluids    Intake/Output Summary (Last 24 hours) at 04/27/19 1433  Last data filed at 04/27/19 1125   Gross per 24 hour   Intake           5892.9 ml   Output             2500 ml   Net           3392.9 ml       Laboratory  Recent Labs      04/25/19   0228  04/26/19   0102   WBC  4.9  9.4   RBC  3.48*  3.73*   HEMOGLOBIN  11.5*  12.2*   HEMATOCRIT  34.9*  37.7*   MCV  100.0*  101.1*   MCH  33.0  32.7   MCHC  33.0*  32.4*   RDW  54.9*  53.1*   PLATELETCT  143*  156*   MPV  9.6  10.3     Recent Labs      04/25/19   1037  04/26/19   0102  04/26/19   1504   SODIUM  136  134*  130*   POTASSIUM  4.2  4.2  3.9   CHLORIDE  105  100  98   CO2  26  25  23   GLUCOSE  174*  143*  143*   BUN  8  8  8   CREATININE  0.70  0.69  0.67   CALCIUM  7.2*  7.3*  7.4*                   Imaging  EC-ECHOCARDIOGRAM COMPLETE W/O CONT   Final Result   CONCLUSIONS  Normal left ventricular size and systolic function.  Mild concentric left ventricular hypertrophy.  The aortic valve is sclerotic with good leaflet mobility.  Estimated right ventricular systolic pressure  is 50 mmHg.  No prior study is available for comparison.   DX-CHEST-LIMITED (1 VIEW)   Final Result      1.  Cardiomegaly.      2.  Multiple left rib  fractures.      US-RUQ   Final Result      1.  Prior cholecystectomy. No evidence of biliary ductal dilatation.      2.  The liver is echogenic consistent with fatty change versus hepatocellular dysfunction.      CT-CHEST,ABDOMEN,PELVIS WITH   Final Result   Addendum 1 of 1   There are multiple old left rib fractures.   There are acute fractures of the left fifth, 6, seventh, ninth, and    possibly 10th ribs.   No pulmonary contusion identified.      Final      No acute injury identified.   4 mm right lower lobe nodule containing punctate calcification most likely granuloma.   Hepatic steatosis.   Surgical absence gallbladder.   Lobulated kidneys no hydronephrosis.   No evidence of bowel obstruction. No free fluid.      CT-CSPINE WITHOUT PLUS RECONS   Final Result      No acute fracture. Degenerative changes.      CT-HEAD W/O   Final Result      1.  Cerebral atrophy.      2.  White matter lucencies most consistent with small vessel ischemic change versus demyelination or gliosis.      3.  Otherwise, Head CT without contrast with no acute findings. No evidence of acute cerebral hemorrhage or mass lesion.           Assessment/Plan  * AF (atrial fibrillation) (HCC)   Assessment & Plan    New diagnosis of alcohol intoxication.  Patient does not have sensation of palpitations , unknown duration.  Echo preserved LV function  A. fib RVR -Lopressor recently increased-monitor heart rate with planned mobilization.  Benefits versus risk of anticoagulation were discussed with patient.  Patient situation complicated by frequent falls, active alcoholism, poor compliance resulting in significantly higher bleed risk.   Will refer to cardiology outpatient.   Continue aspirin therapy  Monitor telemetry       Alcoholic intoxication without complication (HCC)   Assessment & Plan    Decreased Ciwa score 2-3.  Continue as needed Ativan per CIWA protocol  Continue with scheduled Neurontin.    Thiamine, folate, multivitamin  Diet as  tolerated  Start to mobilize, PT/OT evaluation     Troponin level elevated   Assessment & Plan    Troponin indeterminate range-trending down.  Likely from A. fib RVR, demand.  Echo preserved LV function.  Telemetry  Follow clinically  Left chest pain is due to rib fractures.       Closed fracture of multiple ribs of left side with routine healing   Assessment & Plan    Uncontrolled left rib pain .  Patient followed by trauma surgery - plan supportive treatment  PRN oxycodone and IV morphine  Increase MS Contin  encourage I-S  Mobilize  O2 support as needed  PT/OT         Increased anion gap metabolic acidosis   Assessment & Plan    Likely etoh, starvation ketosis.  Resolved  Encourage intake.   Monitor CMP       Transaminitis   Assessment & Plan    High AST/ALT ratio, mild-likely alcoholic hepatitis.  Ultrasound findings of hepatocellular dysfunction  Monitor liver enzymes  Advised abstinence from alcohol     Conjunctivitis   Assessment & Plan    With crust and purulent drainage suspect bacterial-improved  Continue with Polytrim eyedrops-complete 5-day course     Hypocalcemia   Assessment & Plan    Improved  Replete low magnesium  Continue Ca carb.  Follow-up BMP       Hypomagnesemia   Assessment & Plan    Recurrent  Give IV magnesium with additional Mag-Ox  Follow-up magnesium     Lung nodule   Assessment & Plan    4 mm right lower lobe nodule containing punctate calcification most likely granuloma.  Outpatient followup recommended.      Hypokalemia   Assessment & Plan    Corrected  Follow up electrolytes.      Essential hypertension   Assessment & Plan    Continue with beta-blocker  Continue as needed Ativan for withdrawal symptoms  As needed clonidine for withdrawal benefits         Type 2 diabetes mellitus with diabetic polyneuropathy, without long-term current use of insulin (HCC)   Assessment & Plan    With hyperglycemia  Insulin sliding scale as needed.  Continue home gabapentin  Diabetic diet  Continue  metformin     Obesity- (present on admission)   Assessment & Plan    Weight loss counseling          VTE prophylaxis: SCDs    Discussed with case management plan of care

## 2019-04-27 NOTE — THERAPY
"Physical Therapy Evaluation completed.   Bed Mobility:  Supine to Sit: Minimal Assist  Transfers: Sit to Stand: Minimal Assist  Gait: Level Of Assist: Minimal Assist with Front-Wheel Walker x5 feet.      Plan of Care: Will benefit from Physical Therapy 4 times per week  Discharge Recommendations: Equipment: Front-Wheel Walker. Post-acute therapy Recommend inpatient transitional care services for continued physical therapy services.      PT eval completed. Pt presents to acute PT s/p rib fxs due to fall and general debilitation with subsequent pain, weakness, limited activity tolerance, and impaired motor control. Pt is limited in WB activity tolerance including ambulation and transfers as well as mobility endurance. Pt will continue to benefit from skilled acute PT to address impairments and assist with D/C planning. At this time, pt will likely require placement in transitional care setting for further therapy services prior to D/C home.    See \"Rehab Therapy-Acute\" Patient Summary Report for complete documentation.     "

## 2019-04-27 NOTE — PROGRESS NOTES
· 2 RN skin check complete with ALEXANDRE Alvarado.  · Devices in place O2 tubing.  · Skin assessed under devices pink, blanching.  · Confirmed pressure ulcers found on pannus red/moist/open on L, L eye bruising from previous fall, feet dry calloused, sacrum red, blanching, ears red/ slow blanching.  · New potential pressure ulcers noted on NA. Wound consult previously placed and wound reported.  · The following interventions in place Waffle bed, condom cath, interdry, foam, mepilex

## 2019-04-27 NOTE — PROGRESS NOTES
Report received from ALEXANDRE Weems. Plan of care reviewed with patient, denies any questions. No needs voiced at this time. Call light within reach, bed locked and in lowest position.

## 2019-04-28 PROBLEM — G47.33 OSA ON CPAP: Status: ACTIVE | Noted: 2019-04-28

## 2019-04-28 PROBLEM — J96.01 ACUTE HYPOXEMIC RESPIRATORY FAILURE (HCC): Status: ACTIVE | Noted: 2019-04-28

## 2019-04-28 LAB
ANION GAP SERPL CALC-SCNC: 6 MMOL/L (ref 0–11.9)
BUN SERPL-MCNC: 7 MG/DL (ref 8–22)
CALCIUM SERPL-MCNC: 8.3 MG/DL (ref 8.5–10.5)
CHLORIDE SERPL-SCNC: 97 MMOL/L (ref 96–112)
CO2 SERPL-SCNC: 29 MMOL/L (ref 20–33)
CREAT SERPL-MCNC: 0.69 MG/DL (ref 0.5–1.4)
GLUCOSE BLD-MCNC: 117 MG/DL (ref 65–99)
GLUCOSE BLD-MCNC: 117 MG/DL (ref 65–99)
GLUCOSE BLD-MCNC: 122 MG/DL (ref 65–99)
GLUCOSE BLD-MCNC: 127 MG/DL (ref 65–99)
GLUCOSE SERPL-MCNC: 138 MG/DL (ref 65–99)
MAGNESIUM SERPL-MCNC: 1.6 MG/DL (ref 1.5–2.5)
POTASSIUM SERPL-SCNC: 4.1 MMOL/L (ref 3.6–5.5)
SODIUM SERPL-SCNC: 132 MMOL/L (ref 135–145)
VIT B12 SERPL-MCNC: >1500 PG/ML (ref 211–911)

## 2019-04-28 PROCEDURE — 82607 VITAMIN B-12: CPT

## 2019-04-28 PROCEDURE — A9270 NON-COVERED ITEM OR SERVICE: HCPCS | Performed by: HOSPITALIST

## 2019-04-28 PROCEDURE — A9270 NON-COVERED ITEM OR SERVICE: HCPCS | Performed by: INTERNAL MEDICINE

## 2019-04-28 PROCEDURE — 99233 SBSQ HOSP IP/OBS HIGH 50: CPT | Performed by: HOSPITALIST

## 2019-04-28 PROCEDURE — 770020 HCHG ROOM/CARE - TELE (206)

## 2019-04-28 PROCEDURE — 700102 HCHG RX REV CODE 250 W/ 637 OVERRIDE(OP): Performed by: HOSPITALIST

## 2019-04-28 PROCEDURE — 700111 HCHG RX REV CODE 636 W/ 250 OVERRIDE (IP): Performed by: INTERNAL MEDICINE

## 2019-04-28 PROCEDURE — 700111 HCHG RX REV CODE 636 W/ 250 OVERRIDE (IP): Performed by: HOSPITALIST

## 2019-04-28 PROCEDURE — 80048 BASIC METABOLIC PNL TOTAL CA: CPT

## 2019-04-28 PROCEDURE — 700102 HCHG RX REV CODE 250 W/ 637 OVERRIDE(OP): Performed by: INTERNAL MEDICINE

## 2019-04-28 PROCEDURE — 82962 GLUCOSE BLOOD TEST: CPT | Mod: 91

## 2019-04-28 PROCEDURE — 83735 ASSAY OF MAGNESIUM: CPT

## 2019-04-28 PROCEDURE — 36415 COLL VENOUS BLD VENIPUNCTURE: CPT

## 2019-04-28 RX ORDER — MORPHINE SULFATE 4 MG/ML
1-2 INJECTION, SOLUTION INTRAMUSCULAR; INTRAVENOUS EVERY 4 HOURS PRN
Status: DISCONTINUED | OUTPATIENT
Start: 2019-04-28 | End: 2019-05-02

## 2019-04-28 RX ORDER — MAGNESIUM SULFATE HEPTAHYDRATE 40 MG/ML
2 INJECTION, SOLUTION INTRAVENOUS ONCE
Status: COMPLETED | OUTPATIENT
Start: 2019-04-28 | End: 2019-04-28

## 2019-04-28 RX ORDER — TETRAHYDROZOLINE HCL 0.05 %
1 DROPS OPHTHALMIC (EYE) 4 TIMES DAILY PRN
Status: DISCONTINUED | OUTPATIENT
Start: 2019-04-28 | End: 2019-05-03 | Stop reason: HOSPADM

## 2019-04-28 RX ORDER — LORAZEPAM 1 MG/1
1 TABLET ORAL EVERY 4 HOURS PRN
Status: DISCONTINUED | OUTPATIENT
Start: 2019-04-28 | End: 2019-05-03 | Stop reason: HOSPADM

## 2019-04-28 RX ADMIN — FOLIC ACID 1 MG: 1 TABLET ORAL at 05:33

## 2019-04-28 RX ADMIN — POLYMYXIN B SULFATE, TRIMETHOPRIM SULFATE 1 DROP: 10000; 1 SOLUTION/ DROPS OPHTHALMIC at 17:08

## 2019-04-28 RX ADMIN — MORPHINE SULFATE 30 MG: 30 TABLET, FILM COATED, EXTENDED RELEASE ORAL at 17:12

## 2019-04-28 RX ADMIN — MAGNESIUM OXIDE TAB 400 MG (241.3 MG ELEMENTAL MG) 400 MG: 400 (241.3 MG) TAB at 17:12

## 2019-04-28 RX ADMIN — OXYCODONE HYDROCHLORIDE 10 MG: 5 TABLET ORAL at 00:38

## 2019-04-28 RX ADMIN — OXYCODONE HYDROCHLORIDE 10 MG: 5 TABLET ORAL at 15:33

## 2019-04-28 RX ADMIN — MORPHINE SULFATE 30 MG: 30 TABLET, FILM COATED, EXTENDED RELEASE ORAL at 05:31

## 2019-04-28 RX ADMIN — SENNOSIDES,DOCUSATE SODIUM 2 TABLET: 8.6; 5 TABLET, FILM COATED ORAL at 17:09

## 2019-04-28 RX ADMIN — CALCIUM 500 MG: 500 TABLET ORAL at 11:07

## 2019-04-28 RX ADMIN — MAGNESIUM SULFATE IN WATER 2 G: 40 INJECTION, SOLUTION INTRAVENOUS at 14:49

## 2019-04-28 RX ADMIN — GABAPENTIN 900 MG: 300 CAPSULE ORAL at 05:31

## 2019-04-28 RX ADMIN — THERA TABS 1 TABLET: TAB at 05:33

## 2019-04-28 RX ADMIN — METFORMIN HYDROCHLORIDE 1000 MG: 500 TABLET, FILM COATED ORAL at 08:38

## 2019-04-28 RX ADMIN — DULOXETINE HYDROCHLORIDE 60 MG: 60 CAPSULE, DELAYED RELEASE ORAL at 05:32

## 2019-04-28 RX ADMIN — POLYMYXIN B SULFATE, TRIMETHOPRIM SULFATE 1 DROP: 10000; 1 SOLUTION/ DROPS OPHTHALMIC at 20:44

## 2019-04-28 RX ADMIN — POLYMYXIN B SULFATE, TRIMETHOPRIM SULFATE 1 DROP: 10000; 1 SOLUTION/ DROPS OPHTHALMIC at 13:00

## 2019-04-28 RX ADMIN — ENOXAPARIN SODIUM 40 MG: 100 INJECTION SUBCUTANEOUS at 05:36

## 2019-04-28 RX ADMIN — OXYCODONE HYDROCHLORIDE 10 MG: 5 TABLET ORAL at 08:47

## 2019-04-28 RX ADMIN — METOPROLOL TARTRATE 100 MG: 50 TABLET ORAL at 05:31

## 2019-04-28 RX ADMIN — OXYCODONE HYDROCHLORIDE 10 MG: 5 TABLET ORAL at 20:46

## 2019-04-28 RX ADMIN — Medication 100 MG: at 05:33

## 2019-04-28 RX ADMIN — QUETIAPINE FUMARATE 100 MG: 25 TABLET ORAL at 20:43

## 2019-04-28 RX ADMIN — POLYMYXIN B SULFATE, TRIMETHOPRIM SULFATE 1 DROP: 10000; 1 SOLUTION/ DROPS OPHTHALMIC at 08:38

## 2019-04-28 RX ADMIN — ASPIRIN 325 MG: 325 TABLET, FILM COATED ORAL at 05:33

## 2019-04-28 RX ADMIN — METOPROLOL TARTRATE 100 MG: 50 TABLET ORAL at 17:13

## 2019-04-28 RX ADMIN — CALCIUM 500 MG: 500 TABLET ORAL at 08:38

## 2019-04-28 RX ADMIN — GABAPENTIN 900 MG: 300 CAPSULE ORAL at 11:07

## 2019-04-28 RX ADMIN — CALCIUM 500 MG: 500 TABLET ORAL at 17:12

## 2019-04-28 RX ADMIN — GABAPENTIN 900 MG: 300 CAPSULE ORAL at 17:11

## 2019-04-28 RX ADMIN — MAGNESIUM OXIDE TAB 400 MG (241.3 MG ELEMENTAL MG) 400 MG: 400 (241.3 MG) TAB at 05:33

## 2019-04-28 RX ADMIN — METFORMIN HYDROCHLORIDE 1000 MG: 500 TABLET, FILM COATED ORAL at 17:11

## 2019-04-28 ASSESSMENT — LIFESTYLE VARIABLES
TREMOR: NO TREMOR
PAROXYSMAL SWEATS: NO SWEAT VISIBLE
AGITATION: NORMAL ACTIVITY
SUBSTANCE_ABUSE: 1
ORIENTATION AND CLOUDING OF SENSORIUM: ORIENTED AND CAN DO SERIAL ADDITIONS
ANXIETY: NO ANXIETY (AT EASE)
TOTAL SCORE: 0
NAUSEA AND VOMITING: NO NAUSEA AND NO VOMITING
AUDITORY DISTURBANCES: NOT PRESENT
VISUAL DISTURBANCES: NOT PRESENT
HEADACHE, FULLNESS IN HEAD: NOT PRESENT

## 2019-04-28 ASSESSMENT — ENCOUNTER SYMPTOMS
ABDOMINAL PAIN: 0
VOMITING: 0
CONSTIPATION: 0
EYE REDNESS: 0
FALLS: 1
COUGH: 0
FLANK PAIN: 0
SENSORY CHANGE: 0
PALPITATIONS: 0
NECK PAIN: 0
TREMORS: 1
SPEECH CHANGE: 0
STRIDOR: 0
DIARRHEA: 0
FOCAL WEAKNESS: 0
HALLUCINATIONS: 0
SHORTNESS OF BREATH: 0
FEVER: 0
EYE DISCHARGE: 1
CHILLS: 0
BACK PAIN: 0

## 2019-04-28 NOTE — PROGRESS NOTES
Report received from ALEXANDRE Carranza. Plan of care reviewed with patient, denies any questions. No needs voiced at this time. Call light within reach, bed locked and in lowest position.

## 2019-04-28 NOTE — PROGRESS NOTES
Hospital Medicine Daily Progress Note    Date of Service  4/28/2019    Chief Complaint  64 y.o. male admitted 4/24/2019 with alcohol intoxication, fall with left chest pain    Hospital Course    61-year-old male admitted with acute alcohol intoxication post fall while sitting on toilet.  Complaints of left-sided chest pain.  Findings of A. fib RVR.  Reports routinely drinks Black Velvet 2 L bottle over couple days.  Diagnosed with left-sided rib fractures.    Interval Problem Update    Still very weak, mobilizing with walker.  A. fib RVR improved rate.  Hypomagnesemia.  Hypoxic trying to wean down O2    Consultants/Specialty    Dr. Reyna , surgery    Code Status    Full code    Disposition    Plan PT/OT, wean O2    Review of Systems  Review of Systems   Constitutional: Positive for malaise/fatigue. Negative for chills and fever.   HENT: Negative for congestion.    Eyes: Positive for discharge (w crust - improved.  Notes dryness). Negative for redness.   Respiratory: Negative for cough, shortness of breath and stridor.    Cardiovascular: Positive for chest pain (left rib pain ). Negative for palpitations and leg swelling.   Gastrointestinal: Negative for abdominal pain, constipation, diarrhea and vomiting.   Genitourinary: Negative for flank pain and hematuria.   Musculoskeletal: Positive for falls. Negative for back pain, joint pain and neck pain.   Neurological: Positive for tremors. Negative for sensory change, speech change and focal weakness.   Psychiatric/Behavioral: Positive for substance abuse. Negative for hallucinations.        Physical Exam  Temp:  [36 °C (96.8 °F)-36.6 °C (97.9 °F)] 36.3 °C (97.4 °F)  Pulse:  [] 86  Resp:  [14-18] 14  BP: (101-165)/(69-97) 104/74  SpO2:  [89 %-98 %] 91 %    Physical Exam   Constitutional: He is oriented to person, place, and time. No distress.   HENT:   Head: Normocephalic.   Right Ear: External ear normal.   Left Ear: External ear normal.   Nose: Nose normal.    Left periorbital ecchymosis   Eyes: EOM are normal. No scleral icterus.   No purulent drainage  Eyes mildly injected   Neck: Normal range of motion.   Cardiovascular:   No murmur heard.  Irregular, irregular   Pulmonary/Chest: No stridor. He has no wheezes. He has no rales. He exhibits tenderness (left chest ).   Diminished breath sounds at bases   Abdominal: Soft. He exhibits no distension. There is no tenderness.   Obese   Musculoskeletal: He exhibits no edema or tenderness.   Generalized 4/5 strength   Neurological: He is alert and oriented to person, place, and time. No cranial nerve deficit.   No focal weakness   Skin: Skin is warm and dry. He is not diaphoretic. No pallor.   Vitals reviewed.      Fluids    Intake/Output Summary (Last 24 hours) at 04/28/19 1400  Last data filed at 04/28/19 1300   Gross per 24 hour   Intake              540 ml   Output             1300 ml   Net             -760 ml       Laboratory  Recent Labs      04/26/19   0102   WBC  9.4   RBC  3.73*   HEMOGLOBIN  12.2*   HEMATOCRIT  37.7*   MCV  101.1*   MCH  32.7   MCHC  32.4*   RDW  53.1*   PLATELETCT  156*   MPV  10.3     Recent Labs      04/26/19   0102  04/26/19   1504  04/28/19   0214   SODIUM  134*  130*  132*   POTASSIUM  4.2  3.9  4.1   CHLORIDE  100  98  97   CO2  25  23  29   GLUCOSE  143*  143*  138*   BUN  8  8  7*   CREATININE  0.69  0.67  0.69   CALCIUM  7.3*  7.4*  8.3*                   Imaging  EC-ECHOCARDIOGRAM COMPLETE W/O CONT   Final Result   CONCLUSIONS  Normal left ventricular size and systolic function.  Mild concentric left ventricular hypertrophy.  The aortic valve is sclerotic with good leaflet mobility.  Estimated right ventricular systolic pressure  is 50 mmHg.  No prior study is available for comparison.   DX-CHEST-LIMITED (1 VIEW)   Final Result      1.  Cardiomegaly.      2.  Multiple left rib fractures.      US-RUQ   Final Result      1.  Prior cholecystectomy. No evidence of biliary ductal dilatation.       2.  The liver is echogenic consistent with fatty change versus hepatocellular dysfunction.      CT-CHEST,ABDOMEN,PELVIS WITH   Final Result   Addendum 1 of 1   There are multiple old left rib fractures.   There are acute fractures of the left fifth, 6, seventh, ninth, and    possibly 10th ribs.   No pulmonary contusion identified.      Final      No acute injury identified.   4 mm right lower lobe nodule containing punctate calcification most likely granuloma.   Hepatic steatosis.   Surgical absence gallbladder.   Lobulated kidneys no hydronephrosis.   No evidence of bowel obstruction. No free fluid.      CT-CSPINE WITHOUT PLUS RECONS   Final Result      No acute fracture. Degenerative changes.      CT-HEAD W/O   Final Result      1.  Cerebral atrophy.      2.  White matter lucencies most consistent with small vessel ischemic change versus demyelination or gliosis.      3.  Otherwise, Head CT without contrast with no acute findings. No evidence of acute cerebral hemorrhage or mass lesion.           Assessment/Plan  * AF (atrial fibrillation) (HCC)   Assessment & Plan    New diagnosis of alcohol intoxication.  Patient does not have sensation of palpitations , unknown duration.  Echo preserved LV function  A. fib RVR -improved--continue Lopressor  Benefits versus risk of anticoagulation were discussed with patient.  Patient situation complicated by frequent falls, active alcoholism, poor compliance resulting in significantly higher bleed risk.     Continue aspirin therapy  Monitor telemetry  Outpatient referral to cardiology  Correct low magnesium     Acute hypoxemic respiratory failure (HCC)   Assessment & Plan    O2 support  Encourage I-S  Control rib pain     Alcoholic intoxication without complication (HCC)   Assessment & Plan    Withdrawal symptoms resolving.  Scheduled Neurontin  Thiamine, folate, multivitamin  Diet as tolerated  Plan PT/OT , try to mobilize.     Troponin level elevated   Assessment & Plan     Troponin indeterminate range-trending down.  Likely from A. fib RVR, demand.  Echo preserved LV function.  Telemetry  Follow clinically  Left chest pain is due to rib fractures.       Closed fracture of multiple ribs of left side with routine healing   Assessment & Plan    Uncontrolled left rib pain .  Patient followed by trauma surgery - plan supportive treatment  PRN oxycodone and IV morphine  Continue MS Contin  encourage I-S  Mobilize with walker  O2 support as needed  PT/OT         Increased anion gap metabolic acidosis   Assessment & Plan    Likely etoh, starvation ketosis.  Resolved  Encourage intake.   Monitor CMP       Transaminitis   Assessment & Plan    High AST/ALT ratio, mild-likely alcoholic hepatitis.  Ultrasound findings of hepatocellular dysfunction  Monitor liver enzymes  Advised abstinence from alcohol     Conjunctivitis   Assessment & Plan    With crust and purulent drainage suspect bacterial-improved  Continue with Polytrim eyedrops-complete 5-day course  Advising for irritation dryness     Hypocalcemia   Assessment & Plan    Correcting  IV magnesium  Continue Ca carb.  Follow-up BMP       Hypomagnesemia   Assessment & Plan    Persistent  Continue oral magnesium.  Additional IV magnesium  Follow-up magnesium     Lung nodule   Assessment & Plan    4 mm right lower lobe nodule containing punctate calcification most likely granuloma.  Outpatient followup recommended.      Hypokalemia   Assessment & Plan    Corrected  Follow up electrolytes.      Essential hypertension   Assessment & Plan    Continue with beta-blocker  As needed clonidine for withdrawal benefits  Monitor BP         Type 2 diabetes mellitus with diabetic polyneuropathy, without long-term current use of insulin (HCC)   Assessment & Plan    With hyperglycemia, labile blood sugars  Insulin sliding scale as needed.  Continue home gabapentin  Diabetic diet  Continue metformin     Obesity- (present on admission)   Assessment & Plan     Weight loss counseling     PHILLY on CPAP   Assessment & Plan    Resume          VTE prophylaxis: SCDs    Discussed with multidisciplinary team plan of care.

## 2019-04-28 NOTE — PROGRESS NOTES
· 2 RN skin check complete with ALEXANDRE Atwood.  · Devices in place O2 nasal cannula, tele box  · Skin assessed under devices pink, blanching.  · Confirmed pressure ulcers found on pannus red/moist, left eye bruise from previous fall, feet dry calloused, sacrum red, blanching, ears red/ slow blanching.  · New potential pressure ulcers noted on NA. Wound consult previously placed and wound reported.  · The following interventions in place Waffle bed, condom cath, interdry, foam, mepilex, Q 2 turns

## 2019-04-28 NOTE — CARE PLAN
Problem: Communication  Goal: The ability to communicate needs accurately and effectively will improve  Outcome: PROGRESSING AS EXPECTED  Patient is able to communicate needs    Problem: Safety  Goal: Will remain free from injury  Outcome: PROGRESSING AS EXPECTED  Patient remains free from injury    Problem: Knowledge Deficit  Goal: Knowledge of disease process/condition, treatment plan, diagnostic tests, and medications will improve  Outcome: PROGRESSING AS EXPECTED  Pt verbalizes understanding

## 2019-04-28 NOTE — PROGRESS NOTES
Received report from Amber SCHROEDER, at pt bedside. Pt resting in bed, no complaints at this time, POC discussed. Call light and belongings within reach. Bed locked and in low position. Alarm on and fall precautions in place.

## 2019-04-28 NOTE — PROGRESS NOTES
· 2 RN skin check complete with ALEXANDRE Masters.  · Devices in place: Waffle mattress, Q 2 hour turns.  · Skin assessed under devices Yes.  · Confirmed pressure ulcers found on N/A.  · New potential pressure ulcers noted on N/A. Wound consult placed and wound reported.  · The following interventions in place Waffle mattress and Q 2 hour turns, silicone NC, inter dry under pannus.   Bilateral ears are red and blanching  Bilateral elbows are red and blanching with right arm bruising around forearm  Pannus is red and blanching   Sacrum is red and blanching  Bilateral heels are red and blanching

## 2019-04-28 NOTE — DISCHARGE PLANNING
Care Transition Team Discharge Planning    Anticipated Discharge Disposition: d/c to rehab    Action: Spoke to bedside RN. Wife is requesting list of SNFs. Pt not d/cing now. MD has not placed order yet.    Lsw provided [facility] and [facility] choice lists. RN will provide to wife.     Barriers to Discharge: order, choice, acceptance, open bed, medical clearance     Plan: f/u w/ medical team, pt/family, CCA

## 2019-04-28 NOTE — PROGRESS NOTES
· 2 RN skin check complete with ALEXANDRE Cunningham.  · Devices in place Oxygen, Condom cath.  · Skin assessed under devices yes.  · Confirmed pressure ulcers found on pannus red and moist.  · New potential pressure ulcers noted on N/A. Wound consult placed and wound reported.  · The following interventions in place Waffle cushion, turn every two hours, mepilex to sacrum, interdry to pannus, float heels, silicone oxygen tubing, grey foam, barrier cream  · Bruising to left eye, red and moist to pannus, blanchable redness to ears, heels and elbows. Sacrum pink and blanching.

## 2019-04-29 LAB
ALBUMIN SERPL BCP-MCNC: 3.3 G/DL (ref 3.2–4.9)
ALBUMIN/GLOB SERPL: 1.1 G/DL
ALP SERPL-CCNC: 99 U/L (ref 30–99)
ALT SERPL-CCNC: 39 U/L (ref 2–50)
ANION GAP SERPL CALC-SCNC: 8 MMOL/L (ref 0–11.9)
AST SERPL-CCNC: 95 U/L (ref 12–45)
BILIRUB SERPL-MCNC: 2.5 MG/DL (ref 0.1–1.5)
BUN SERPL-MCNC: 7 MG/DL (ref 8–22)
CALCIUM SERPL-MCNC: 8.9 MG/DL (ref 8.5–10.5)
CHLORIDE SERPL-SCNC: 97 MMOL/L (ref 96–112)
CO2 SERPL-SCNC: 29 MMOL/L (ref 20–33)
CREAT SERPL-MCNC: 0.53 MG/DL (ref 0.5–1.4)
GLOBULIN SER CALC-MCNC: 3 G/DL (ref 1.9–3.5)
GLUCOSE BLD-MCNC: 104 MG/DL (ref 65–99)
GLUCOSE BLD-MCNC: 82 MG/DL (ref 65–99)
GLUCOSE BLD-MCNC: 90 MG/DL (ref 65–99)
GLUCOSE BLD-MCNC: 96 MG/DL (ref 65–99)
GLUCOSE SERPL-MCNC: 132 MG/DL (ref 65–99)
POTASSIUM SERPL-SCNC: 4.6 MMOL/L (ref 3.6–5.5)
PROT SERPL-MCNC: 6.3 G/DL (ref 6–8.2)
SODIUM SERPL-SCNC: 134 MMOL/L (ref 135–145)

## 2019-04-29 PROCEDURE — 82962 GLUCOSE BLOOD TEST: CPT

## 2019-04-29 PROCEDURE — 36415 COLL VENOUS BLD VENIPUNCTURE: CPT

## 2019-04-29 PROCEDURE — 700102 HCHG RX REV CODE 250 W/ 637 OVERRIDE(OP): Performed by: HOSPITALIST

## 2019-04-29 PROCEDURE — A9270 NON-COVERED ITEM OR SERVICE: HCPCS | Performed by: INTERNAL MEDICINE

## 2019-04-29 PROCEDURE — 700102 HCHG RX REV CODE 250 W/ 637 OVERRIDE(OP): Performed by: INTERNAL MEDICINE

## 2019-04-29 PROCEDURE — 770020 HCHG ROOM/CARE - TELE (206)

## 2019-04-29 PROCEDURE — 700101 HCHG RX REV CODE 250: Performed by: HOSPITALIST

## 2019-04-29 PROCEDURE — 99232 SBSQ HOSP IP/OBS MODERATE 35: CPT | Performed by: HOSPITALIST

## 2019-04-29 PROCEDURE — 80053 COMPREHEN METABOLIC PANEL: CPT

## 2019-04-29 PROCEDURE — 700111 HCHG RX REV CODE 636 W/ 250 OVERRIDE (IP): Performed by: INTERNAL MEDICINE

## 2019-04-29 PROCEDURE — A9270 NON-COVERED ITEM OR SERVICE: HCPCS | Performed by: HOSPITALIST

## 2019-04-29 RX ORDER — LIDOCAINE 50 MG/G
1-2 PATCH TOPICAL EVERY 24 HOURS
Status: DISCONTINUED | OUTPATIENT
Start: 2019-04-29 | End: 2019-05-03 | Stop reason: HOSPADM

## 2019-04-29 RX ADMIN — SENNOSIDES,DOCUSATE SODIUM 2 TABLET: 8.6; 5 TABLET, FILM COATED ORAL at 05:27

## 2019-04-29 RX ADMIN — MAGNESIUM OXIDE TAB 400 MG (241.3 MG ELEMENTAL MG) 400 MG: 400 (241.3 MG) TAB at 05:27

## 2019-04-29 RX ADMIN — METOPROLOL TARTRATE 100 MG: 50 TABLET ORAL at 18:41

## 2019-04-29 RX ADMIN — POLYMYXIN B SULFATE, TRIMETHOPRIM SULFATE 1 DROP: 10000; 1 SOLUTION/ DROPS OPHTHALMIC at 08:32

## 2019-04-29 RX ADMIN — Medication 100 MG: at 05:27

## 2019-04-29 RX ADMIN — OXYCODONE HYDROCHLORIDE 10 MG: 5 TABLET ORAL at 01:03

## 2019-04-29 RX ADMIN — SENNOSIDES,DOCUSATE SODIUM 2 TABLET: 8.6; 5 TABLET, FILM COATED ORAL at 18:41

## 2019-04-29 RX ADMIN — ENOXAPARIN SODIUM 40 MG: 100 INJECTION SUBCUTANEOUS at 05:28

## 2019-04-29 RX ADMIN — GABAPENTIN 900 MG: 300 CAPSULE ORAL at 12:33

## 2019-04-29 RX ADMIN — OXYCODONE HYDROCHLORIDE 10 MG: 5 TABLET ORAL at 12:32

## 2019-04-29 RX ADMIN — LORAZEPAM 1 MG: 1 TABLET ORAL at 13:28

## 2019-04-29 RX ADMIN — POLYMYXIN B SULFATE, TRIMETHOPRIM SULFATE 1 DROP: 10000; 1 SOLUTION/ DROPS OPHTHALMIC at 20:02

## 2019-04-29 RX ADMIN — ACETAMINOPHEN 650 MG: 325 TABLET, FILM COATED ORAL at 08:31

## 2019-04-29 RX ADMIN — CALCIUM 500 MG: 500 TABLET ORAL at 18:41

## 2019-04-29 RX ADMIN — QUETIAPINE FUMARATE 100 MG: 25 TABLET ORAL at 20:03

## 2019-04-29 RX ADMIN — LIDOCAINE 1 PATCH: 50 PATCH TOPICAL at 18:41

## 2019-04-29 RX ADMIN — METOPROLOL TARTRATE 100 MG: 50 TABLET ORAL at 05:27

## 2019-04-29 RX ADMIN — OXYCODONE HYDROCHLORIDE 10 MG: 5 TABLET ORAL at 08:31

## 2019-04-29 RX ADMIN — GABAPENTIN 900 MG: 300 CAPSULE ORAL at 18:41

## 2019-04-29 RX ADMIN — GABAPENTIN 900 MG: 300 CAPSULE ORAL at 05:27

## 2019-04-29 RX ADMIN — ASPIRIN 325 MG: 325 TABLET, FILM COATED ORAL at 06:00

## 2019-04-29 RX ADMIN — POLYMYXIN B SULFATE, TRIMETHOPRIM SULFATE 1 DROP: 10000; 1 SOLUTION/ DROPS OPHTHALMIC at 18:41

## 2019-04-29 RX ADMIN — MORPHINE SULFATE 30 MG: 30 TABLET, FILM COATED, EXTENDED RELEASE ORAL at 18:41

## 2019-04-29 RX ADMIN — METFORMIN HYDROCHLORIDE 1000 MG: 500 TABLET, FILM COATED ORAL at 06:00

## 2019-04-29 RX ADMIN — CALCIUM 500 MG: 500 TABLET ORAL at 12:32

## 2019-04-29 RX ADMIN — MORPHINE SULFATE 30 MG: 30 TABLET, FILM COATED, EXTENDED RELEASE ORAL at 05:27

## 2019-04-29 RX ADMIN — DULOXETINE HYDROCHLORIDE 60 MG: 60 CAPSULE, DELAYED RELEASE ORAL at 05:27

## 2019-04-29 RX ADMIN — METFORMIN HYDROCHLORIDE 1000 MG: 500 TABLET, FILM COATED ORAL at 18:41

## 2019-04-29 RX ADMIN — POLYMYXIN B SULFATE, TRIMETHOPRIM SULFATE 1 DROP: 10000; 1 SOLUTION/ DROPS OPHTHALMIC at 12:33

## 2019-04-29 RX ADMIN — CALCIUM 500 MG: 500 TABLET ORAL at 05:27

## 2019-04-29 RX ADMIN — HYDRALAZINE HYDROCHLORIDE 10 MG: 20 INJECTION INTRAMUSCULAR; INTRAVENOUS at 04:08

## 2019-04-29 RX ADMIN — MAGNESIUM OXIDE TAB 400 MG (241.3 MG ELEMENTAL MG) 400 MG: 400 (241.3 MG) TAB at 18:41

## 2019-04-29 RX ADMIN — FOLIC ACID 1 MG: 1 TABLET ORAL at 05:27

## 2019-04-29 RX ADMIN — THERA TABS 1 TABLET: TAB at 05:27

## 2019-04-29 ASSESSMENT — ENCOUNTER SYMPTOMS
STRIDOR: 0
WEAKNESS: 1
EYE DISCHARGE: 0
FEVER: 0
COUGH: 0
EYE REDNESS: 0
SHORTNESS OF BREATH: 0
NECK PAIN: 0
FLANK PAIN: 0
BACK PAIN: 0
CONSTIPATION: 0
HALLUCINATIONS: 0
FOCAL WEAKNESS: 0
CHILLS: 0
SPEECH CHANGE: 0
ABDOMINAL PAIN: 0
DIARRHEA: 0
FALLS: 1
SENSORY CHANGE: 0
VOMITING: 0
TREMORS: 0
PALPITATIONS: 0

## 2019-04-29 ASSESSMENT — PATIENT HEALTH QUESTIONNAIRE - PHQ9
SUM OF ALL RESPONSES TO PHQ9 QUESTIONS 1 AND 2: 0
2. FEELING DOWN, DEPRESSED, IRRITABLE, OR HOPELESS: NOT AT ALL
1. LITTLE INTEREST OR PLEASURE IN DOING THINGS: NOT AT ALL

## 2019-04-29 ASSESSMENT — LIFESTYLE VARIABLES: SUBSTANCE_ABUSE: 1

## 2019-04-29 NOTE — PROGRESS NOTES
Bedside report received by victoria Charles. Patient lying in bed watching TV at this time. POC discussed, verbalized understanding. No immediate concerns for patient at this time. All safety measures in place.

## 2019-04-29 NOTE — PROGRESS NOTES
Hospital Medicine Daily Progress Note    Date of Service  4/29/2019    Chief Complaint  64 y.o. male admitted 4/24/2019 with alcohol intoxication, fall with left chest pain    Hospital Course    61-year-old male admitted with acute alcohol intoxication post fall while sitting on toilet.  Complaints of left-sided chest pain.  Findings of A. fib RVR.  Reports routinely drinks Black Velvet 2 L bottle over couple days.  Diagnosed with left-sided rib fractures.    Interval Problem Update    A. fib rate controlled.  Still very weak-plan to mobilize, PT eval. weaning O2.  States he is very anxious.    Consultants/Specialty    Dr. Reyna , surgery    Code Status    Full code    Disposition    Plan PT/OT, wean O2    Review of Systems  Review of Systems   Constitutional: Negative for chills and fever.   HENT: Negative for congestion.    Eyes: Negative for discharge and redness.   Respiratory: Negative for cough, shortness of breath and stridor.    Cardiovascular: Positive for chest pain (left rib pain ). Negative for palpitations and leg swelling.   Gastrointestinal: Negative for abdominal pain, constipation, diarrhea and vomiting.   Genitourinary: Negative for flank pain and hematuria.   Musculoskeletal: Positive for falls. Negative for back pain, joint pain and neck pain.   Neurological: Positive for weakness (Generalized). Negative for tremors, sensory change, speech change and focal weakness.   Psychiatric/Behavioral: Positive for substance abuse. Negative for hallucinations.        Physical Exam  Temp:  [35.9 °C (96.7 °F)-36.7 °C (98 °F)] 36.1 °C (97 °F)  Pulse:  [65-83] 73  Resp:  [] 116  BP: (106-180)/() 151/98  SpO2:  [92 %-99 %] 97 %    Physical Exam   Constitutional: He is oriented to person, place, and time. No distress.   HENT:   Right Ear: External ear normal.   Left Ear: External ear normal.   Nose: Nose normal.   Left periorbital ecchymosis   Eyes: EOM are normal. No scleral icterus.   No purulent  drainage     Neck: Normal range of motion.   Cardiovascular:   No murmur heard.  Irregular, irregular   Pulmonary/Chest: No stridor. He has no wheezes. He has no rales. He exhibits tenderness (left chest ).   Diminished breath sounds at bases   Abdominal: Soft. Bowel sounds are normal. He exhibits no distension. There is no tenderness.   Obese   Musculoskeletal: He exhibits no edema or tenderness.   Generalized 4/5 strength   Neurological: He is alert and oriented to person, place, and time.   No focal weakness   Skin: Skin is warm and dry. He is not diaphoretic. No pallor.   Psychiatric: His mood appears anxious.   Vitals reviewed.      Fluids    Intake/Output Summary (Last 24 hours) at 04/29/19 1621  Last data filed at 04/29/19 1233   Gross per 24 hour   Intake              240 ml   Output              400 ml   Net             -160 ml       Laboratory      Recent Labs      04/28/19   0214  04/29/19   0236   SODIUM  132*  134*   POTASSIUM  4.1  4.6   CHLORIDE  97  97   CO2  29  29   GLUCOSE  138*  132*   BUN  7*  7*   CREATININE  0.69  0.53   CALCIUM  8.3*  8.9                   Imaging  EC-ECHOCARDIOGRAM COMPLETE W/O CONT   Final Result   CONCLUSIONS  Normal left ventricular size and systolic function.  Mild concentric left ventricular hypertrophy.  The aortic valve is sclerotic with good leaflet mobility.  Estimated right ventricular systolic pressure  is 50 mmHg.  No prior study is available for comparison.   DX-CHEST-LIMITED (1 VIEW)   Final Result      1.  Cardiomegaly.      2.  Multiple left rib fractures.      US-RUQ   Final Result      1.  Prior cholecystectomy. No evidence of biliary ductal dilatation.      2.  The liver is echogenic consistent with fatty change versus hepatocellular dysfunction.      CT-CHEST,ABDOMEN,PELVIS WITH   Final Result   Addendum 1 of 1   There are multiple old left rib fractures.   There are acute fractures of the left fifth, 6, seventh, ninth, and    possibly 10th ribs.   No  pulmonary contusion identified.      Final      No acute injury identified.   4 mm right lower lobe nodule containing punctate calcification most likely granuloma.   Hepatic steatosis.   Surgical absence gallbladder.   Lobulated kidneys no hydronephrosis.   No evidence of bowel obstruction. No free fluid.      CT-CSPINE WITHOUT PLUS RECONS   Final Result      No acute fracture. Degenerative changes.      CT-HEAD W/O   Final Result      1.  Cerebral atrophy.      2.  White matter lucencies most consistent with small vessel ischemic change versus demyelination or gliosis.      3.  Otherwise, Head CT without contrast with no acute findings. No evidence of acute cerebral hemorrhage or mass lesion.           Assessment/Plan  * AF (atrial fibrillation) ([facility])   Assessment & Plan    New diagnosis of alcohol intoxication.  Patient does not have sensation of palpitations , unknown duration.  Echo preserved LV function  A. fib RVR -improved--continue Lopressor  Benefits versus risk of anticoagulation were discussed with patient.  Patient situation complicated by frequent falls, active alcoholism, poor compliance resulting in significantly higher bleed risk.     Continue aspirin therapy  Outpatient referral to cardiology  Monitor telemetry       Acute hypoxemic respiratory failure (HCC)   Assessment & Plan    O2 support  Wean down O2  Encourage I-S  Control rib pain     Alcoholic intoxication without complication ([facility])   Assessment & Plan    Withdrawal symptoms resolving.  Scheduled Neurontin  Thiamine, folate, multivitamin  Diet as tolerated  Continue PT/OT  As needed Ativan for anxiety     Troponin level elevated   Assessment & Plan    Troponin indeterminate range-trending down.  Likely from A. fib RVR, demand.  Echo preserved LV function.  Telemetry  Follow clinically  Left chest pain is due to rib fractures.       Closed fracture of multiple ribs of left side with routine healing   Assessment & Plan    Uncontrolled left rib  pain .  Patient followed by trauma surgery - plan supportive treatment  MS Contin for pain control.  PRN oxycodone and IV morphine  encourage I-S  Mobilize with walker  O2 support as needed  PT/OT  Add Lidoderm patch and try to decrease reliance on opiates           Increased anion gap metabolic acidosis   Assessment & Plan    Likely etoh, starvation ketosis.  Resolved  Encourage intake.   Monitor CMP       Transaminitis   Assessment & Plan    High AST/ALT ratio, mild-likely alcoholic hepatitis.  Ultrasound findings of hepatocellular dysfunction  Monitor liver enzymes  Advised abstinence from alcohol     Conjunctivitis   Assessment & Plan    With crust and purulent drainage suspect bacterial-improved  Continue with Polytrim eyedrops-complete 5-day course  Visine for irritation dryness     Hypocalcemia   Assessment & Plan    Correcting  IV magnesium  Continue Ca carb.  Follow-up BMP       Hypomagnesemia   Assessment & Plan    Persistent  Continue oral magnesium.  Additional IV magnesium  Follow-up magnesium     Lung nodule   Assessment & Plan    4 mm right lower lobe nodule containing punctate calcification most likely granuloma.  Outpatient followup recommended.      Hypokalemia   Assessment & Plan    Corrected  Follow up electrolytes.      Essential hypertension   Assessment & Plan    Continue with beta-blocker  As needed clonidine for withdrawal benefits  Monitor BP         Type 2 diabetes mellitus with diabetic polyneuropathy, without long-term current use of insulin (HCC)   Assessment & Plan    With hyperglycemia, labile blood sugars  Insulin sliding scale as needed.  Continue home gabapentin  Diabetic diet  Continue metformin     Obesity- (present on admission)   Assessment & Plan    Weight loss counseling     PHILLY on CPAP   Assessment & Plan    Resume          VTE prophylaxis: SCDs    Discussed with case management plan of care.

## 2019-04-29 NOTE — PROGRESS NOTES
Monitor Summary: AFib/flutter converted to SR 1928.   SR 63-92. First degree HB, R BPAC, R PAC, R PVC, BBB, 1.4 SP  .24/.12/.38

## 2019-04-29 NOTE — RESPIRATORY CARE
COPD EDUCATION by COPD CLINICAL EDUCATOR  4/29/2019 at 7:00 AM by Mae Parr     Patient reviewed by COPD education team. Patient does not qualify for the COPD program.

## 2019-04-29 NOTE — PROGRESS NOTES
2 RN skin check complete with ALEXANDRE Rojas    · Devices in place NC  · Skin assessed under devices yes  · Confirmed wounds found on none  · New potential wounds noted on none  · Wound consult: no  · Wound reported and pictures uploaded: no    Ears red and blanching. Foam pads in place  Generalized dryness and scattered bruising particularly on upper ext.  L eye bruising  Panus discolored and moist. Intradry in place  Sacrum discolored and bruised  Bruising to L flank  Heels pink and blanching    · The following interventions in place: waffle mattress, turning encouraged, silicone NC, foam pads on ears, condom cath, incontinence care PRN

## 2019-04-29 NOTE — PROGRESS NOTES
2 RN skin check complete with ALEXANDRE Olguin     · Devices in place NC  · Skin assessed under devices yes  · Confirmed wounds found on none  · New potential wounds noted on none  · Wound consult: no  · Wound reported and pictures uploaded: no     Ears red and blanching. Foam pads in place  Generalized dryness and scattered bruising particularly on upper ext.  L eye bruising  Panus pink and moist. Intradry in place  Sacrum red, discolored, and bruised but blanching  Bruising to L flank  Heels pink and blanching     · The following interventions in place: waffle mattress, turning encouraged, silicone NC, foam pads on ears, condom cath, incontinence care PRN

## 2019-04-30 ENCOUNTER — APPOINTMENT (OUTPATIENT)
Dept: RADIOLOGY | Facility: MEDICAL CENTER | Age: 64
DRG: 896 | End: 2019-04-30
Attending: HOSPITALIST

## 2019-04-30 LAB
GLUCOSE BLD-MCNC: 104 MG/DL (ref 65–99)
GLUCOSE BLD-MCNC: 91 MG/DL (ref 65–99)
GLUCOSE BLD-MCNC: 95 MG/DL (ref 65–99)
GLUCOSE BLD-MCNC: 96 MG/DL (ref 65–99)

## 2019-04-30 PROCEDURE — 700102 HCHG RX REV CODE 250 W/ 637 OVERRIDE(OP): Performed by: HOSPITALIST

## 2019-04-30 PROCEDURE — 97530 THERAPEUTIC ACTIVITIES: CPT

## 2019-04-30 PROCEDURE — A9270 NON-COVERED ITEM OR SERVICE: HCPCS | Performed by: INTERNAL MEDICINE

## 2019-04-30 PROCEDURE — 700102 HCHG RX REV CODE 250 W/ 637 OVERRIDE(OP): Performed by: INTERNAL MEDICINE

## 2019-04-30 PROCEDURE — 82962 GLUCOSE BLOOD TEST: CPT | Mod: 91

## 2019-04-30 PROCEDURE — 700111 HCHG RX REV CODE 636 W/ 250 OVERRIDE (IP): Performed by: INTERNAL MEDICINE

## 2019-04-30 PROCEDURE — 99233 SBSQ HOSP IP/OBS HIGH 50: CPT | Performed by: INTERNAL MEDICINE

## 2019-04-30 PROCEDURE — A9270 NON-COVERED ITEM OR SERVICE: HCPCS | Performed by: HOSPITALIST

## 2019-04-30 PROCEDURE — 700101 HCHG RX REV CODE 250: Performed by: HOSPITALIST

## 2019-04-30 PROCEDURE — 71045 X-RAY EXAM CHEST 1 VIEW: CPT

## 2019-04-30 PROCEDURE — 770020 HCHG ROOM/CARE - TELE (206)

## 2019-04-30 RX ORDER — LISINOPRIL 5 MG/1
5 TABLET ORAL
Status: DISCONTINUED | OUTPATIENT
Start: 2019-04-30 | End: 2019-04-30

## 2019-04-30 RX ORDER — LISINOPRIL 5 MG/1
10 TABLET ORAL
Status: DISCONTINUED | OUTPATIENT
Start: 2019-05-01 | End: 2019-05-01

## 2019-04-30 RX ADMIN — SENNOSIDES,DOCUSATE SODIUM 2 TABLET: 8.6; 5 TABLET, FILM COATED ORAL at 05:22

## 2019-04-30 RX ADMIN — CALCIUM 500 MG: 500 TABLET ORAL at 12:04

## 2019-04-30 RX ADMIN — QUETIAPINE FUMARATE 100 MG: 25 TABLET ORAL at 20:24

## 2019-04-30 RX ADMIN — OXYCODONE HYDROCHLORIDE 10 MG: 5 TABLET ORAL at 14:23

## 2019-04-30 RX ADMIN — POLYMYXIN B SULFATE, TRIMETHOPRIM SULFATE 1 DROP: 10000; 1 SOLUTION/ DROPS OPHTHALMIC at 20:24

## 2019-04-30 RX ADMIN — METFORMIN HYDROCHLORIDE 1000 MG: 500 TABLET, FILM COATED ORAL at 17:28

## 2019-04-30 RX ADMIN — POLYMYXIN B SULFATE, TRIMETHOPRIM SULFATE 1 DROP: 10000; 1 SOLUTION/ DROPS OPHTHALMIC at 12:04

## 2019-04-30 RX ADMIN — POLYMYXIN B SULFATE, TRIMETHOPRIM SULFATE 1 DROP: 10000; 1 SOLUTION/ DROPS OPHTHALMIC at 17:28

## 2019-04-30 RX ADMIN — OXYCODONE HYDROCHLORIDE 10 MG: 5 TABLET ORAL at 02:03

## 2019-04-30 RX ADMIN — ACETAMINOPHEN 650 MG: 325 TABLET, FILM COATED ORAL at 10:20

## 2019-04-30 RX ADMIN — ASPIRIN 325 MG: 325 TABLET, FILM COATED ORAL at 05:21

## 2019-04-30 RX ADMIN — METOPROLOL TARTRATE 100 MG: 50 TABLET ORAL at 17:28

## 2019-04-30 RX ADMIN — OXYCODONE HYDROCHLORIDE 10 MG: 5 TABLET ORAL at 10:20

## 2019-04-30 RX ADMIN — GABAPENTIN 900 MG: 300 CAPSULE ORAL at 12:04

## 2019-04-30 RX ADMIN — DULOXETINE HYDROCHLORIDE 60 MG: 60 CAPSULE, DELAYED RELEASE ORAL at 05:21

## 2019-04-30 RX ADMIN — GABAPENTIN 900 MG: 300 CAPSULE ORAL at 05:21

## 2019-04-30 RX ADMIN — OXYCODONE HYDROCHLORIDE 10 MG: 5 TABLET ORAL at 20:29

## 2019-04-30 RX ADMIN — LIDOCAINE 1 PATCH: 50 PATCH TOPICAL at 17:28

## 2019-04-30 RX ADMIN — Medication 100 MG: at 05:21

## 2019-04-30 RX ADMIN — POLYMYXIN B SULFATE, TRIMETHOPRIM SULFATE 1 DROP: 10000; 1 SOLUTION/ DROPS OPHTHALMIC at 10:16

## 2019-04-30 RX ADMIN — METFORMIN HYDROCHLORIDE 1000 MG: 500 TABLET, FILM COATED ORAL at 10:14

## 2019-04-30 RX ADMIN — THERA TABS 1 TABLET: TAB at 05:22

## 2019-04-30 RX ADMIN — CALCIUM 500 MG: 500 TABLET ORAL at 17:28

## 2019-04-30 RX ADMIN — METOPROLOL TARTRATE 100 MG: 50 TABLET ORAL at 05:22

## 2019-04-30 RX ADMIN — CALCIUM 500 MG: 500 TABLET ORAL at 05:22

## 2019-04-30 RX ADMIN — FOLIC ACID 1 MG: 1 TABLET ORAL at 05:21

## 2019-04-30 RX ADMIN — MAGNESIUM OXIDE TAB 400 MG (241.3 MG ELEMENTAL MG) 400 MG: 400 (241.3 MG) TAB at 17:28

## 2019-04-30 RX ADMIN — LISINOPRIL 5 MG: 5 TABLET ORAL at 12:04

## 2019-04-30 RX ADMIN — MORPHINE SULFATE 30 MG: 30 TABLET, FILM COATED, EXTENDED RELEASE ORAL at 17:28

## 2019-04-30 RX ADMIN — MAGNESIUM OXIDE TAB 400 MG (241.3 MG ELEMENTAL MG) 400 MG: 400 (241.3 MG) TAB at 05:21

## 2019-04-30 RX ADMIN — GABAPENTIN 900 MG: 300 CAPSULE ORAL at 17:28

## 2019-04-30 RX ADMIN — MORPHINE SULFATE 30 MG: 30 TABLET, FILM COATED, EXTENDED RELEASE ORAL at 05:21

## 2019-04-30 RX ADMIN — LORAZEPAM 1 MG: 1 TABLET ORAL at 10:28

## 2019-04-30 RX ADMIN — ENOXAPARIN SODIUM 40 MG: 100 INJECTION SUBCUTANEOUS at 05:22

## 2019-04-30 ASSESSMENT — ENCOUNTER SYMPTOMS
FLANK PAIN: 0
SHORTNESS OF BREATH: 0
DIAPHORESIS: 0
CHILLS: 0
WEAKNESS: 1
HALLUCINATIONS: 0
TREMORS: 0
ABDOMINAL PAIN: 0
FOCAL WEAKNESS: 0
BLURRED VISION: 0
DEPRESSION: 0
PALPITATIONS: 0
DOUBLE VISION: 0
MYALGIAS: 0
DIZZINESS: 0
FALLS: 1
FEVER: 0
NAUSEA: 0
VOMITING: 0
CONSTIPATION: 0

## 2019-04-30 ASSESSMENT — PATIENT HEALTH QUESTIONNAIRE - PHQ9
SUM OF ALL RESPONSES TO PHQ9 QUESTIONS 1 AND 2: 0
1. LITTLE INTEREST OR PLEASURE IN DOING THINGS: NOT AT ALL
2. FEELING DOWN, DEPRESSED, IRRITABLE, OR HOPELESS: NOT AT ALL

## 2019-04-30 ASSESSMENT — COGNITIVE AND FUNCTIONAL STATUS - GENERAL
SUGGESTED CMS G CODE MODIFIER MOBILITY: CI
CLIMB 3 TO 5 STEPS WITH RAILING: A LITTLE
MOBILITY SCORE: 23

## 2019-04-30 ASSESSMENT — LIFESTYLE VARIABLES: SUBSTANCE_ABUSE: 1

## 2019-04-30 ASSESSMENT — GAIT ASSESSMENTS
GAIT LEVEL OF ASSIST: CONTACT GUARD ASSIST
DISTANCE (FEET): 300

## 2019-04-30 NOTE — THERAPY
"Physical Therapy Treatment completed.   Bed Mobility:  Supine to Sit: Supervised  Transfers: Sit to Stand: Stand By Assist  Gait: Level Of Assist: Contact Guard Assist with Railing  (pt declined AD; stated he wanted to attempt gait without)     Plan of Care: Will benefit from Physical Therapy 3 times per week  Discharge Recommendations: Equipment: Pt reporting he has FWW at home if need be. Declined SPC. . Post-acute therapy Discharge to home with home health for additional skilled therapy services.     Pt has progressed his gait distance and was able to complete x300' with CGA for stability and railing for stability. Discussed AD options with pt; PT recommended walking stick if pt feels strongly that he does not wish to utilize FWW. Pt in agreement that he will \"consider it\". Sp02 at 93% on 2L. HR 84bpm during gait. Should he remain here, PT will follow for continuation of mobility progression. Anticipate pt to be functionally capable of return home with HHPT services upon DC.     See \"Rehab Therapy-Acute\" Patient Summary Report for complete documentation.       "

## 2019-04-30 NOTE — PROGRESS NOTES
Chart Check Complete    Monitor Summary:    Rhythm- SR   Rate- 64-78  Ectopy- Bpac,Pac  Measurements- 0.24/0.08/0.40

## 2019-05-01 LAB
GLUCOSE BLD-MCNC: 100 MG/DL (ref 65–99)
GLUCOSE BLD-MCNC: 109 MG/DL (ref 65–99)
GLUCOSE BLD-MCNC: 99 MG/DL (ref 65–99)

## 2019-05-01 PROCEDURE — A9270 NON-COVERED ITEM OR SERVICE: HCPCS | Performed by: INTERNAL MEDICINE

## 2019-05-01 PROCEDURE — 700102 HCHG RX REV CODE 250 W/ 637 OVERRIDE(OP): Performed by: HOSPITALIST

## 2019-05-01 PROCEDURE — 700111 HCHG RX REV CODE 636 W/ 250 OVERRIDE (IP): Performed by: INTERNAL MEDICINE

## 2019-05-01 PROCEDURE — 97535 SELF CARE MNGMENT TRAINING: CPT

## 2019-05-01 PROCEDURE — 99232 SBSQ HOSP IP/OBS MODERATE 35: CPT | Performed by: INTERNAL MEDICINE

## 2019-05-01 PROCEDURE — A9270 NON-COVERED ITEM OR SERVICE: HCPCS | Performed by: HOSPITALIST

## 2019-05-01 PROCEDURE — 82962 GLUCOSE BLOOD TEST: CPT | Mod: 91

## 2019-05-01 PROCEDURE — 700111 HCHG RX REV CODE 636 W/ 250 OVERRIDE (IP): Performed by: HOSPITALIST

## 2019-05-01 PROCEDURE — 770020 HCHG ROOM/CARE - TELE (206)

## 2019-05-01 PROCEDURE — 97530 THERAPEUTIC ACTIVITIES: CPT

## 2019-05-01 PROCEDURE — 700102 HCHG RX REV CODE 250 W/ 637 OVERRIDE(OP): Performed by: INTERNAL MEDICINE

## 2019-05-01 RX ORDER — ALLOPURINOL 100 MG/1
200 TABLET ORAL DAILY
Status: DISCONTINUED | OUTPATIENT
Start: 2019-05-01 | End: 2019-05-03 | Stop reason: HOSPADM

## 2019-05-01 RX ORDER — LISINOPRIL 20 MG/1
20 TABLET ORAL
Status: DISCONTINUED | OUTPATIENT
Start: 2019-05-02 | End: 2019-05-03 | Stop reason: HOSPADM

## 2019-05-01 RX ADMIN — OXYCODONE HYDROCHLORIDE 10 MG: 5 TABLET ORAL at 21:11

## 2019-05-01 RX ADMIN — CALCIUM 500 MG: 500 TABLET ORAL at 08:29

## 2019-05-01 RX ADMIN — SENNOSIDES,DOCUSATE SODIUM 2 TABLET: 8.6; 5 TABLET, FILM COATED ORAL at 04:51

## 2019-05-01 RX ADMIN — POLYMYXIN B SULFATE, TRIMETHOPRIM SULFATE 1 DROP: 10000; 1 SOLUTION/ DROPS OPHTHALMIC at 21:11

## 2019-05-01 RX ADMIN — MORPHINE SULFATE 30 MG: 30 TABLET, FILM COATED, EXTENDED RELEASE ORAL at 04:50

## 2019-05-01 RX ADMIN — OXYCODONE HYDROCHLORIDE 10 MG: 5 TABLET ORAL at 13:19

## 2019-05-01 RX ADMIN — MAGNESIUM OXIDE TAB 400 MG (241.3 MG ELEMENTAL MG) 400 MG: 400 (241.3 MG) TAB at 04:51

## 2019-05-01 RX ADMIN — THERA TABS 1 TABLET: TAB at 04:51

## 2019-05-01 RX ADMIN — QUETIAPINE FUMARATE 100 MG: 25 TABLET ORAL at 21:11

## 2019-05-01 RX ADMIN — FOLIC ACID 1 MG: 1 TABLET ORAL at 04:51

## 2019-05-01 RX ADMIN — LORAZEPAM 1 MG: 1 TABLET ORAL at 21:10

## 2019-05-01 RX ADMIN — METOPROLOL TARTRATE 100 MG: 50 TABLET ORAL at 04:51

## 2019-05-01 RX ADMIN — METFORMIN HYDROCHLORIDE 1000 MG: 500 TABLET, FILM COATED ORAL at 16:55

## 2019-05-01 RX ADMIN — MORPHINE SULFATE 30 MG: 30 TABLET, FILM COATED, EXTENDED RELEASE ORAL at 16:54

## 2019-05-01 RX ADMIN — ASPIRIN 325 MG: 325 TABLET, FILM COATED ORAL at 04:52

## 2019-05-01 RX ADMIN — CALCIUM 500 MG: 500 TABLET ORAL at 11:56

## 2019-05-01 RX ADMIN — ALLOPURINOL 200 MG: 100 TABLET ORAL at 11:56

## 2019-05-01 RX ADMIN — METOPROLOL TARTRATE 100 MG: 50 TABLET ORAL at 16:54

## 2019-05-01 RX ADMIN — POLYMYXIN B SULFATE, TRIMETHOPRIM SULFATE 1 DROP: 10000; 1 SOLUTION/ DROPS OPHTHALMIC at 08:30

## 2019-05-01 RX ADMIN — GABAPENTIN 900 MG: 300 CAPSULE ORAL at 04:51

## 2019-05-01 RX ADMIN — POLYMYXIN B SULFATE, TRIMETHOPRIM SULFATE 1 DROP: 10000; 1 SOLUTION/ DROPS OPHTHALMIC at 13:19

## 2019-05-01 RX ADMIN — GABAPENTIN 900 MG: 300 CAPSULE ORAL at 16:53

## 2019-05-01 RX ADMIN — GABAPENTIN 900 MG: 300 CAPSULE ORAL at 11:56

## 2019-05-01 RX ADMIN — SENNOSIDES,DOCUSATE SODIUM 2 TABLET: 8.6; 5 TABLET, FILM COATED ORAL at 16:55

## 2019-05-01 RX ADMIN — OXYCODONE HYDROCHLORIDE 10 MG: 5 TABLET ORAL at 03:10

## 2019-05-01 RX ADMIN — LISINOPRIL 10 MG: 5 TABLET ORAL at 04:50

## 2019-05-01 RX ADMIN — CALCIUM 500 MG: 500 TABLET ORAL at 16:55

## 2019-05-01 RX ADMIN — MORPHINE SULFATE 2 MG: 4 INJECTION INTRAVENOUS at 08:26

## 2019-05-01 RX ADMIN — POLYMYXIN B SULFATE, TRIMETHOPRIM SULFATE 1 DROP: 10000; 1 SOLUTION/ DROPS OPHTHALMIC at 16:56

## 2019-05-01 RX ADMIN — DULOXETINE HYDROCHLORIDE 60 MG: 60 CAPSULE, DELAYED RELEASE ORAL at 04:52

## 2019-05-01 RX ADMIN — METFORMIN HYDROCHLORIDE 1000 MG: 500 TABLET, FILM COATED ORAL at 08:29

## 2019-05-01 RX ADMIN — MAGNESIUM OXIDE TAB 400 MG (241.3 MG ELEMENTAL MG) 400 MG: 400 (241.3 MG) TAB at 16:55

## 2019-05-01 RX ADMIN — LORAZEPAM 1 MG: 1 TABLET ORAL at 04:51

## 2019-05-01 RX ADMIN — Medication 100 MG: at 04:51

## 2019-05-01 RX ADMIN — ENOXAPARIN SODIUM 40 MG: 100 INJECTION SUBCUTANEOUS at 04:50

## 2019-05-01 ASSESSMENT — LIFESTYLE VARIABLES: SUBSTANCE_ABUSE: 1

## 2019-05-01 ASSESSMENT — ENCOUNTER SYMPTOMS
ABDOMINAL PAIN: 0
CHILLS: 0
NAUSEA: 0
SHORTNESS OF BREATH: 0
HALLUCINATIONS: 0
FLANK PAIN: 0
MYALGIAS: 0
PALPITATIONS: 0
FALLS: 1
WEAKNESS: 1
CONSTIPATION: 0
TREMORS: 0
HEADACHES: 0
FEVER: 0

## 2019-05-01 ASSESSMENT — COGNITIVE AND FUNCTIONAL STATUS - GENERAL
DAILY ACTIVITIY SCORE: 22
SUGGESTED CMS G CODE MODIFIER DAILY ACTIVITY: CJ
DRESSING REGULAR LOWER BODY CLOTHING: A LITTLE
HELP NEEDED FOR BATHING: A LITTLE

## 2019-05-01 NOTE — DISCHARGE PLANNING
Anticipated Discharge Disposition: home with HH    Action: has order for HH. Discussed with pt and spouse, referred to [facility]    Barriers to Discharge: pending med clear and HH accept    Plan: home

## 2019-05-01 NOTE — PROGRESS NOTES
Hospital Medicine Daily Progress Note    Date of Service  4/30/2019    Chief Complaint  64 y.o. male admitted 4/24/2019 with alcohol intoxication, fall with left chest pain    Hospital Course    61-year-old male admitted with acute alcohol intoxication post fall while sitting on toilet.  Complaints of left-sided chest pain.  Findings of A. fib RVR.  Reports routinely drinks Black Velvet 2 L bottle over couple days.  Diagnosed with left-sided rib fractures.    Interval Problem Update    Improving weakness  Ambulating with minimal assistance  Slightly unsteady gait  Negative tremors  Denies pain  Interested in rehab participation    Consultants/Specialty    Dr. Reyna , surgery    Code Status    Full code    Disposition    Plan PT/OT, wean O2  Lives with his wife, will consider discharge home versus skilled nursing facility placement  Encourage activity    Review of Systems  Review of Systems   Constitutional: Negative for chills, diaphoresis, fever and malaise/fatigue.   HENT: Negative for congestion and hearing loss.    Eyes: Negative for blurred vision and double vision.   Respiratory: Negative for shortness of breath.    Cardiovascular: Negative for chest pain (left rib pain ), palpitations and leg swelling.   Gastrointestinal: Negative for abdominal pain, constipation, nausea and vomiting.   Genitourinary: Negative for flank pain and hematuria.   Musculoskeletal: Positive for falls. Negative for joint pain and myalgias.   Neurological: Positive for weakness (Generalized). Negative for dizziness, tremors and focal weakness.   Psychiatric/Behavioral: Positive for substance abuse. Negative for depression and hallucinations.        Physical Exam  Temp:  [35.9 °C (96.7 °F)-36.8 °C (98.2 °F)] 36.2 °C (97.2 °F)  Pulse:  [62-87] 87  Resp:  [16] 16  BP: (145-162)/() 145/94  SpO2:  [85 %-96 %] 96 %    Physical Exam   Constitutional: He is oriented to person, place, and time. No distress.   Morbid obesity   HENT:    Right Ear: External ear normal.   Left Ear: External ear normal.   Nose: Nose normal.   Left periorbital ecchymosis   Eyes: EOM are normal. No scleral icterus.   No purulent drainage  Periorbital ecchymosis     Neck: Normal range of motion. No JVD present.   Cardiovascular: Normal rate and intact distal pulses.    No murmur heard.  Irregular, irregular   Pulmonary/Chest: No stridor. He has no wheezes. He has no rales. He exhibits tenderness (left chest ).   Diminished breath sounds at bases   Abdominal: Soft. Bowel sounds are normal. He exhibits no distension. There is no tenderness.   Obese   Musculoskeletal: He exhibits no edema or tenderness.   Generalized 4/5 strength   Neurological: He is alert and oriented to person, place, and time. No cranial nerve deficit. Coordination normal.   No focal weakness   Skin: Skin is warm and dry. He is not diaphoretic. No pallor.   Psychiatric: His behavior is normal. Judgment and thought content normal. His mood appears anxious.   Nursing note and vitals reviewed.      Fluids    Intake/Output Summary (Last 24 hours) at 04/30/19 1803  Last data filed at 04/29/19 2010   Gross per 24 hour   Intake                0 ml   Output              100 ml   Net             -100 ml       Laboratory      Recent Labs      04/28/19   0214  04/29/19   0236   SODIUM  132*  134*   POTASSIUM  4.1  4.6   CHLORIDE  97  97   CO2  29  29   GLUCOSE  138*  132*   BUN  7*  7*   CREATININE  0.69  0.53   CALCIUM  8.3*  8.9                   Imaging  EC-ECHOCARDIOGRAM COMPLETE W/O CONT   Final Result   CONCLUSIONS  Normal left ventricular size and systolic function.  Mild concentric left ventricular hypertrophy.  The aortic valve is sclerotic with good leaflet mobility.  Estimated right ventricular systolic pressure  is 50 mmHg.  No prior study is available for comparison.   DX-CHEST-LIMITED (1 VIEW)   Final Result      1.  Cardiomegaly.      2.  Multiple left rib fractures.      US-RUQ   Final Result       1.  Prior cholecystectomy. No evidence of biliary ductal dilatation.      2.  The liver is echogenic consistent with fatty change versus hepatocellular dysfunction.      CT-CHEST,ABDOMEN,PELVIS WITH   Final Result   Addendum 1 of 1   There are multiple old left rib fractures.   There are acute fractures of the left fifth, 6, seventh, ninth, and    possibly 10th ribs.   No pulmonary contusion identified.      Final      No acute injury identified.   4 mm right lower lobe nodule containing punctate calcification most likely granuloma.   Hepatic steatosis.   Surgical absence gallbladder.   Lobulated kidneys no hydronephrosis.   No evidence of bowel obstruction. No free fluid.      CT-CSPINE WITHOUT PLUS RECONS   Final Result      No acute fracture. Degenerative changes.      CT-HEAD W/O   Final Result      1.  Cerebral atrophy.      2.  White matter lucencies most consistent with small vessel ischemic change versus demyelination or gliosis.      3.  Otherwise, Head CT without contrast with no acute findings. No evidence of acute cerebral hemorrhage or mass lesion.           Assessment/Plan  * AF (atrial fibrillation) (HCC)   Assessment & Plan    New diagnosis of alcohol intoxication.  Patient does not have sensation of palpitations , unknown duration.  Echo preserved LV function  A. fib RVR -improved--continue Lopressor  Benefits versus risk of anticoagulation were discussed with patient.  Patient situation complicated by frequent falls, active alcoholism, poor compliance resulting in significantly higher bleed risk.     Continue aspirin therapy  Outpatient referral to cardiology  Monitor telemetry       Acute hypoxemic respiratory failure (HCC)   Assessment & Plan    O2 support  Wean down O2  Encourage I-S  Control rib pain     Alcoholic intoxication without complication (HCC)   Assessment & Plan    Withdrawal symptoms resolving.  Scheduled Neurontin  Thiamine, folate, multivitamin  Diet as tolerated  Continue  PT/OT  As needed Ativan for anxiety    4/30 not in withdrawal  States that he would like to participate in rehab programs  [facility]     Troponin level elevated   Assessment & Plan    Troponin indeterminate range-trending down.  Likely from A. fib RVR, demand.  Echo preserved LV function.  Telemetry  Follow clinically  Left chest pain is due to rib fractures.       Closed fracture of multiple ribs of left side with routine healing   Assessment & Plan    Uncontrolled left rib pain .  Patient followed by trauma surgery - plan supportive treatment  MS Contin for pain control.  PRN oxycodone and IV morphine  encourage I-S  Mobilize with walker  O2 support as needed  PT/OT  Add Lidoderm patch and try to decrease reliance on opiates           Increased anion gap metabolic acidosis   Assessment & Plan    Likely etoh, starvation ketosis.  Resolved  Encourage intake.   Monitor CMP       Transaminitis   Assessment & Plan    High AST/ALT ratio, mild-likely alcoholic hepatitis.  Ultrasound findings of hepatocellular dysfunction  Monitor liver enzymes  Advised abstinence from alcohol     Conjunctivitis   Assessment & Plan    With crust and purulent drainage suspect bacterial-improved  Continue with Polytrim eyedrops-complete 5-day course  Visine for irritation dryness     Hypocalcemia   Assessment & Plan    Correcting  IV magnesium  Continue Ca carb.  Follow-up BMP       Hypomagnesemia   Assessment & Plan    Persistent  Continue oral magnesium.  Additional IV magnesium  Follow-up magnesium     Lung nodule   Assessment & Plan    4 mm right lower lobe nodule containing punctate calcification most likely granuloma.  Outpatient followup recommended.      Hypokalemia   Assessment & Plan    Corrected  Follow up electrolytes.      Essential hypertension   Assessment & Plan    Continue with beta-blocker  As needed clonidine for withdrawal benefits  Monitor BP    Add lisinopril  F/u echo         Type  2 diabetes mellitus with diabetic polyneuropathy, without long-term current use of insulin (HCC)   Assessment & Plan    With hyperglycemia, labile blood sugars  Insulin sliding scale as needed.  Continue home gabapentin  Diabetic diet  Continue metformin     Obesity- (present on admission)   Assessment & Plan    Weight loss counseling     PHILLY on CPAP   Assessment & Plan    Resume          VTE prophylaxis: SCDs    Discussed with case management plan of care.

## 2019-05-01 NOTE — PROGRESS NOTES
Chart Check Complete    Monitor Summary:    Rhythm- SR 1st degree BBB   Rate- 65-89  Ectopy- BPAC  Measurements- 0.24/0.12/0.44

## 2019-05-01 NOTE — FACE TO FACE
Face to Face Supporting Documentation - Home Health    The encounter with this patient was in whole or in part the primary reason for home health admission.    Date of encounter:   Patient:                    MRN:                       YOB: 2018  Jarek Gonzalez  [id number]  1955     Home health to see patient for:  Skilled Nursing care for assessment, interventions & education, Physical Therapy evaluation and treatment and Occupational therapy evaluation and treatment    Skilled need for:  Exacerbation of Chronic Disease State Alcohol abuse, and atrial fibrillation    Skilled nursing interventions to include:  Comment: pt/ot    Homebound status evidenced by:  Need the aid of supportive devices such as crutches, canes, wheelchairs or walkers or Needs the assistance of another person in order to leave the home. Leaving home requires a considerable and taxing effort. There is a normal inability to leave the home.    Community Physician to provide follow up care: Jennifer Hart M.D.     Optional Interventions? No      I certify the face to face encounter for this home health care referral meets the CMS requirements and the encounter/clinical assessment with the patient was, in whole, or in part, for the medical condition(s) listed above, which is the primary reason for home health care. Based on my clinical findings: the service(s) are medically necessary, support the need for home health care, and the homebound criteria are met.  I certify that this patient has had a face to face encounter by myself.  Lizett Cortes D.O. - NPI: [id number]

## 2019-05-01 NOTE — THERAPY
"Occupational Therapy Treatment completed with focus on ADLs, ADL transfers and patient education.  Functional Status: Supine > EOB supv, transfers without AD supv, standing grooming supv  Plan of Care: Will benefit from Occupational Therapy 3 times per week  Discharge Recommendations:  Equipment Shower Chair (spouse to obtain). Post-acute therapy Discharge to home with outpatient or home health for additional skilled therapy services.    See \"Rehab Therapy-Acute\" Patient Summary Report for complete documentation.     Pt seen for OT tx. Pt completed bed mobility, transfers without AD (including toilet), standing oral care with no more than supv. Declined need to void or have BM. Pt demos improved standing balance this session. Spouse present and reports she plans to obtain shower chair and versa frame for over toilet. Educated pt and spouse on safe management of oxygen line in home for trip & fall prevention. Pt would benefit from continued OT to progress safety with standing ADL, management of O2 line and train on fall reduction strategies. Recommend DC home with HH.   "

## 2019-05-01 NOTE — PROGRESS NOTES
Hospital Medicine Daily Progress Note    Date of Service  5/1/2019    Chief Complaint  64 y.o. male admitted 4/24/2019 with alcohol intoxication, fall with left chest pain    Hospital Course    61-year-old male admitted with acute alcohol intoxication post fall while sitting on toilet.  Complaints of left-sided chest pain.  Findings of A. fib RVR.  Reports routinely drinks Black Velvet 2 L bottle over couple days.  Diagnosed with left-sided rib fractures.    Interval Problem Update  Tired today, no complaints      Consultants/Specialty    Dr. Reyna , surgery    Code Status    Full code    Disposition    Plan PT/OT, wean O2  Lives with his wife, will consider discharge home versus skilled nursing facility placement  Encourage activity    Review of Systems  Review of Systems   Constitutional: Negative for chills and fever.   HENT: Negative for congestion.    Respiratory: Negative for shortness of breath.    Cardiovascular: Negative for chest pain (left rib pain ), palpitations and leg swelling.   Gastrointestinal: Negative for abdominal pain, constipation and nausea.   Genitourinary: Negative for flank pain and hematuria.   Musculoskeletal: Positive for falls. Negative for myalgias.   Neurological: Positive for weakness (Generalized). Negative for tremors and headaches.   Psychiatric/Behavioral: Positive for substance abuse. Negative for hallucinations.        Physical Exam  Temp:  [35.9 °C (96.6 °F)-36.9 °C (98.5 °F)] 36.1 °C (97 °F)  Pulse:  [67-87] 74  Resp:  [16-17] 16  BP: (133-180)/() 161/102  SpO2:  [95 %-99 %] 99 %    Physical Exam   Constitutional: He is oriented to person, place, and time. No distress.   Morbid obesity   HENT:   Head: Normocephalic and atraumatic.   Mouth/Throat: No oropharyngeal exudate.   Left periorbital ecchymosis   Eyes: Pupils are equal, round, and reactive to light. EOM are normal.   No purulent drainage  Periorbital ecchymosis     Neck: Normal range of motion. No JVD present.    Cardiovascular: Normal rate and intact distal pulses.    No murmur heard.  Irregular, irregular   Pulmonary/Chest: Effort normal. No respiratory distress. He has no wheezes. He has no rales. He exhibits no tenderness (left chest ).   Diminished breath sounds at bases   Abdominal: Soft. Bowel sounds are normal. He exhibits no distension and no mass.   Obese   Musculoskeletal: He exhibits no edema or tenderness.   Generalized 4/5 strength   Neurological: He is alert and oriented to person, place, and time. No cranial nerve deficit. Coordination normal.   No focal weakness   Skin: Skin is warm and dry. He is not diaphoretic.   Psychiatric: His behavior is normal. Judgment and thought content normal. His mood appears anxious.   Nursing note and vitals reviewed.      Fluids    Intake/Output Summary (Last 24 hours) at 05/01/19 1559  Last data filed at 05/01/19 0353   Gross per 24 hour   Intake                0 ml   Output              200 ml   Net             -200 ml       Laboratory      Recent Labs      04/29/19   0236   SODIUM  134*   POTASSIUM  4.6   CHLORIDE  97   CO2  29   GLUCOSE  132*   BUN  7*   CREATININE  0.53   CALCIUM  8.9                   Imaging  EC-ECHOCARDIOGRAM COMPLETE W/O CONT   Final Result   CONCLUSIONS  Normal left ventricular size and systolic function.  Mild concentric left ventricular hypertrophy.  The aortic valve is sclerotic with good leaflet mobility.  Estimated right ventricular systolic pressure  is 50 mmHg.  No prior study is available for comparison.   DX-CHEST-LIMITED (1 VIEW)   Final Result      1.  Cardiomegaly.      2.  Multiple left rib fractures.      US-RUQ   Final Result      1.  Prior cholecystectomy. No evidence of biliary ductal dilatation.      2.  The liver is echogenic consistent with fatty change versus hepatocellular dysfunction.      CT-CHEST,ABDOMEN,PELVIS WITH   Final Result   Addendum 1 of 1   There are multiple old left rib fractures.   There are acute fractures of  the left fifth, 6, seventh, ninth, and    possibly 10th ribs.   No pulmonary contusion identified.      Final      No acute injury identified.   4 mm right lower lobe nodule containing punctate calcification most likely granuloma.   Hepatic steatosis.   Surgical absence gallbladder.   Lobulated kidneys no hydronephrosis.   No evidence of bowel obstruction. No free fluid.      CT-CSPINE WITHOUT PLUS RECONS   Final Result      No acute fracture. Degenerative changes.      CT-HEAD W/O   Final Result      1.  Cerebral atrophy.      2.  White matter lucencies most consistent with small vessel ischemic change versus demyelination or gliosis.      3.  Otherwise, Head CT without contrast with no acute findings. No evidence of acute cerebral hemorrhage or mass lesion.           Assessment/Plan  * AF (atrial fibrillation) ([facility])   Assessment & Plan    New diagnosis of alcohol intoxication.  Patient does not have sensation of palpitations , unknown duration.  Echo preserved LV function  A. fib RVR -improved--continue Lopressor  Benefits versus risk of anticoagulation were discussed with patient.  Patient situation complicated by frequent falls, active alcoholism, poor compliance resulting in significantly higher bleed risk.     Continue aspirin therapy  Outpatient referral to cardiology  Monitor telemetry       Acute hypoxemic respiratory failure (HCC)   Assessment & Plan    O2 support  Wean down O2  Encourage I-S  Control rib pain     Alcoholic intoxication without complication (HCC)   Assessment & Plan    Withdrawal symptoms resolving.  Scheduled Neurontin  Thiamine, folate, multivitamin  Diet as tolerated  Continue PT/OT  As needed Ativan for anxiety    4/30 not in withdrawal  States that he would like to participate in rehab programs  [facility]     Troponin level elevated   Assessment & Plan    Troponin indeterminate range-trending down.  Likely from A. fib RVR, demand.  Echo preserved  LV function.  Telemetry  Follow clinically  Left chest pain is due to rib fractures.       Closed fracture of multiple ribs of left side with routine healing   Assessment & Plan    Uncontrolled left rib pain .  Patient followed by trauma surgery - plan supportive treatment  MS Contin for pain control.  PRN oxycodone and IV morphine  encourage I-S  Mobilize with walker  O2 support as needed  PT/OT  Add Lidoderm patch and try to decrease reliance on opiates           Increased anion gap metabolic acidosis   Assessment & Plan    Likely etoh, starvation ketosis.  Resolved  Encourage intake.   Monitor CMP       Transaminitis   Assessment & Plan    High AST/ALT ratio, mild-likely alcoholic hepatitis.  Ultrasound findings of hepatocellular dysfunction  Monitor liver enzymes  Advised abstinence from alcohol     Conjunctivitis   Assessment & Plan    With crust and purulent drainage suspect bacterial-improved  Continue with Polytrim eyedrops-complete 5-day course  Visine for irritation dryness     Hypocalcemia   Assessment & Plan    Correcting  IV magnesium  Continue Ca carb.  Follow-up BMP       Hypomagnesemia   Assessment & Plan    Persistent  Continue oral magnesium.  Additional IV magnesium  Follow-up magnesium     Lung nodule   Assessment & Plan    4 mm right lower lobe nodule containing punctate calcification most likely granuloma.  Outpatient followup recommended.      Hypokalemia   Assessment & Plan    Corrected  Follow up electrolytes.      Essential hypertension   Assessment & Plan    Continue with beta-blocker  As needed clonidine for withdrawal benefits  Monitor BP    lisinopril, adjust  -echo EF of 70 %         Type 2 diabetes mellitus with diabetic polyneuropathy, without long-term current use of insulin (HCC)   Assessment & Plan    With hyperglycemia, labile blood sugars  Insulin sliding scale as needed.  Continue home gabapentin  Diabetic diet  Continue metformin     Obesity- (present on admission)    Assessment & Plan    Weight loss counseling     PHILLY on CPAP   Assessment & Plan    Resume          VTE prophylaxis: SCDs    Discussed with case management plan of care.

## 2019-05-02 ENCOUNTER — PATIENT OUTREACH (OUTPATIENT)
Dept: HEALTH INFORMATION MANAGEMENT | Facility: OTHER | Age: 64
End: 2019-05-02

## 2019-05-02 LAB
GLUCOSE BLD-MCNC: 102 MG/DL (ref 65–99)
GLUCOSE BLD-MCNC: 107 MG/DL (ref 65–99)
GLUCOSE BLD-MCNC: 120 MG/DL (ref 65–99)
GLUCOSE BLD-MCNC: 79 MG/DL (ref 65–99)

## 2019-05-02 PROCEDURE — 700111 HCHG RX REV CODE 636 W/ 250 OVERRIDE (IP): Performed by: INTERNAL MEDICINE

## 2019-05-02 PROCEDURE — 82962 GLUCOSE BLOOD TEST: CPT | Mod: 91

## 2019-05-02 PROCEDURE — A9270 NON-COVERED ITEM OR SERVICE: HCPCS | Performed by: INTERNAL MEDICINE

## 2019-05-02 PROCEDURE — 700102 HCHG RX REV CODE 250 W/ 637 OVERRIDE(OP): Performed by: INTERNAL MEDICINE

## 2019-05-02 PROCEDURE — 770020 HCHG ROOM/CARE - TELE (206)

## 2019-05-02 PROCEDURE — 99232 SBSQ HOSP IP/OBS MODERATE 35: CPT | Performed by: INTERNAL MEDICINE

## 2019-05-02 PROCEDURE — 700102 HCHG RX REV CODE 250 W/ 637 OVERRIDE(OP): Performed by: HOSPITALIST

## 2019-05-02 PROCEDURE — A9270 NON-COVERED ITEM OR SERVICE: HCPCS | Performed by: HOSPITALIST

## 2019-05-02 RX ORDER — IBUPROFEN 600 MG/1
600 TABLET ORAL EVERY 6 HOURS PRN
Qty: 30 TAB | Refills: 0 | Status: SHIPPED | OUTPATIENT
Start: 2019-05-02 | End: 2021-02-12

## 2019-05-02 RX ORDER — IBUPROFEN 200 MG
500 CAPSULE ORAL
Qty: 30 TAB | Refills: 1 | Status: SHIPPED | OUTPATIENT
Start: 2019-05-02 | End: 2023-07-19

## 2019-05-02 RX ORDER — FOLIC ACID 1 MG/1
1 TABLET ORAL DAILY
Qty: 30 TAB | Refills: 0 | Status: SHIPPED | OUTPATIENT
Start: 2019-05-03 | End: 2021-02-12

## 2019-05-02 RX ORDER — ASPIRIN 325 MG
325 TABLET ORAL DAILY
Qty: 100 TAB | Refills: 1 | Status: SHIPPED | OUTPATIENT
Start: 2019-05-03 | End: 2021-02-12

## 2019-05-02 RX ORDER — ALLOPURINOL 100 MG/1
200 TABLET ORAL DAILY
Qty: 30 TAB | Refills: 0 | Status: SHIPPED | OUTPATIENT
Start: 2019-05-03 | End: 2023-07-19 | Stop reason: SDUPTHER

## 2019-05-02 RX ORDER — LISINOPRIL 20 MG/1
20 TABLET ORAL DAILY
Qty: 30 TAB | Refills: 1 | Status: SHIPPED | OUTPATIENT
Start: 2019-05-03

## 2019-05-02 RX ORDER — IBUPROFEN 600 MG/1
600 TABLET ORAL EVERY 6 HOURS PRN
Status: DISCONTINUED | OUTPATIENT
Start: 2019-05-02 | End: 2019-05-03 | Stop reason: HOSPADM

## 2019-05-02 RX ORDER — AMLODIPINE BESYLATE 5 MG/1
5 TABLET ORAL DAILY
Qty: 30 TAB | Refills: 1 | Status: SHIPPED | OUTPATIENT
Start: 2019-05-03

## 2019-05-02 RX ORDER — AMLODIPINE BESYLATE 5 MG/1
5 TABLET ORAL
Status: DISCONTINUED | OUTPATIENT
Start: 2019-05-02 | End: 2019-05-03 | Stop reason: HOSPADM

## 2019-05-02 RX ORDER — AMOXICILLIN 250 MG
2 CAPSULE ORAL 2 TIMES DAILY
Qty: 30 TAB | Refills: 0 | Status: SHIPPED | OUTPATIENT
Start: 2019-05-02 | End: 2021-02-12

## 2019-05-02 RX ORDER — METOPROLOL TARTRATE 100 MG/1
100 TABLET ORAL 2 TIMES DAILY
Qty: 60 TAB | Refills: 1 | Status: SHIPPED | OUTPATIENT
Start: 2019-05-02 | End: 2021-02-12

## 2019-05-02 RX ORDER — OXYCODONE HYDROCHLORIDE 5 MG/1
2.5 TABLET ORAL EVERY 8 HOURS PRN
Status: DISCONTINUED | OUTPATIENT
Start: 2019-05-02 | End: 2019-05-03 | Stop reason: HOSPADM

## 2019-05-02 RX ORDER — LANOLIN ALCOHOL/MO/W.PET/CERES
100 CREAM (GRAM) TOPICAL DAILY
Qty: 30 TAB | Refills: 0 | Status: SHIPPED | OUTPATIENT
Start: 2019-05-03 | End: 2021-02-12

## 2019-05-02 RX ADMIN — GABAPENTIN 900 MG: 300 CAPSULE ORAL at 12:09

## 2019-05-02 RX ADMIN — OXYCODONE HYDROCHLORIDE 10 MG: 5 TABLET ORAL at 03:41

## 2019-05-02 RX ADMIN — METOPROLOL TARTRATE 100 MG: 50 TABLET ORAL at 05:19

## 2019-05-02 RX ADMIN — QUETIAPINE FUMARATE 100 MG: 25 TABLET ORAL at 20:26

## 2019-05-02 RX ADMIN — CALCIUM 500 MG: 500 TABLET ORAL at 09:14

## 2019-05-02 RX ADMIN — DULOXETINE HYDROCHLORIDE 60 MG: 60 CAPSULE, DELAYED RELEASE ORAL at 05:20

## 2019-05-02 RX ADMIN — MAGNESIUM OXIDE TAB 400 MG (241.3 MG ELEMENTAL MG) 400 MG: 400 (241.3 MG) TAB at 05:20

## 2019-05-02 RX ADMIN — LORAZEPAM 1 MG: 1 TABLET ORAL at 20:26

## 2019-05-02 RX ADMIN — POLYMYXIN B SULFATE, TRIMETHOPRIM SULFATE 1 DROP: 10000; 1 SOLUTION/ DROPS OPHTHALMIC at 09:15

## 2019-05-02 RX ADMIN — MAGNESIUM OXIDE TAB 400 MG (241.3 MG ELEMENTAL MG) 400 MG: 400 (241.3 MG) TAB at 17:35

## 2019-05-02 RX ADMIN — CALCIUM 500 MG: 500 TABLET ORAL at 17:35

## 2019-05-02 RX ADMIN — OXYCODONE HYDROCHLORIDE 10 MG: 5 TABLET ORAL at 09:15

## 2019-05-02 RX ADMIN — POLYMYXIN B SULFATE, TRIMETHOPRIM SULFATE 1 DROP: 10000; 1 SOLUTION/ DROPS OPHTHALMIC at 12:12

## 2019-05-02 RX ADMIN — ENOXAPARIN SODIUM 40 MG: 100 INJECTION SUBCUTANEOUS at 05:20

## 2019-05-02 RX ADMIN — METOPROLOL TARTRATE 100 MG: 50 TABLET ORAL at 17:36

## 2019-05-02 RX ADMIN — OXYCODONE HYDROCHLORIDE 2.5 MG: 5 TABLET ORAL at 17:35

## 2019-05-02 RX ADMIN — ALLOPURINOL 200 MG: 100 TABLET ORAL at 05:20

## 2019-05-02 RX ADMIN — CALCIUM 500 MG: 500 TABLET ORAL at 12:10

## 2019-05-02 RX ADMIN — THERA TABS 1 TABLET: TAB at 05:19

## 2019-05-02 RX ADMIN — OXYCODONE HYDROCHLORIDE 10 MG: 5 TABLET ORAL at 13:16

## 2019-05-02 RX ADMIN — Medication 100 MG: at 05:20

## 2019-05-02 RX ADMIN — GABAPENTIN 900 MG: 300 CAPSULE ORAL at 17:36

## 2019-05-02 RX ADMIN — LISINOPRIL 20 MG: 20 TABLET ORAL at 05:20

## 2019-05-02 RX ADMIN — METFORMIN HYDROCHLORIDE 1000 MG: 500 TABLET, FILM COATED ORAL at 09:14

## 2019-05-02 RX ADMIN — FOLIC ACID 1 MG: 1 TABLET ORAL at 05:20

## 2019-05-02 RX ADMIN — CLONIDINE HYDROCHLORIDE 0.1 MG: 0.1 TABLET ORAL at 03:48

## 2019-05-02 RX ADMIN — ASPIRIN 325 MG: 325 TABLET, FILM COATED ORAL at 05:20

## 2019-05-02 RX ADMIN — MORPHINE SULFATE 30 MG: 30 TABLET, FILM COATED, EXTENDED RELEASE ORAL at 17:36

## 2019-05-02 RX ADMIN — SENNOSIDES,DOCUSATE SODIUM 2 TABLET: 8.6; 5 TABLET, FILM COATED ORAL at 17:36

## 2019-05-02 RX ADMIN — HYDRALAZINE HYDROCHLORIDE 10 MG: 20 INJECTION INTRAMUSCULAR; INTRAVENOUS at 09:14

## 2019-05-02 RX ADMIN — MORPHINE SULFATE 30 MG: 30 TABLET, FILM COATED, EXTENDED RELEASE ORAL at 05:20

## 2019-05-02 RX ADMIN — METFORMIN HYDROCHLORIDE 1000 MG: 500 TABLET, FILM COATED ORAL at 17:36

## 2019-05-02 RX ADMIN — AMLODIPINE BESYLATE 5 MG: 5 TABLET ORAL at 10:28

## 2019-05-02 RX ADMIN — GABAPENTIN 900 MG: 300 CAPSULE ORAL at 05:19

## 2019-05-02 ASSESSMENT — ENCOUNTER SYMPTOMS
COUGH: 0
DIZZINESS: 0
WEAKNESS: 1
MYALGIAS: 0
MEMORY LOSS: 0
HALLUCINATIONS: 0
CONSTIPATION: 0
PALPITATIONS: 0
FALLS: 1
SHORTNESS OF BREATH: 0
TREMORS: 0
SPEECH CHANGE: 0
FLANK PAIN: 0
CHILLS: 0
ABDOMINAL PAIN: 0
HEADACHES: 0
FEVER: 0
FOCAL WEAKNESS: 0
DIAPHORESIS: 0

## 2019-05-02 ASSESSMENT — LIFESTYLE VARIABLES: SUBSTANCE_ABUSE: 1

## 2019-05-02 NOTE — DISCHARGE PLANNING
Agency/Facility Name: [facility]  Spoke To: Marsha   Outcome: Patient accepted, will be able to be seen on Sunday 5/5.

## 2019-05-02 NOTE — PROGRESS NOTES
Patient A+Ox4, sitting up in chair, C/O pain to left rib area, oxycodone with good effect. On room air satting 93%, patient encouraged to take deep breaths and cough. Using IS, hitting 2500. On monitor. Up to bathroom , voiding without issue. Using call bell approp, bed locked, hourly rounding in place, whiteboard updated

## 2019-05-02 NOTE — PROGRESS NOTES
Assumed care at 1900. Received report from ALEXANDRE Rivas. First assessment completed, call light within reach. All questions asked. Will continue to monitor.

## 2019-05-02 NOTE — CARE PLAN
Problem: Infection  Goal: Will remain free from infection  Outcome: PROGRESSING AS EXPECTED      Problem: Venous Thromboembolism (VTW)/Deep Vein Thrombosis (DVT) Prevention:  Goal: Patient will participate in Venous Thrombosis (VTE)/Deep Vein Thrombosis (DVT)Prevention Measures  Outcome: PROGRESSING SLOWER THAN EXPECTED  Educated pt regarding SCDs.

## 2019-05-02 NOTE — DISCHARGE PLANNING
Representative from [facility] in to see pt. He told her he plans to return to work next week. Will not meet home-bound criteria if working. [facility] will not be able to initiate care. Will inform MD.

## 2019-05-02 NOTE — DISCHARGE PLANNING
Discussed in rounds,pt requesting information on ETOH cessation. [facility]. [facility] and they all are referred through primary care. Pt is enrolled in  mental health [facility] and has appointment scheduled for May 16,2019 at 0830.   Met with pt, provided with ETOH cessation resources and reminded of benefits through [facility] and about Mental Health appointment scheduled for May 16.

## 2019-05-02 NOTE — PROGRESS NOTES
Hospital Medicine Daily Progress Note    Date of Service  5/2/2019    Chief Complaint  64 y.o. male admitted 4/24/2019 with alcohol intoxication, fall with left chest pain    Hospital Course    61-year-old male admitted with acute alcohol intoxication post fall while sitting on toilet.  Complaints of left-sided chest pain.  Findings of A. fib RVR.  Reports routinely drinks Black Velvet 2 L bottle over couple days.  Diagnosed with left-sided rib fractures.    Interval Problem Update  Patient sitting up in chair  Reports ambulating without walker yesterday  Now titrated off supplemental oxygen at rest  Improving strength      Consultants/Specialty    Dr. Reyna , surgery    Code Status    Full code    Disposition  Discharged to home in a.m., on May 3 with clinical improvement  Plan PT/OT, wean O2  Lives with his wife    Encourage activity  Work note provided, RN to place on chart for discharge home will need to resume activity with light activity advised    Review of Systems  Review of Systems   Constitutional: Negative for chills, diaphoresis, fever and malaise/fatigue.   Respiratory: Negative for cough and shortness of breath.    Cardiovascular: Negative for chest pain (left rib pain ), palpitations and leg swelling.   Gastrointestinal: Negative for abdominal pain and constipation.   Genitourinary: Negative for flank pain and hematuria.   Musculoskeletal: Positive for falls. Negative for myalgias.   Neurological: Positive for weakness (Generalized). Negative for dizziness, tremors, speech change, focal weakness and headaches.   Psychiatric/Behavioral: Positive for substance abuse. Negative for hallucinations and memory loss.        Physical Exam  Temp:  [36 °C (96.8 °F)-36.9 °C (98.5 °F)] 36.9 °C (98.5 °F)  Pulse:  [73-97] 84  Resp:  [16-20] 20  BP: (142-187)/() 143/91  SpO2:  [89 %-97 %] 92 %    Physical Exam   Constitutional: He is oriented to person, place, and time. No distress.   Morbid obesity   HENT:    Head: Normocephalic and atraumatic.   Mouth/Throat: No oropharyngeal exudate.   Left periorbital ecchymosis   Eyes: Pupils are equal, round, and reactive to light. EOM are normal.   No purulent drainage  Periorbital ecchymosis     Neck: Normal range of motion. No JVD present.   Cardiovascular: Normal rate and intact distal pulses.    No murmur heard.  Irregular, irregular   Pulmonary/Chest: Effort normal. No respiratory distress. He has no wheezes. He has no rales. He exhibits no tenderness (left chest ).   Diminished breath sounds at bases   Abdominal: Soft. Bowel sounds are normal. He exhibits no distension and no mass.   Obese   Musculoskeletal: He exhibits no edema or tenderness.   Generalized 4/5 strength   Neurological: He is alert and oriented to person, place, and time. No cranial nerve deficit. Coordination normal.   No focal weakness   Skin: Skin is warm and dry. He is not diaphoretic.   Psychiatric: His behavior is normal. Judgment and thought content normal. His mood appears anxious.   Nursing note and vitals reviewed.      Fluids    Intake/Output Summary (Last 24 hours) at 05/02/19 1431  Last data filed at 05/02/19 1300   Gross per 24 hour   Intake               30 ml   Output              500 ml   Net             -470 ml       Laboratory                        Imaging  EC-ECHOCARDIOGRAM COMPLETE W/O CONT   Final Result   CONCLUSIONS  Normal left ventricular size and systolic function.  Mild concentric left ventricular hypertrophy.  The aortic valve is sclerotic with good leaflet mobility.  Estimated right ventricular systolic pressure  is 50 mmHg.  No prior study is available for comparison.   DX-CHEST-LIMITED (1 VIEW)   Final Result      1.  Cardiomegaly.      2.  Multiple left rib fractures.      US-RUQ   Final Result      1.  Prior cholecystectomy. No evidence of biliary ductal dilatation.      2.  The liver is echogenic consistent with fatty change versus hepatocellular dysfunction.       CT-CHEST,ABDOMEN,PELVIS WITH   Final Result   Addendum 1 of 1   There are multiple old left rib fractures.   There are acute fractures of the left fifth, 6, seventh, ninth, and    possibly 10th ribs.   No pulmonary contusion identified.      Final      No acute injury identified.   4 mm right lower lobe nodule containing punctate calcification most likely granuloma.   Hepatic steatosis.   Surgical absence gallbladder.   Lobulated kidneys no hydronephrosis.   No evidence of bowel obstruction. No free fluid.      CT-CSPINE WITHOUT PLUS RECONS   Final Result      No acute fracture. Degenerative changes.      CT-HEAD W/O   Final Result      1.  Cerebral atrophy.      2.  White matter lucencies most consistent with small vessel ischemic change versus demyelination or gliosis.      3.  Otherwise, Head CT without contrast with no acute findings. No evidence of acute cerebral hemorrhage or mass lesion.           Assessment/Plan  * AF (atrial fibrillation) (HCC)   Assessment & Plan    New diagnosis of alcohol intoxication.  Patient does not have sensation of palpitations , unknown duration.  Echo preserved LV function  A. fib RVR -improved--continue Lopressor  Benefits versus risk of anticoagulation were discussed with patient.  Patient situation complicated by frequent falls, active alcoholism, poor compliance resulting in significantly higher bleed risk.     Continue aspirin therapy  Outpatient referral to cardiology  Monitor telemetry       Acute hypoxemic respiratory failure (HCC)   Assessment & Plan    O2 support  Wean down O2  Encourage I-S  Control rib pain     Alcoholic intoxication without complication (HCC)   Assessment & Plan    Withdrawal symptoms resolving.  Scheduled Neurontin  Thiamine, folate, multivitamin  Diet as tolerated  Continue PT/OT  As needed Ativan for anxiety    4/30 not in withdrawal  States that he would like to participate in rehab programs  [facility]    5/2 interested in participation in rehab program   to assist, to provide additional resources  Encourage activity and ambulation  Patient is not a candidate for home health, not homebound  Plans to return to work  Work note provided       Troponin level elevated   Assessment & Plan    Troponin indeterminate range-trending down.  Likely from A. fib RVR, demand.  Echo preserved LV function.  Telemetry  Follow clinically  Left chest pain is due to rib fractures.       Closed fracture of multiple ribs of left side with routine healing   Assessment & Plan    Uncontrolled left rib pain .  Patient followed by trauma surgery - plan supportive treatment  MS Contin for pain control.  PRN oxycodone and IV morphine  encourage I-S  Mobilize with walker  O2 support as needed  PT/OT  Add Lidoderm patch and try to decrease reliance on opiates           Increased anion gap metabolic acidosis   Assessment & Plan    Likely etoh, starvation ketosis.  Resolved  Encourage intake.   Monitor CMP       Transaminitis   Assessment & Plan    High AST/ALT ratio, mild-likely alcoholic hepatitis.  Ultrasound findings of hepatocellular dysfunction  Monitor liver enzymes  Advised abstinence from alcohol     Conjunctivitis   Assessment & Plan    With crust and purulent drainage suspect bacterial-improved  Continue with Polytrim eyedrops-complete 5-day course  Visine for irritation dryness     Hypocalcemia   Assessment & Plan    Correcting  IV magnesium  Continue Ca carb.  Follow-up BMP       Hypomagnesemia   Assessment & Plan    Persistent  Continue oral magnesium.  Additional IV magnesium  Follow-up magnesium     Lung nodule   Assessment & Plan    4 mm right lower lobe nodule containing punctate calcification most likely granuloma.  Outpatient followup recommended.      Hypokalemia   Assessment & Plan    Corrected  Follow up electrolytes.      Essential hypertension   Assessment & Plan    Continue with beta-blocker  As needed  clonidine for withdrawal benefits  Monitor BP    lisinopril, adjust  -echo EF of 70 %         Type 2 diabetes mellitus with diabetic polyneuropathy, without long-term current use of insulin ([facility])   Assessment & Plan    With hyperglycemia, labile blood sugars  Insulin sliding scale as needed.  Continue home gabapentin  Diabetic diet  Continue metformin     Obesity- (present on admission)   Assessment & Plan    Weight loss counseling     PHILLY on CPAP   Assessment & Plan    Resume    Tapered off supplemental oxygen needs  We will ambulate and reassess            VTE prophylaxis: SCDs    Discussed with case management plan of care.

## 2019-05-02 NOTE — DISCHARGE PLANNING
Received Choice form at 0810  Agency/Facility Name: [facility]  Referral sent per Choice form at 0820

## 2019-05-02 NOTE — PROGRESS NOTES
Patient ambulated in hallway on room air, maintained 02 at 89% or greater. Minimal SOB with ambulation, came up to 93% with deep breaths and coughing. Patient encouraged to cough, deep breathe and use IS to prevent pneumonia

## 2019-05-03 VITALS
OXYGEN SATURATION: 94 % | HEIGHT: 66 IN | RESPIRATION RATE: 20 BRPM | SYSTOLIC BLOOD PRESSURE: 122 MMHG | TEMPERATURE: 97.6 F | DIASTOLIC BLOOD PRESSURE: 75 MMHG | HEART RATE: 71 BPM | WEIGHT: 270.06 LBS | BODY MASS INDEX: 43.4 KG/M2

## 2019-05-03 LAB
ANION GAP SERPL CALC-SCNC: 10 MMOL/L (ref 0–11.9)
BUN SERPL-MCNC: 11 MG/DL (ref 8–22)
CALCIUM SERPL-MCNC: 8.6 MG/DL (ref 8.5–10.5)
CHLORIDE SERPL-SCNC: 95 MMOL/L (ref 96–112)
CO2 SERPL-SCNC: 30 MMOL/L (ref 20–33)
CREAT SERPL-MCNC: 0.8 MG/DL (ref 0.5–1.4)
GLUCOSE BLD-MCNC: 104 MG/DL (ref 65–99)
GLUCOSE BLD-MCNC: 115 MG/DL (ref 65–99)
GLUCOSE SERPL-MCNC: 100 MG/DL (ref 65–99)
POTASSIUM SERPL-SCNC: 3.6 MMOL/L (ref 3.6–5.5)
SODIUM SERPL-SCNC: 135 MMOL/L (ref 135–145)

## 2019-05-03 PROCEDURE — 700102 HCHG RX REV CODE 250 W/ 637 OVERRIDE(OP): Performed by: INTERNAL MEDICINE

## 2019-05-03 PROCEDURE — 700102 HCHG RX REV CODE 250 W/ 637 OVERRIDE(OP): Performed by: HOSPITALIST

## 2019-05-03 PROCEDURE — 99239 HOSP IP/OBS DSCHRG MGMT >30: CPT | Performed by: INTERNAL MEDICINE

## 2019-05-03 PROCEDURE — 700111 HCHG RX REV CODE 636 W/ 250 OVERRIDE (IP): Performed by: INTERNAL MEDICINE

## 2019-05-03 PROCEDURE — 82962 GLUCOSE BLOOD TEST: CPT

## 2019-05-03 PROCEDURE — A9270 NON-COVERED ITEM OR SERVICE: HCPCS | Performed by: HOSPITALIST

## 2019-05-03 PROCEDURE — 36415 COLL VENOUS BLD VENIPUNCTURE: CPT

## 2019-05-03 PROCEDURE — A9270 NON-COVERED ITEM OR SERVICE: HCPCS | Performed by: INTERNAL MEDICINE

## 2019-05-03 PROCEDURE — 80048 BASIC METABOLIC PNL TOTAL CA: CPT

## 2019-05-03 RX ADMIN — GABAPENTIN 900 MG: 300 CAPSULE ORAL at 04:50

## 2019-05-03 RX ADMIN — AMLODIPINE BESYLATE 5 MG: 5 TABLET ORAL at 04:54

## 2019-05-03 RX ADMIN — MAGNESIUM OXIDE TAB 400 MG (241.3 MG ELEMENTAL MG) 400 MG: 400 (241.3 MG) TAB at 04:51

## 2019-05-03 RX ADMIN — ASPIRIN 325 MG: 325 TABLET, FILM COATED ORAL at 04:50

## 2019-05-03 RX ADMIN — METFORMIN HYDROCHLORIDE 1000 MG: 500 TABLET, FILM COATED ORAL at 09:25

## 2019-05-03 RX ADMIN — DULOXETINE HYDROCHLORIDE 60 MG: 60 CAPSULE, DELAYED RELEASE ORAL at 04:50

## 2019-05-03 RX ADMIN — Medication 100 MG: at 04:51

## 2019-05-03 RX ADMIN — ALLOPURINOL 200 MG: 100 TABLET ORAL at 04:51

## 2019-05-03 RX ADMIN — FOLIC ACID 1 MG: 1 TABLET ORAL at 04:51

## 2019-05-03 RX ADMIN — LISINOPRIL 20 MG: 20 TABLET ORAL at 04:57

## 2019-05-03 RX ADMIN — METOPROLOL TARTRATE 100 MG: 50 TABLET ORAL at 04:54

## 2019-05-03 RX ADMIN — THERA TABS 1 TABLET: TAB at 04:51

## 2019-05-03 RX ADMIN — ENOXAPARIN SODIUM 40 MG: 100 INJECTION SUBCUTANEOUS at 04:52

## 2019-05-03 RX ADMIN — MORPHINE SULFATE 30 MG: 30 TABLET, FILM COATED, EXTENDED RELEASE ORAL at 04:50

## 2019-05-03 RX ADMIN — CALCIUM 500 MG: 500 TABLET ORAL at 09:25

## 2019-05-03 NOTE — DISCHARGE INSTRUCTIONS
Discharge Instructions    Discharged to other by car with relative. Discharged via wheelchair, hospital escort: Refused.  Special equipment needed: Not Applicable    Be sure to schedule a follow-up appointment with your primary care doctor or any specialists as instructed.     Discharge Plan:   Pneumococcal Vaccine Administered/Refused: Given (See MAR)  Influenza Vaccine Indication: Indicated: 9 to 64 years of age  Influenza Vaccine Given - only chart on this line when given: Influenza Vaccine Given (See MAR)    I understand that a diet low in cholesterol, fat, and sodium is recommended for good health. Unless I have been given specific instructions below for another diet, I accept this instruction as my diet prescription.   Other diet: Diabetic    Special Instructions:    · Is patient discharged on Warfarin / Coumadin?   No     Depression / Suicide Risk    As you are discharged from this [facility] facility, it is important to learn how to keep safe from harming yourself.    Recognize the warning signs:  · Abrupt changes in personality, positive or negative- including increase in energy   · Giving away possessions  · Change in eating patterns- significant weight changes-  positive or negative  · Change in sleeping patterns- unable to sleep or sleeping all the time   · Unwillingness or inability to communicate  · Depression  · Unusual sadness, discouragement and loneliness  · Talk of wanting to die  · Neglect of personal appearance   · Rebelliousness- reckless behavior  · Withdrawal from people/activities they love  · Confusion- inability to concentrate     If you or a loved one observes any of these behaviors or has concerns about self-harm, here's what you can do:  · Talk about it- your feelings and reasons for harming yourself  · Remove any means that you might use to hurt yourself (examples: pills, rope, extension cords, firearm)  · Get professional help from the community (Mental Health, Substance Abuse,  psychological counseling)  · Do not be alone:Call your Safe Contact- someone whom you trust who will be there for you.  · Call your local CRISIS HOTLINE 037-9171 or 138-220-1462  · Call your local Children's Mobile Crisis Response Team [place] (467) 236-3142 or www.[employer]  · Call the toll free National Suicide Prevention Hotlines   · National Suicide Prevention Lifeline 841-039-NFBW (3780)  · [place] [facility] Line Network 800-SUICIDE (643-3502)        Atrial Fibrillation  Introduction  Atrial fibrillation is a type of heartbeat that is irregular or fast (rapid). If you have this condition, your heart keeps quivering in a weird (chaotic) way. This condition can make it so your heart cannot pump blood normally. Having this condition gives a person more risk for stroke, heart failure, and other heart problems. There are different types of atrial fibrillation. Talk with your doctor to learn about the type that you have.  Follow these instructions at home:  · Take over-the-counter and prescription medicines only as told by your doctor.  · If your doctor prescribed a blood-thinning medicine, take it exactly as told. Taking too much of it can cause bleeding. If you do not take enough of it, you will not have the protection that you need against stroke and other problems.  · Do not use any tobacco products. These include cigarettes, chewing tobacco, and e-cigarettes. If you need help quitting, ask your doctor.  · If you have apnea (obstructive sleep apnea), manage it as told by your doctor.  · Do not drink alcohol.  · Do not drink beverages that have caffeine. These include coffee, soda, and tea.  · Maintain a healthy weight. Do not use diet pills unless your doctor says they are safe for you. Diet pills may make heart problems worse.  · Follow diet instructions as told by your doctor.  · Exercise regularly as told by your doctor.  · Keep all follow-up visits as told by your doctor. This is important.  Contact  a doctor if:  · You notice a change in the speed, rhythm, or strength of your heartbeat.  · You are taking a blood-thinning medicine and you notice more bruising.  · You get tired more easily when you move or exercise.  Get help right away if:  · You have pain in your chest or your belly (abdomen).  · You have sweating or weakness.  · You feel sick to your stomach (nauseous).  · You notice blood in your throw up (vomit), poop (stool), or pee (urine).  · You are short of breath.  · You suddenly have swollen feet and ankles.  · You feel dizzy.  · Your suddenly get weak or numb in your face, arms, or legs, especially if it happens on one side of your body.  · You have trouble talking, trouble understanding, or both.  · Your face or your eyelid droops on one side.  These symptoms may be an emergency. Do not wait to see if the symptoms will go away. Get medical help right away. Call your local emergency services (911 in the [place] Do not drive yourself to the hospital.   This information is not intended to replace advice given to you by your health care provider. Make sure you discuss any questions you have with your health care provider.  Document Released: 09/26/2009 Document Revised: 05/25/2017 Document Reviewed: 04/13/2016  © 2017 Elsevier    Fall Prevention in the Home  Falls can cause injuries and can affect people from all age groups. There are many simple things that you can do to make your home safe and to help prevent falls.  What can I do on the outside of my home?  · Regularly repair the edges of walkways and driveways and fix any cracks.  · Remove high doorway thresholds.  · Trim any shrubbery on the main path into your home.  · Use bright outdoor lighting.  · Clear walkways of debris and clutter, including tools and rocks.  · Regularly check that handrails are securely fastened and in good repair. Both sides of any steps should have handrails.  · Install guardrails along the edges of any raised decks or  porches.  · Have leaves, snow, and ice cleared regularly.  · Use sand or salt on walkways during winter months.  · In the garage, clean up any spills right away, including grease or oil spills.  What can I do in the bathroom?  · Use night lights.  · Install grab bars by the toilet and in the tub and shower. Do not use towel bars as grab bars.  · Use non-skid mats or decals on the floor of the tub or shower.  · If you need to sit down while you are in the shower, use a plastic, non-slip stool.  · Keep the floor dry. Immediately clean up any water that spills on the floor.  · Remove soap buildup in the tub or shower on a regular basis.  · Attach bath mats securely with double-sided non-slip rug tape.  · Remove throw rugs and other tripping hazards from the floor.  What can I do in the bedroom?  · Use night lights.  · Make sure that a bedside light is easy to reach.  · Do not use oversized bedding that drapes onto the floor.  · Have a firm chair that has side arms to use for getting dressed.  · Remove throw rugs and other tripping hazards from the floor.  What can I do in the kitchen?  · Clean up any spills right away.  · Avoid walking on wet floors.  · Place frequently used items in easy-to-reach places.  · If you need to reach for something above you, use a sturdy step stool that has a grab bar.  · Keep electrical cables out of the way.  · Do not use floor polish or wax that makes floors slippery. If you have to use wax, make sure that it is non-skid floor wax.  · Remove throw rugs and other tripping hazards from the floor.  What can I do in the stairways?  · Do not leave any items on the stairs.  · Make sure that there are handrails on both sides of the stairs. Fix handrails that are broken or loose. Make sure that handrails are as long as the stairways.  · Check any carpeting to make sure that it is firmly attached to the stairs. Fix any carpet that is loose or worn.  · Avoid having throw rugs at the top or bottom  of stairways, or secure the rugs with carpet tape to prevent them from moving.  · Make sure that you have a light switch at the top of the stairs and the bottom of the stairs. If you do not have them, have them installed.  What are some other fall prevention tips?  · Wear closed-toe shoes that fit well and support your feet. Wear shoes that have rubber soles or low heels.  · When you use a stepladder, make sure that it is completely opened and that the sides are firmly locked. Have someone hold the ladder while you are using it. Do not climb a closed stepladder.  · Add color or contrast paint or tape to grab bars and handrails in your home. Place contrasting color strips on the first and last steps.  · Use mobility aids as needed, such as canes, walkers, scooters, and crutches.  · Turn on lights if it is dark. Replace any light bulbs that burn out.  · Set up furniture so that there are clear paths. Keep the furniture in the same spot.  · Fix any uneven floor surfaces.  · Choose a carpet design that does not hide the edge of steps of a stairway.  · Be aware of any and all pets.  · Review your medicines with your healthcare provider. Some medicines can cause dizziness or changes in blood pressure, which increase your risk of falling.  Talk with your health care provider about other ways that you can decrease your risk of falls. This may include working with a physical therapist or  to improve your strength, balance, and endurance.  This information is not intended to replace advice given to you by your health care provider. Make sure you discuss any questions you have with your health care provider.  Document Released: 12/08/2003 Document Revised: 05/16/2017 Document Reviewed: 01/22/2016  Elsevier Interactive Patient Education © 2017 Elsevier Inc.    Alcohol Intoxication  Alcohol intoxication happens when a person cannot think clearly or function well (becomes impaired) after drinking alcohol. This condition  can happen even after one drink. It can be dangerous, especially if:  · The person drank a lot of alcohol in a short amount of time (binge drinking).  · The person also took certain medicines or drugs.  If the intoxication is very bad, a breathing machine (ventilator) may be needed until the alcohol wears off.  Follow these instructions at home:  General instructions  · Do not drive after drinking alcohol.  · Have someone stay with you. You should not be left alone while you have this condition.  · Make sure you have enough fluid in your body. To do this:  ¨ Drink enough fluid to keep your pee (urine) clear or pale yellow.  ¨ Avoid caffeine. It can make you thirsty.  · Take over-the-counter and prescription medicines only as told by your doctor.  To prevent this condition:  · Limit alcohol intake to no more than 1 drink a day for nonpregnant women and 2 drinks a day for men. One drink equals 12 oz of beer, 5 oz of wine, or 1½ oz of hard liquor.  · Do not drink alcohol on an empty stomach.  · Avoid drinking alcohol if:  ¨ You are under the legal drinking age.  ¨ You are pregnant or may be pregnant.  ¨ You are taking medicines that you should not take with alcohol.  ¨ Your drinking causes your medical condition to get worse.  ¨ You need to drive or do activities that require your attention.  ¨ You have substance use disorder.  If this condition happens again:  · Get help right away if you or someone you know:  ¨ Has medium or very bad trouble with:  § Movement (coordination).  § Speech.  § Memory.  § Attention.  ¨ Passes out (faints).  ¨ Is confused.  ¨ Is throwing up (vomiting).  · Do not leave someone with this condition alone.  Contact a doctor if:  · You do not feel better after a few days.  · You are having problems at work, at school, or at home because of drinking.  Get help right away if:  · You get shaky when you try to stop drinking.  · You start shaking and you cannot control it (have a seizure).  · You  throw up blood. The blood may be bright red or look like coffee grounds.  · You see blood in your poop (stool). The blood may:  ¨ Be bright red.  ¨ Make your poop black and tarry and make it smell bad.  · You start to feel light-headed.  · You pass out.  If you ever feel like you may hurt yourself or others, or have thoughts about taking your own life, get help right away. You can go to your nearest emergency department or call:  · Your local emergency services (911 in the [place]  · A suicide crisis helpline, such as the National Suicide Prevention Lifeline at 1-470.206.2272. This is open 24 hours a day.  This information is not intended to replace advice given to you by your health care provider. Make sure you discuss any questions you have with your health care provider.  Document Released: 06/05/2009 Document Revised: 09/07/2017 Document Reviewed: 09/07/2017  [employer] Interactive Patient Education © 2017 [employer]    Alcohol Intoxication  Alcohol intoxication happens when a person cannot think clearly or function well (becomes impaired) after drinking alcohol. This condition can happen even after one drink. It can be dangerous, especially if:  · The person drank a lot of alcohol in a short amount of time (binge drinking).  · The person also took certain medicines or drugs.  If the intoxication is very bad, a breathing machine (ventilator) may be needed until the alcohol wears off.  Follow these instructions at home:  General instructions  · Do not drive after drinking alcohol.  · Have someone stay with you. You should not be left alone while you have this condition.  · Make sure you have enough fluid in your body. To do this:  ¨ Drink enough fluid to keep your pee (urine) clear or pale yellow.  ¨ Avoid caffeine. It can make you thirsty.  · Take over-the-counter and prescription medicines only as told by your doctor.  To prevent this condition:  · Limit alcohol intake to no more than 1 drink a day for  nonpregnant women and 2 drinks a day for men. One drink equals 12 oz of beer, 5 oz of wine, or 1½ oz of hard liquor.  · Do not drink alcohol on an empty stomach.  · Avoid drinking alcohol if:  ¨ You are under the legal drinking age.  ¨ You are pregnant or may be pregnant.  ¨ You are taking medicines that you should not take with alcohol.  ¨ Your drinking causes your medical condition to get worse.  ¨ You need to drive or do activities that require your attention.  ¨ You have substance use disorder.  If this condition happens again:  · Get help right away if you or someone you know:  ¨ Has medium or very bad trouble with:  § Movement (coordination).  § Speech.  § Memory.  § Attention.  ¨ Passes out (faints).  ¨ Is confused.  ¨ Is throwing up (vomiting).  · Do not leave someone with this condition alone.  Contact a doctor if:  · You do not feel better after a few days.  · You are having problems at work, at school, or at home because of drinking.  Get help right away if:  · You get shaky when you try to stop drinking.  · You start shaking and you cannot control it (have a seizure).  · You throw up blood. The blood may be bright red or look like coffee grounds.  · You see blood in your poop (stool). The blood may:  ¨ Be bright red.  ¨ Make your poop black and tarry and make it smell bad.  · You start to feel light-headed.  · You pass out.  If you ever feel like you may hurt yourself or others, or have thoughts about taking your own life, get help right away. You can go to your nearest emergency department or call:  · Your local emergency services (911 in the [place]  · A suicide crisis helpline, such as the National Suicide Prevention Lifeline at 1-957.713.5185. This is open 24 hours a day.  This information is not intended to replace advice given to you by your health care provider. Make sure you discuss any questions you have with your health care provider.  Document Released: 06/05/2009 Document Revised: 09/07/2017  "Document Reviewed: 09/07/2017  [employer] Interactive Patient Education © 2017 [employer]    Conjunctivitis  Conjunctivitis is commonly called \"pink eye.\" Conjunctivitis can be caused by bacterial or viral infection, allergies, or injuries. There is usually redness of the lining of the eye, itching, discomfort, and sometimes discharge. There may be deposits of matter along the eyelids. A viral infection usually causes a watery discharge, while a bacterial infection causes a yellowish, thick discharge. Pink eye is very contagious and spreads by direct contact.  You may be given antibiotic eyedrops as part of your treatment. Before using your eye medicine, remove all drainage from the eye by washing gently with warm water and cotton balls. Continue to use the medication until you have awakened 2 mornings in a row without discharge from the eye. Do not rub your eye. This increases the irritation and helps spread infection. Use separate towels from other household members. Wash your hands with soap and water before and after touching your eyes. Use cold compresses to reduce pain and sunglasses to relieve irritation from light. Do not wear contact lenses or wear eye makeup until the infection is gone.  SEEK MEDICAL CARE IF:   · Your symptoms are not better after 3 days of treatment.  · You have increased pain or trouble seeing.  · The outer eyelids become very red or swollen.  Document Released: 01/25/2006 Document Revised: 03/11/2013 Document Reviewed: 12/18/2006  ExitCare® Patient Information ©2014 [employer].    Allopurinol tablets  What is this medicine?  ALLOPURINOL (al oh PURE i nole) reduces the amount of uric acid the body makes. It is used to treat the symptoms of gout. It is also used to treat or prevent high uric acid levels that occur as a result of certain types of chemotherapy. This medicine may also help patients who frequently have kidney stones.  This medicine may be used for other purposes; ask your " health care provider or pharmacist if you have questions.  COMMON BRAND NAME(S): Zyloprim  What should I tell my health care provider before I take this medicine?  They need to know if you have any of these conditions:  -kidney or liver disease  -an unusual or allergic reaction to allopurinol, other medicines, foods, dyes, or preservatives  -pregnant or trying to get pregnant  -breast feeding  How should I use this medicine?  Take this medicine by mouth with a glass of water. Follow the directions on the prescription label. If this medicine upsets your stomach, take it with food or milk. Take your doses at regular intervals. Do not take your medicine more often than directed.  Talk to your pediatrician regarding the use of this medicine in children. Special care may be needed. While this drug may be prescribed for children as young as 6 years for selected conditions, precautions do apply.  Overdosage: If you think you have taken too much of this medicine contact a poison control center or emergency room at once.  NOTE: This medicine is only for you. Do not share this medicine with others.  What if I miss a dose?  If you miss a dose, take it as soon as you can. If it is almost time for your next dose, take only that dose. Do not take double or extra doses.  What may interact with this medicine?  Do not take this medicine with the following medication:  -didanosine, ddI  This medicine may also interact with the following medications:  -amoxicillin or ampicillin  -azathioprine  -certain medicines used to treat gout  -certain types of diuretics  -chlorpropamide  -cyclosporine  -dicumarol  -mercaptopurine  -tolbutamide  -warfarin  This list may not describe all possible interactions. Give your health care provider a list of all the medicines, herbs, non-prescription drugs, or dietary supplements you use. Also tell them if you smoke, drink alcohol, or use illegal drugs. Some items may interact with your medicine.  What  should I watch for while using this medicine?  Visit your doctor or health care professional for regular checks on your progress. If you are taking this medicine to treat gout, you may not have less frequent attacks at first. Keep taking your medicine regularly and the attacks should get better within 2 to 6 weeks. Drink plenty of water (10 to 12 full glasses a day) while you are taking this medicine. This will help to reduce stomach upset and reduce the risk of getting gout or kidney stones.  Call your doctor or health care professional at once if you get a skin rash together with chills, fever, sore throat, or nausea and vomiting, if you have blood in your urine, or difficulty passing urine.  Do not take vitamin C without asking your doctor or health care professional. Too much vitamin C can increase the chance of getting kidney stones.  You may get drowsy or dizzy. Do not drive, use machinery, or do anything that needs mental alertness until you know how this drug affects you. Do not stand or sit up quickly, especially if you are an older patient. This reduces the risk of dizzy or fainting spells. Alcohol can make you more drowsy and dizzy. Alcohol can also increase the chance of stomach problems and increase the amount of uric acid in your blood. Avoid alcoholic drinks.  What side effects may I notice from receiving this medicine?  Side effects that you should report to your doctor or health care professional as soon as possible:  -allergic reactions like skin rash, itching or hives, swelling of the face, lips, or tongue  -breathing problems  -muscle aches or pains  -redness, blistering, peeling or loosening of the skin, including inside the mouth  Side effects that usually do not require medical attention (report to your doctor or health care professional if they continue or are bothersome):  -changes in taste  -diarrhea  -indigestion  -stomach pain or cramps  This list may not describe all possible side  effects. Call your doctor for medical advice about side effects. You may report side effects to FDA at 1-351-WVF-1739.  Where should I keep my medicine?  Keep out of the reach of children.  Store at room temperature between 15 and 25 degrees C (59 and 77 degrees F). Protect from light and moisture. Throw away any unused medicine after the expiration date.  NOTE: This sheet is a summary. It may not cover all possible information. If you have questions about this medicine, talk to your doctor, pharmacist, or health care provider.  © 2018 Elsevier/Gold Standard (2009-06-22 14:26:54)    Amlodipine tablets  What is this medicine?  AMLODIPINE (am REANNA di peen) is a calcium-channel blocker. It affects the amount of calcium found in your heart and muscle cells. This relaxes your blood vessels, which can reduce the amount of work the heart has to do. This medicine is used to lower high blood pressure. It is also used to prevent chest pain.  This medicine may be used for other purposes; ask your health care provider or pharmacist if you have questions.  COMMON BRAND NAME(S): Norvasc  What should I tell my health care provider before I take this medicine?  They need to know if you have any of these conditions:  -heart problems like heart failure or aortic stenosis  -liver disease  -an unusual or allergic reaction to amlodipine, other medicines, foods, dyes, or preservatives  -pregnant or trying to get pregnant  -breast-feeding  How should I use this medicine?  Take this medicine by mouth with a glass of water. Follow the directions on the prescription label. Take your medicine at regular intervals. Do not take more medicine than directed.  Talk to your pediatrician regarding the use of this medicine in children. Special care may be needed. This medicine has been used in children as young as 6.  Persons over 65 years old may have a stronger reaction to this medicine and need smaller doses.  Overdosage: If you think you have  taken too much of this medicine contact a poison control center or emergency room at once.  NOTE: This medicine is only for you. Do not share this medicine with others.  What if I miss a dose?  If you miss a dose, take it as soon as you can. If it is almost time for your next dose, take only that dose. Do not take double or extra doses.  What may interact with this medicine?  -herbal or dietary supplements  -local or general anesthetics  -medicines for high blood pressure  -medicines for prostate problems  -rifampin  This list may not describe all possible interactions. Give your health care provider a list of all the medicines, herbs, non-prescription drugs, or dietary supplements you use. Also tell them if you smoke, drink alcohol, or use illegal drugs. Some items may interact with your medicine.  What should I watch for while using this medicine?  Visit your doctor or health care professional for regular check ups. Check your blood pressure and pulse rate regularly. Ask your health care professional what your blood pressure and pulse rate should be, and when you should contact him or her.  This medicine may make you feel confused, dizzy or lightheaded. Do not drive, use machinery, or do anything that needs mental alertness until you know how this medicine affects you. To reduce the risk of dizzy or fainting spells, do not sit or stand up quickly, especially if you are an older patient. Avoid alcoholic drinks; they can make you more dizzy.  Do not suddenly stop taking amlodipine. Ask your doctor or health care professional how you can gradually reduce the dose.  What side effects may I notice from receiving this medicine?  Side effects that you should report to your doctor or health care professional as soon as possible:  -allergic reactions like skin rash, itching or hives, swelling of the face, lips, or tongue  -breathing problems  -changes in vision or hearing  -chest pain  -fast, irregular heartbeat  -swelling  of legs or ankles  Side effects that usually do not require medical attention (report to your doctor or health care professional if they continue or are bothersome):  -dry mouth  -facial flushing  -nausea, vomiting  -stomach gas, pain  -tired, weak  -trouble sleeping  This list may not describe all possible side effects. Call your doctor for medical advice about side effects. You may report side effects to FDA at 1-651-BUM-9346.  Where should I keep my medicine?  Keep out of the reach of children.  Store at room temperature between 59 and 86 degrees F (15 and 30 degrees C). Protect from light. Keep container tightly closed. Throw away any unused medicine after the expiration date.  NOTE: This sheet is a summary. It may not cover all possible information. If you have questions about this medicine, talk to your doctor, pharmacist, or health care provider.  © 2018 [employer] Standard (2013-11-15 11:40:58)    Aspirin, ASA oral tablets  What is this medicine?  ASPIRIN (AS pir in) is a pain reliever. It is used to treat mild pain and fever. This medicine is also used as directed by a doctor to prevent and to treat heart attacks, to prevent strokes, and to treat arthritis or inflammation.  This medicine may be used for other purposes; ask your health care provider or pharmacist if you have questions.  COMMON BRAND NAME(S): Aspir-Low, Aspir-Sylvie, Aspirtab, Arslan Advanced Aspirin, Arslan Aspirin, Arslan Aspirin Extra Strength, Arslan Aspirin Plus, Arslan Extra Strength, Arslan Extra Strength Plus, Arslan Genuine Aspirin, Arslan Womens Aspirin, Bufferin, Bufferin Extra Strength, Bufferin Low Dose  What should I tell my health care provider before I take this medicine?  They need to know if you have any of these conditions:  -anemia  -asthma  -bleeding problems  -child with chickenpox, the flu, or other viral infection  -diabetes  -gout  -if you frequently drink alcohol containing drinks  -kidney disease  -liver disease  -low  level of vitamin K  -lupus  -smoke tobacco  -stomach ulcers or other problems  -an unusual or allergic reaction to aspirin, tartrazine dye, other medicines, dyes, or preservatives  -pregnant or trying to get pregnant  -breast-feeding  How should I use this medicine?  Take this medicine by mouth with a glass of water. Follow the directions on the package or prescription label. You can take this medicine with or without food. If it upsets your stomach, take it with food. Do not take your medicine more often than directed.  Talk to your pediatrician regarding the use of this medicine in children. While this drug may be prescribed for children as young as 12 years of age for selected conditions, precautions do apply. Children and teenagers should not use this medicine to treat chicken pox or flu symptoms unless directed by a doctor.  Patients over 65 years old may have a stronger reaction and need a smaller dose.  Overdosage: If you think you have taken too much of this medicine contact a poison control center or emergency room at once.  NOTE: This medicine is only for you. Do not share this medicine with others.  What if I miss a dose?  If you are taking this medicine on a regular schedule and miss a dose, take it as soon as you can. If it is almost time for your next dose, take only that dose. Do not take double or extra doses.  What may interact with this medicine?  Do not take this medicine with any of the following medications:  -cidofovir  -ketorolac  -probenecid  This medicine may also interact with the following medications:  -alcohol  -alendronate  -bismuth subsalicylate  -flavocoxid  -herbal supplements like feverfew, garlic, jesenia, ginkgo biloba, horse chestnut  -medicines for diabetes or glaucoma like acetazolamide, methazolamide  -medicines for gout  -medicines that treat or prevent blood clots like enoxaparin, heparin, ticlopidine, warfarin  -other aspirin and aspirin-like medicines  -NSAIDs, medicines  for pain and inflammation, like ibuprofen or naproxen  -pemetrexed  -sulfinpyrazone  -varicella live vaccine  This list may not describe all possible interactions. Give your health care provider a list of all the medicines, herbs, non-prescription drugs, or dietary supplements you use. Also tell them if you smoke, drink alcohol, or use illegal drugs. Some items may interact with your medicine.  What should I watch for while using this medicine?  If you are treating yourself for pain, tell your doctor or health care professional if the pain lasts more than 10 days, if it gets worse, or if there is a new or different kind of pain. Tell your doctor if you see redness or swelling. Also, check with your doctor if you have a fever that lasts for more than 3 days. Only take this medicine to prevent heart attacks or blood clotting if prescribed by your doctor or health care professional.  Do not take aspirin or aspirin-like medicines with this medicine. Too much aspirin can be dangerous. Always read the labels carefully.  This medicine can irritate your stomach or cause bleeding problems. Do not smoke cigarettes or drink alcohol while taking this medicine. Do not lie down for 30 minutes after taking this medicine to prevent irritation to your throat.  If you are scheduled for any medical or dental procedure, tell your healthcare provider that you are taking this medicine. You may need to stop taking this medicine before the procedure.  This medicine may be used to treat migraines. If you take migraine medicines for 10 or more days a month, your migraines may get worse. Keep a diary of headache days and medicine use. Contact your healthcare professional if your migraine attacks occur more frequently.  What side effects may I notice from receiving this medicine?  Side effects that you should report to your doctor or health care professional as soon as possible:  -allergic reactions like skin rash, itching or hives, swelling  of the face, lips, or tongue  -breathing problems  -changes in hearing, ringing in the ears  -confusion  -general ill feeling or flu-like symptoms  -pain on swallowing  -redness, blistering, peeling or loosening of the skin, including inside the mouth or nose  -signs and symptoms of bleeding such as bloody or black, tarry stools; red or dark-brown urine; spitting up blood or brown material that looks like coffee grounds; red spots on the skin; unusual bruising or bleeding from the eye, gums, or nose  -trouble passing urine or change in the amount of urine  -unusually weak or tired  -yellowing of the eyes or skin  Side effects that usually do not require medical attention (report to your doctor or health care professional if they continue or are bothersome):  -diarrhea or constipation  -headache  -nausea, vomiting  -stomach gas, heartburn  This list may not describe all possible side effects. Call your doctor for medical advice about side effects. You may report side effects to FDA at 0-747-FDA-3466.  Where should I keep my medicine?  Keep out of the reach of children.  Store at room temperature between 15 and 30 degrees C (59 and 86 degrees F). Protect from heat and moisture. Do not use this medicine if it has a strong vinegar smell. Throw away any unused medicine after the expiration date.  NOTE: This sheet is a summary. It may not cover all possible information. If you have questions about this medicine, talk to your doctor, pharmacist, or health care provider.  © 2018 [employer]/Gold Standard (2014-08-19 11:30:31)    Calcium Carbonate capsules and tablets  What is this medicine?  CALCIUM CARBONATE (RUKHSANA see um JD bon ate) is a calcium salt. It is used as an antacid to relieve the symptoms of indigestion and heartburn. It is also used to prevent osteoporosis, as a calcium supplement, and to treat high phosphate levels in patients with kidney disease.  This medicine may be used for other purposes; ask your health  care provider or pharmacist if you have questions.  COMMON BRAND NAME(S): AcidFree, Nephro-Calci  What should I tell my health care provider before I take this medicine?  They need to know if you have any of these conditions:  -constipation  -dehydration  -high blood calcium levels  -kidney disease  -stomach bleeding, obstruction or ulcer  -an unusual or allergic reaction to calcium carbonate, other medicines, foods, dyes, or preservatives  -pregnant or trying to get pregnant  -breast-feeding  How should I use this medicine?  Take this medicine by mouth with a glass of water. Follow the directions on the label. Antacids are usually taken after meals and at bedtime, or as directed by your doctor or health care professional. Take your medicine at regular intervals. Do not take your medicine more often than directed.  Talk to your pediatrician regarding the use of this medicine in children. While this medicine may be used in children for selected conditions, precautions do apply.  Overdosage: If you think you have taken too much of this medicine contact a poison control center or emergency room at once.  NOTE: This medicine is only for you. Do not share this medicine with others.  What if I miss a dose?  If you miss a dose, take it as soon as you can. If it is almost time for your next dose, take only that dose. Do not take double or extra doses.  What may interact with this medicine?  Do not take this medicine with any of the following medications:  -ammonium chloride  -methenamine  This medicine may also interact with the following medications:  -antibiotics like ciprofloxacin, tetracycline  -captopril  -delavirdine  -gabapentin  -iron supplements  -medicines for fungal infections like ketoconazole and itraconazole  -medicines for seizures like ethotoin and phenytoin  -mycophenolate  -quinidine  -rosuvastatin  -sucralfate  -thyroid medicine  This list may not describe all possible interactions. Give your health care  provider a list of all the medicines, herbs, non-prescription drugs, or dietary supplements you use. Also tell them if you smoke, drink alcohol, or use illegal drugs. Some items may interact with your medicine.  What should I watch for while using this medicine?  Tell your doctor or healthcare professional if your symptoms do not start to get better or if they get worse. Do not treat yourself for stomach problems with this medicine for more than 2 weeks. See a doctor if you have black tarry stools, rectal bleeding, or if you feel unusually tired. Do not change to another antacid product without advice.  If you are taking other medicines, leave an interval of at least 2 hours before or after taking this medicine.  To help reduce constipation, drink several glasses of water a day.  What side effects may I notice from receiving this medicine?  Side effects that you should report to your doctor or health care professional as soon as possible:  -allergic reactions like skin rash, itching or hives, swelling of the face, lips, or tongue  -confusion or irritability  -headache  -loss of appetite  -nausea, vomiting  -unusually weak or tired  Side effects that usually do not require medical attention (report to your doctor or health care professional if they continue or are bothersome):  -constipation  -stomach gas  This list may not describe all possible side effects. Call your doctor for medical advice about side effects. You may report side effects to FDA at 7-128-FDA-2155.  Where should I keep my medicine?  Keep out of the reach of children.  Store at room temperature between 15 and 30 degrees C (59 and 86 degrees F). Throw away any unused medicine after the expiration date.  NOTE: This sheet is a summary. It may not cover all possible information. If you have questions about this medicine, talk to your doctor, pharmacist, or health care provider.  © 2018 [employer]/Gold Standard (2009-03-30 21:56:26)    Folic Acid, Vitamin  B9 tablets  What is this medicine?  FOLIC ACID (FOE lik AS id) is a water-soluble, B complex vitamin. It is in many foods like liver, kidneys, yeast, and leafy, green vegetables. It is used to treat megaloblastic anemia and anemia from poor diet in pregnant women, babies, and children.  This medicine may be used for other purposes; ask your health care provider or pharmacist if you have questions.  COMMON BRAND NAME(S): Lizeth, Folicet  What should I tell my health care provider before I take this medicine?  They need to know if you have any of these conditions:  -alcoholism or alcohol cirrhosis  -pernicious anemia  -vitamin B12 deficient anemia  -an unusual or allergic reaction to folic acid, other B vitamins, other medicines, foods, dyes, or preservatives  -pregnant or trying to get pregnant  -breast-feeding  How should I use this medicine?  Take this medicine by mouth with a glass of water. Follow the directions on your prescription label. Take your doses at regular intervals. Do not stop taking your medicine unless your doctor tells you to.  Talk to your pediatrician regarding the use of this medicine in children. While this drug may be prescribed for selected conditions, precautions do apply.  Overdosage: If you think you have taken too much of this medicine contact a poison control center or emergency room at once.  NOTE: This medicine is only for you. Do not share this medicine with others.  What if I miss a dose?  If you miss a dose, take it as soon as you can. If it is almost time for your next dose, take only that dose. Do not take double or extra doses.  What may interact with this medicine?  -chloramphenicol  -cholestyramine  -medicines for seizures  -methotrexate  -nitrofurantoin  -pyrimethamine  This list may not describe all possible interactions. Give your health care provider a list of all the medicines, herbs, non-prescription drugs, or dietary supplements you use. Also tell them if you smoke,  drink alcohol, or use illegal drugs. Some items may interact with your medicine.  What should I watch for while using this medicine?  Visit your doctor or health care professional for regular check ups. Your doctor may order blood tests.  You need to eat a proper diet even while you are taking this vitamin. Taking vitamin supplements is not a substitute for a healthy diet. Ask your doctor or health care provider for good nutrition advice.  What side effects may I notice from receiving this medicine?  Side effects that you should report to your doctor or health care professional as soon as possible:  -allergic reactions such as skin rash or itching, hives, swelling of the lips, mouth, tongue, or throat  -chest tightness or pain  -wheezing or shortness of breath  Side effects that usually do not require medical attention (report to your doctor or health care professional if they continue or are bothersome):  -bitter or bad taste  -confusion  -irritable  -loss of appetite  -nausea  -stomach gas  This list may not describe all possible side effects. Call your doctor for medical advice about side effects. You may report side effects to FDA at 3-674-FDA-9636.  Where should I keep my medicine?  Keep out of the reach of children.  Store at room temperature between 15 and 30 degrees C (59 and 86 degrees F). Protect from light. This medicine is quickly broken down and made inactive when exposed to heat or light. Throw away any unused medicine after the expiration date.  NOTE: This sheet is a summary. It may not cover all possible information. If you have questions about this medicine, talk to your doctor, pharmacist, or health care provider.  © 2018 [employer] Standard (2009-04-22 17:03:56)    Ibuprofen tablets and capsules  What is this medicine?  IBUPROFEN (eye BYOO proe fen) is a non-steroidal anti-inflammatory drug (NSAID). It is used for dental pain, fever, headaches or migraines, osteoarthritis, rheumatoid arthritis,  or painful monthly periods. It can also relieve minor aches and pains caused by a cold, flu, or sore throat.  This medicine may be used for other purposes; ask your health care provider or pharmacist if you have questions.  COMMON BRAND NAME(S): Advil, Advil Diego Strength, Advil Migraine, Genpril, Ibren, IBU, Midol, Midol Cramps and Body Aches, Motrin, Motrin IB, Motrin Diego Strength, Motrin Migraine Pain, Shaun-8, Toxicology Saliva Collection  What should I tell my health care provider before I take this medicine?  They need to know if you have any of these conditions:  -asthma  -cigarette smoker  -drink more than 3 alcohol containing drinks a day  -heart disease or circulation problems such as heart failure or leg edema (fluid retention)  -high blood pressure  -kidney disease  -liver disease  -stomach bleeding or ulcers  -an unusual or allergic reaction to ibuprofen, aspirin, other NSAIDS, other medicines, foods, dyes, or preservatives  -pregnant or trying to get pregnant  -breast-feeding  How should I use this medicine?  Take this medicine by mouth with a glass of water. Follow the directions on the prescription label. Take this medicine with food if your stomach gets upset. Try to not lie down for at least 10 minutes after you take the medicine. Take your medicine at regular intervals. Do not take your medicine more often than directed.  A special MedGuide will be given to you by the pharmacist with each prescription and refill. Be sure to read this information carefully each time.  Talk to your pediatrician regarding the use of this medicine in children. Special care may be needed.  Overdosage: If you think you have taken too much of this medicine contact a poison control center or emergency room at once.  NOTE: This medicine is only for you. Do not share this medicine with others.  What if I miss a dose?  If you miss a dose, take it as soon as you can. If it is almost time for your next dose, take only  that dose. Do not take double or extra doses.  What may interact with this medicine?  Do not take this medicine with any of the following medications:  -cidofovir  -ketorolac  -methotrexate  -pemetrexed  This medicine may also interact with the following medications:  -alcohol  -aspirin  -diuretics  -lithium  -other drugs for inflammation like prednisone  -warfarin  This list may not describe all possible interactions. Give your health care provider a list of all the medicines, herbs, non-prescription drugs, or dietary supplements you use. Also tell them if you smoke, drink alcohol, or use illegal drugs. Some items may interact with your medicine.  What should I watch for while using this medicine?  Tell your doctor or healthcare professional if your symptoms do not start to get better or if they get worse.  This medicine does not prevent heart attack or stroke. In fact, this medicine may increase the chance of a heart attack or stroke. The chance may increase with longer use of this medicine and in people who have heart disease. If you take aspirin to prevent heart attack or stroke, talk with your doctor or health care professional.  Do not take other medicines that contain aspirin, ibuprofen, or naproxen with this medicine. Side effects such as stomach upset, nausea, or ulcers may be more likely to occur. Many medicines available without a prescription should not be taken with this medicine.  This medicine can cause ulcers and bleeding in the stomach and intestines at any time during treatment. Ulcers and bleeding can happen without warning symptoms and can cause death. To reduce your risk, do not smoke cigarettes or drink alcohol while you are taking this medicine.  You may get drowsy or dizzy. Do not drive, use machinery, or do anything that needs mental alertness until you know how this medicine affects you. Do not stand or sit up quickly, especially if you are an older patient. This reduces the risk of dizzy  or fainting spells.  This medicine can cause you to bleed more easily. Try to avoid damage to your teeth and gums when you brush or floss your teeth.  This medicine may be used to treat migraines. If you take migraine medicines for 10 or more days a month, your migraines may get worse. Keep a diary of headache days and medicine use. Contact your healthcare professional if your migraine attacks occur more frequently.  What side effects may I notice from receiving this medicine?  Side effects that you should report to your doctor or health care professional as soon as possible:  -allergic reactions like skin rash, itching or hives, swelling of the face, lips, or tongue  -severe stomach pain  -signs and symptoms of bleeding such as bloody or black, tarry stools; red or dark-brown urine; spitting up blood or brown material that looks like coffee grounds; red spots on the skin; unusual bruising or bleeding from the eye, gums, or nose  -signs and symptoms of a blood clot such as changes in vision; chest pain; severe, sudden headache; trouble speaking; sudden numbness or weakness of the face, arm, or leg  -unexplained weight gain or swelling  -unusually weak or tired  -yellowing of eyes or skin  Side effects that usually do not require medical attention (report to your doctor or health care professional if they continue or are bothersome):  -bruising  -diarrhea  -dizziness, drowsiness  -headache  -nausea, vomiting  This list may not describe all possible side effects. Call your doctor for medical advice about side effects. You may report side effects to FDA at 9-026-FDA-3163.  Where should I keep my medicine?  Keep out of the reach of children.  Store at room temperature between 15 and 30 degrees C (59 and 86 degrees F). Keep container tightly closed. Throw away any unused medicine after the expiration date.  NOTE: This sheet is a summary. It may not cover all possible information. If you have questions about this  medicine, talk to your doctor, pharmacist, or health care provider.  © 2018 [employer] (2014-08-19 10:48:02)      Lisinopril tablets  What is this medicine?  LISINOPRIL (lyse IN oh pril) is an ACE inhibitor. This medicine is used to treat high blood pressure and heart failure. It is also used to protect the heart immediately after a heart attack.  This medicine may be used for other purposes; ask your health care provider or pharmacist if you have questions.  COMMON BRAND NAME(S): Prinivil, Zestril  What should I tell my health care provider before I take this medicine?  They need to know if you have any of these conditions:  -diabetes  -heart or blood vessel disease  -kidney disease  -low blood pressure  -previous swelling of the tongue, face, or lips with difficulty breathing, difficulty swallowing, hoarseness, or tightening of the throat  -an unusual or allergic reaction to lisinopril, other ACE inhibitors, insect venom, foods, dyes, or preservatives  -pregnant or trying to get pregnant  -breast-feeding  How should I use this medicine?  Take this medicine by mouth with a glass of water. Follow the directions on your prescription label. You may take this medicine with or without food. If it upsets your stomach, take it with food. Take your medicine at regular intervals. Do not take it more often than directed. Do not stop taking except on your doctor's advice.  Talk to your pediatrician regarding the use of this medicine in children. Special care may be needed. While this drug may be prescribed for children as young as 6 years of age for selected conditions, precautions do apply.  Overdosage: If you think you have taken too much of this medicine contact a poison control center or emergency room at once.  NOTE: This medicine is only for you. Do not share this medicine with others.  What if I miss a dose?  If you miss a dose, take it as soon as you can. If it is almost time for your next dose, take only  that dose. Do not take double or extra doses.  What may interact with this medicine?  Do not take this medicine with any of the following medications:  -hymenoptera venom  -sacubitril; valsartan  This medicines may also interact with the following medications:  -aliskiren  -angiotensin receptor blockers, like losartan or valsartan  -certain medicines for diabetes  -diuretics  -everolimus  -gold compounds  -lithium  -NSAIDs, medicines for pain and inflammation, like ibuprofen or naproxen  -potassium salts or supplements  -salt substitutes  -sirolimus  -temsirolimus  This list may not describe all possible interactions. Give your health care provider a list of all the medicines, herbs, non-prescription drugs, or dietary supplements you use. Also tell them if you smoke, drink alcohol, or use illegal drugs. Some items may interact with your medicine.  What should I watch for while using this medicine?  Visit your doctor or health care professional for regular check ups. Check your blood pressure as directed. Ask your doctor what your blood pressure should be, and when you should contact him or her.  Do not treat yourself for coughs, colds, or pain while you are using this medicine without asking your doctor or health care professional for advice. Some ingredients may increase your blood pressure.  Women should inform their doctor if they wish to become pregnant or think they might be pregnant. There is a potential for serious side effects to an unborn child. Talk to your health care professional or pharmacist for more information.  Check with your doctor or health care professional if you get an attack of severe diarrhea, nausea and vomiting, or if you sweat a lot. The loss of too much body fluid can make it dangerous for you to take this medicine.  You may get drowsy or dizzy. Do not drive, use machinery, or do anything that needs mental alertness until you know how this drug affects you. Do not stand or sit up  quickly, especially if you are an older patient. This reduces the risk of dizzy or fainting spells. Alcohol can make you more drowsy and dizzy. Avoid alcoholic drinks.  Avoid salt substitutes unless you are told otherwise by your doctor or health care professional.  What side effects may I notice from receiving this medicine?  Side effects that you should report to your doctor or health care professional as soon as possible:  -allergic reactions like skin rash, itching or hives, swelling of the hands, feet, face, lips, throat, or tongue  -breathing problems  -signs and symptoms of kidney injury like trouble passing urine or change in the amount of urine  -signs and symptoms of increased potassium like muscle weakness; chest pain; or fast, irregular heartbeat  -signs and symptoms of liver injury like dark yellow or brown urine; general ill feeling or flu-like symptoms; light-colored stools; loss of appetite; nausea; right upper belly pain; unusually weak or tired; yellowing of the eyes or skin  -signs and symptoms of low blood pressure like dizziness; feeling faint or lightheaded, falls; unusually weak or tired  -stomach pain with or without nausea and vomiting  Side effects that usually do not require medical attention (report to your doctor or health care professional if they continue or are bothersome):  -changes in taste  -cough  -dizziness  -fever  -headache  -sensitivity to light  This list may not describe all possible side effects. Call your doctor for medical advice about side effects. You may report side effects to FDA at 9-495-FDA-4079.  Where should I keep my medicine?  Keep out of the reach of children.  Store at room temperature between 15 and 30 degrees C (59 and 86 degrees F). Protect from moisture. Keep container tightly closed. Throw away any unused medicine after the expiration date.  NOTE: This sheet is a summary. It may not cover all possible information. If you have questions about this  medicine, talk to your doctor, pharmacist, or health care provider.  © 2018 [employer] Standard (2017-02-06 12:52:35)    Multivitamin with Minerals and Iron formulations (oral solid dosage forms)  What is this medicine?  MULTIVITAMIN with MINERAL and IRON combinations are used to help provide good nutrition.  This medicine may be used for other purposes; ask your health care provider or pharmacist if you have questions.  COMMON BRAND NAME(S): Active FE, Androvite, Bacmin, Caromega, Centrum, Centrum Specialist Energy, Centrum Specialist Heart, Centrum Specialist Vision, CertaVite Senior with Antioxidant Nutrients, Daily Multiple, Daily Multiple Vitamins with Minerals, Daily Naisr plus Iron, Ferrocite Plus, Generix-T, Geritol, Geritol Multivitamin, Integra Plus, Megavite Multivitamin, Multilex, Multilex T&M, Multivitamin With Minerals, Niva-Plus, Nu-Iron V, One-Daily, Optivite PMT, PNV Prenatal Plus Multivitamin, Prenatal Plus, Prenatal Plus Low Iron, PreNatal Vitamins Plus, PrePLUS, Strovite Forte, Super Theravite-M, Thera Advanced, Thera-M, Thera-Tab M High Potency, Therapeutic-M, Therems, Therems-H, Therems-M, Uni-Thera M Advanced, Unicap M, Unicap SR, Unicomplex-M, Vitafol, Vol-Plus  What should I tell my health care provider before I take this medicine?  They need to know if you have any of these conditions:  -bleeding or clotting disorder  -history of anemia of any type  -other chronic health condition  -an unusual or allergic reaction to vitamins, minerals, other medicines, foods, dyes, or preservatives  -pregnant or trying to get pregnant  -breast-feeding  How should I use this medicine?  Take by mouth with a glass of water. May take with food. Some brands, but not all, may be chewed before swallowing. Follow the directions on the prescription or product label. The usual dose is one tablet once a day. Do not take your medicine more often than directed.  Contact your pediatrician regarding the use of this  medicine in children. Special care may be needed.  Overdosage: If you think you have taken too much of this medicine contact a poison control center or emergency room at once.  NOTE: This medicine is only for you. Do not share this medicine with others.  What if I miss a dose?  If you miss a dose, take it as soon as you can. If it is almost time for your next dose, take only that dose. Do not take double or extra doses.  What may interact with this medicine?  -antacids  -cefdinir  -cefditoren  -etidronate  -fluoroquinolone antibiotics (examples: ciprofloxacin, gatifloxacin, levofloxacin)  -levodopa  -tetracycline antibiotics (examples: doxycycline, minocycline, tetracycline)  -thyroid hormones  -warfarin  This list may not describe all possible interactions. Give your health care provider a list of all the medicines, herbs, non-prescription drugs, or dietary supplements you use. Also tell them if you smoke, drink alcohol, or use illegal drugs. Some items may interact with your medicine.  What should I watch for while using this medicine?  Get regular checks on your progress. Remember that vitamin and mineral supplements do not replace the need for good nutrition from a balanced diet. Talk with your health care professional if you have questions or need advice.  What side effects may I notice from receiving this medicine?  Side effects that you should report to your doctor or health care professional as soon as possible:  -allergic reaction such as skin rash or difficulty breathing  -vomiting  Side effects that usually do not require medical attention (report to your doctor or health care professional if they continue or are bothersome):  -nausea  -stomach upset  This list may not describe all possible side effects. Call your doctor for medical advice about side effects. You may report side effects to FDA at 9-547-FDA-9206.  Where should I keep my medicine?  Keep out of the reach of children.  Most vitamins and  minerals should be stored at controlled room temperature between 15 and 30 degrees C (59 and 86 degrees F). Check your specific product directions. Protect from heat and moisture. Throw away any unused medicine after the expiration date.  NOTE: This sheet is a summary. It may not cover all possible information. If you have questions about this medicine, talk to your doctor, pharmacist, or health care provider.  © 2018 [employer] Standard (2016-07-14 08:55:11)    Metoprolol tablets  What is this medicine?  METOPROLOL (me TOE proe lole) is a beta-blocker. Beta-blockers reduce the workload on the heart and help it to beat more regularly. This medicine is used to treat high blood pressure and to prevent chest pain. It is also used to after a heart attack and to prevent an additional heart attack from occurring.  This medicine may be used for other purposes; ask your health care provider or pharmacist if you have questions.  COMMON BRAND NAME(S): Lopressor  What should I tell my health care provider before I take this medicine?  They need to know if you have any of these conditions:  -diabetes  -heart or vessel disease like slow heart rate, worsening heart failure, heart block, sick sinus syndrome or Raynaud's disease  -kidney disease  -liver disease  -lung or breathing disease, like asthma or emphysema  -pheochromocytoma  -thyroid disease  -an unusual or allergic reaction to metoprolol, other beta-blockers, medicines, foods, dyes, or preservatives  -pregnant or trying to get pregnant  -breast-feeding  How should I use this medicine?  Take this medicine by mouth with a drink of water. Follow the directions on the prescription label. Take this medicine immediately after meals. Take your doses at regular intervals. Do not take more medicine than directed. Do not stop taking this medicine suddenly. This could lead to serious heart-related effects.  Talk to your pediatrician regarding the use of this medicine in children.  Special care may be needed.  Overdosage: If you think you have taken too much of this medicine contact a poison control center or emergency room at once.  NOTE: This medicine is only for you. Do not share this medicine with others.  What if I miss a dose?  If you miss a dose, take it as soon as you can. If it is almost time for your next dose, take only that dose. Do not take double or extra doses.  What may interact with this medicine?  This medicine may interact with the following medications:  -certain medicines for blood pressure, heart disease, irregular heart beat  -certain medicines for depression like monoamine oxidase (MAO) inhibitors, fluoxetine, or paroxetine  -clonidine  -dobutamine  -epinephrine  -isoproterenol  -reserpine  This list may not describe all possible interactions. Give your health care provider a list of all the medicines, herbs, non-prescription drugs, or dietary supplements you use. Also tell them if you smoke, drink alcohol, or use illegal drugs. Some items may interact with your medicine.  What should I watch for while using this medicine?  Visit your doctor or health care professional for regular check ups. Contact your doctor right away if your symptoms worsen. Check your blood pressure and pulse rate regularly. Ask your health care professional what your blood pressure and pulse rate should be, and when you should contact them.  You may get drowsy or dizzy. Do not drive, use machinery, or do anything that needs mental alertness until you know how this medicine affects you. Do not sit or stand up quickly, especially if you are an older patient. This reduces the risk of dizzy or fainting spells. Contact your doctor if these symptoms continue. Alcohol may interfere with the effect of this medicine. Avoid alcoholic drinks.  What side effects may I notice from receiving this medicine?  Side effects that you should report to your doctor or health care professional as soon as  possible:  -allergic reactions like skin rash, itching or hives  -cold or numb hands or feet  -depression  -difficulty breathing  -faint  -fever with sore throat  -irregular heartbeat, chest pain  -rapid weight gain  -swollen legs or ankles  Side effects that usually do not require medical attention (report to your doctor or health care professional if they continue or are bothersome):  -anxiety or nervousness  -change in sex drive or performance  -dry skin  -headache  -nightmares or trouble sleeping  -short term memory loss  -stomach upset or diarrhea  -unusually tired  This list may not describe all possible side effects. Call your doctor for medical advice about side effects. You may report side effects to FDA at 1-607-FDA-5962.  Where should I keep my medicine?  Keep out of the reach of children.  Store at room temperature between 15 and 30 degrees C (59 and 86 degrees F). Throw away any unused medicine after the expiration date.  NOTE: This sheet is a summary. It may not cover all possible information. If you have questions about this medicine, talk to your doctor, pharmacist, or health care provider.  © 2018 [employer] Standard (2014-08-22 14:40:36)    Thiamine, Vitamin B1 tablets  What is this medicine?  THIAMINE (THAHY uh min) is a vitamin B1. It is added to a healthy diet to prevent or to treat low vitamin B1 levels.  This vitamin may be used for other purposes; ask your health care provider or pharmacist if you have questions.  This medicine may be used for other purposes; ask your health care provider or pharmacist if you have questions.  What should I tell my health care provider before I take this medicine?  They need to know if you have any of the following conditions:  -Wernicke's disease  -an unusual or allergic reaction to B vitamins, other medicines, foods, dyes, or preservatives  -pregnant or trying to get pregnant  -breast-feeding  How should I use this medicine?  Take this medicine by mouth  with a glass of water. Follow the directions on the package or prescription label. For best results take this medicine with food. Take your medicine at regular intervals. Do not take your medicine more often than directed.  Talk to your pediatrician regarding the use of this medicine in children. While this medicine may be prescribed for selected conditions, precautions do apply.  Overdosage: If you think you have taken too much of this medicine contact a poison control center or emergency room at once.  NOTE: This medicine is only for you. Do not share this medicine with others.  What if I miss a dose?  If you miss a dose, take it as soon as you can. If it is almost time for your next dose, take only that dose. Do not take double or extra doses.  What may interact with this medicine?  Interactions are not expected.  This list may not describe all possible interactions. Give your health care provider a list of all the medicines, herbs, non-prescription drugs, or dietary supplements you use. Also tell them if you smoke, drink alcohol, or use illegal drugs. Some items may interact with your medicine.  What should I watch for while using this medicine?  Follow a healthy diet. Taking a vitamin supplement does not replace the need for a balanced diet. Some foods that have this vitamin naturally are yeast, beans, peas, nuts, pork, and beef. Limit alcohol, smoking and stress.  Too much of this vitamin can be unsafe. Talk to your doctor or health care provider about how much is right for you.  What side effects may I notice from receiving this medicine?  Side effects that you should report to your doctor or health care professional as soon as possible:  -allergic reactions like skin rash, itching or hives, swelling of the face, lips, or tongue  -chest tightness  -fast, irregular heartbeat  -irritable, restless  -nausea, vomiting  -sweating  -unusually bleeding or bruising  Side effects that usually do not require medical  attention (report to your doctor or health care professional if they continue or are bothersome):  -sneezing  This list may not describe all possible side effects. Call your doctor for medical advice about side effects. You may report side effects to FDA at 2-385-FDA-8350.  Where should I keep my medicine?  Keep out of the reach of children.  Store at room temperature between 15 and 30 degrees C (59 and 85 degrees F). Protect from heat and light. Throw away any unused medicine after the expiration date.  NOTE: This sheet is a summary. It may not cover all possible information. If you have questions about this medicine, talk to your doctor, pharmacist, or health care provider.  © 2018 [employer] Standard (2009-09-03 12:50:29)      Metformin tablets  What is this medicine?  METFORMIN (met FOR min) is used to treat type 2 diabetes. It helps to control blood sugar. Treatment is combined with diet and exercise. This medicine can be used alone or with other medicines for diabetes.  This medicine may be used for other purposes; ask your health care provider or pharmacist if you have questions.  COMMON BRAND NAME(S): Glucophage  What should I tell my health care provider before I take this medicine?  They need to know if you have any of these conditions:  -anemia  -frequently drink alcohol-containing beverages  -become easily dehydrated  -heart attack  -heart failure that is treated with medications  -kidney disease  -liver disease  -polycystic ovary syndrome  -serious infection or injury  -vomiting  -an unusual or allergic reaction to metformin, other medicines, foods, dyes, or preservatives  -pregnant or trying to get pregnant  -breast-feeding  How should I use this medicine?  Take this medicine by mouth. Take it with meals. Swallow the tablets with a drink of water. Follow the directions on the prescription label. Take your medicine at regular intervals. Do not take your medicine more often than directed.  Talk to  your pediatrician regarding the use of this medicine in children. While this drug may be prescribed for children as young as 10 years of age for selected conditions, precautions do apply.  Overdosage: If you think you have taken too much of this medicine contact a poison control center or emergency room at once.  NOTE: This medicine is only for you. Do not share this medicine with others.  What if I miss a dose?  If you miss a dose, take it as soon as you can. If it is almost time for your next dose, take only that dose. Do not take double or extra doses.  What may interact with this medicine?  Do not take this medicine with any of the following medications:  -dofetilide  -gatifloxacin  -certain contrast medicines given before X-rays, CT scans, MRI, or other procedures  This medicine may also interact with the following medications:  -acetazolamide  -certain medicines for HIV infection or hepatitis, like adefovir, emtricitabine, entecavir, lamivudine, or tenofovir  -cimetidine  -crizotinib  -digoxin  -diuretics  -female hormones, like estrogens or progestins and birth control pills  -glycopyrrolate  -isoniazid  -lamotrigine  -medicines for blood pressure, heart disease, irregular heart beat  -memantine  -midodrine  -methazolamide  -morphine  -nicotinic acid  -phenothiazines like chlorpromazine, mesoridazine, prochlorperazine, thioridazine  -phenytoin  -procainamide  -propantheline  -quinidine  -quinine  -ranitidine  -ranolazine  -steroid medicines like prednisone or cortisone  -stimulant medicines for attention disorders, weight loss, or to stay awake  -thyroid medicines  -topiramate  -trimethoprim  -trospium  -vancomycin  -vandetanib  -zonisamide  This list may not describe all possible interactions. Give your health care provider a list of all the medicines, herbs, non-prescription drugs, or dietary supplements you use. Also tell them if you smoke, drink alcohol, or use illegal drugs. Some items may interact with  your medicine.  What should I watch for while using this medicine?  Visit your doctor or health care professional for regular checks on your progress.  A test called the HbA1C (A1C) will be monitored. This is a simple blood test. It measures your blood sugar control over the last 2 to 3 months. You will receive this test every 3 to 6 months.  Learn how to check your blood sugar. Learn the symptoms of low and high blood sugar and how to manage them.  Always carry a quick-source of sugar with you in case you have symptoms of low blood sugar. Examples include hard sugar candy or glucose tablets. Make sure others know that you can choke if you eat or drink when you develop serious symptoms of low blood sugar, such as seizures or unconsciousness. They must get medical help at once.  Tell your doctor or health care professional if you have high blood sugar. You might need to change the dose of your medicine. If you are sick or exercising more than usual, you might need to change the dose of your medicine.  Do not skip meals. Ask your doctor or health care professional if you should avoid alcohol. Many nonprescription cough and cold products contain sugar or alcohol. These can affect blood sugar.  This medicine may cause ovulation in premenopausal women who do not have regular monthly periods. This may increase your chances of becoming pregnant. You should not take this medicine if you become pregnant or think you may be pregnant. Talk with your doctor or health care professional about your birth control options while taking this medicine. Contact your doctor or health care professional right away if think you are pregnant.  If you are going to need surgery, a MRI, CT scan, or other procedure, tell your doctor that you are taking this medicine. You may need to stop taking this medicine before the procedure.  Wear a medical ID bracelet or chain, and carry a card that describes your disease and details of your medicine and  dosage times.  What side effects may I notice from receiving this medicine?  Side effects that you should report to your doctor or health care professional as soon as possible:  -allergic reactions like skin rash, itching or hives, swelling of the face, lips, or tongue  -breathing problems  -feeling faint or lightheaded, falls  -muscle aches or pains  -signs and symptoms of low blood sugar such as feeling anxious, confusion, dizziness, increased hunger, unusually weak or tired, sweating, shakiness, cold, irritable, headache, blurred vision, fast heartbeat, loss of consciousness  -slow or irregular heartbeat  -unusual stomach pain or discomfort  -unusually tired or weak  Side effects that usually do not require medical attention (report to your doctor or health care professional if they continue or are bothersome):  -diarrhea  -headache  -heartburn  -metallic taste in mouth  -nausea  -stomach gas, upset  This list may not describe all possible side effects. Call your doctor for medical advice about side effects. You may report side effects to FDA at 6-287-FDA-5739.  Where should I keep my medicine?  Keep out of the reach of children.  Store at room temperature between 15 and 30 degrees C (59 and 86 degrees F). Protect from moisture and light. Throw away any unused medicine after the expiration date.  NOTE: This sheet is a summary. It may not cover all possible information. If you have questions about this medicine, talk to your doctor, pharmacist, or health care provider.  © 2018 [employer] Standard (2015-06-02 22:14:40)    Gabapentin capsules or tablets  What is this medicine?  GABAPENTIN (GA ba pen tin) is used to control partial seizures in adults with epilepsy. It is also used to treat certain types of nerve pain.  This medicine may be used for other purposes; ask your health care provider or pharmacist if you have questions.  COMMON BRAND NAME(S): Active-PAC with Gabapentin, Gabarone, Neurontin  What should I  tell my health care provider before I take this medicine?  They need to know if you have any of these conditions:  -kidney disease  -suicidal thoughts, plans, or attempt; a previous suicide attempt by you or a family member  -an unusual or allergic reaction to gabapentin, other medicines, foods, dyes, or preservatives  -pregnant or trying to get pregnant  -breast-feeding  How should I use this medicine?  Take this medicine by mouth with a glass of water. Follow the directions on the prescription label. You can take it with or without food. If it upsets your stomach, take it with food.Take your medicine at regular intervals. Do not take it more often than directed. Do not stop taking except on your doctor's advice.  If you are directed to break the 600 or 800 mg tablets in half as part of your dose, the extra half tablet should be used for the next dose. If you have not used the extra half tablet within 28 days, it should be thrown away.  A special MedGuide will be given to you by the pharmacist with each prescription and refill. Be sure to read this information carefully each time.  Talk to your pediatrician regarding the use of this medicine in children. Special care may be needed.  Overdosage: If you think you have taken too much of this medicine contact a poison control center or emergency room at once.  NOTE: This medicine is only for you. Do not share this medicine with others.  What if I miss a dose?  If you miss a dose, take it as soon as you can. If it is almost time for your next dose, take only that dose. Do not take double or extra doses.  What may interact with this medicine?  Do not take this medicine with any of the following medications:  -other gabapentin products  This medicine may also interact with the following medications:  -alcohol  -antacids  -antihistamines for allergy, cough and cold  -certain medicines for anxiety or sleep  -certain medicines for depression or psychotic  disturbances  -homatropine; hydrocodone  -naproxen  -narcotic medicines (opiates) for pain  -phenothiazines like chlorpromazine, mesoridazine, prochlorperazine, thioridazine  This list may not describe all possible interactions. Give your health care provider a list of all the medicines, herbs, non-prescription drugs, or dietary supplements you use. Also tell them if you smoke, drink alcohol, or use illegal drugs. Some items may interact with your medicine.  What should I watch for while using this medicine?  Visit your doctor or health care professional for regular checks on your progress. You may want to keep a record at home of how you feel your condition is responding to treatment. You may want to share this information with your doctor or health care professional at each visit. You should contact your doctor or health care professional if your seizures get worse or if you have any new types of seizures. Do not stop taking this medicine or any of your seizure medicines unless instructed by your doctor or health care professional. Stopping your medicine suddenly can increase your seizures or their severity.  Wear a medical identification bracelet or chain if you are taking this medicine for seizures, and carry a card that lists all your medications.  You may get drowsy, dizzy, or have blurred vision. Do not drive, use machinery, or do anything that needs mental alertness until you know how this medicine affects you. To reduce dizzy or fainting spells, do not sit or stand up quickly, especially if you are an older patient. Alcohol can increase drowsiness and dizziness. Avoid alcoholic drinks.  Your mouth may get dry. Chewing sugarless gum or sucking hard candy, and drinking plenty of water will help.  The use of this medicine may increase the chance of suicidal thoughts or actions. Pay special attention to how you are responding while on this medicine. Any worsening of mood, or thoughts of suicide or dying should  be reported to your health care professional right away.  Women who become pregnant while using this medicine may enroll in the [place] Antiepileptic Drug Pregnancy Registry by calling 1-370.994.5556. This registry collects information about the safety of antiepileptic drug use during pregnancy.  What side effects may I notice from receiving this medicine?  Side effects that you should report to your doctor or health care professional as soon as possible:  -allergic reactions like skin rash, itching or hives, swelling of the face, lips, or tongue  -worsening of mood, thoughts or actions of suicide or dying  Side effects that usually do not require medical attention (report to your doctor or health care professional if they continue or are bothersome):  -constipation  -difficulty walking or controlling muscle movements  -dizziness  -nausea  -slurred speech  -tiredness  -tremors  -weight gain  This list may not describe all possible side effects. Call your doctor for medical advice about side effects. You may report side effects to FDA at 6-221-MHE-3665.  Where should I keep my medicine?  Keep out of reach of children.  This medicine may cause accidental overdose and death if it taken by other adults, children, or pets. Mix any unused medicine with a substance like cat litter or coffee grounds. Then throw the medicine away in a sealed container like a sealed bag or a coffee can with a lid. Do not use the medicine after the expiration date.  Store at room temperature between 15 and 30 degrees C (59 and 86 degrees F).  NOTE: This sheet is a summary. It may not cover all possible information. If you have questions about this medicine, talk to your doctor, pharmacist, or health care provider.  © 2018 [employer] Standard (2015-02-13 15:26:50)    Quetiapine tablets  What is this medicine?  QUETIAPINE (kwcas GARGE a lonnie) is an antipsychotic. It is used to treat schizophrenia and bipolar disorder, also known as  manic-depression.  This medicine may be used for other purposes; ask your health care provider or pharmacist if you have questions.  COMMON BRAND NAME(S): Seroquel  What should I tell my health care provider before I take this medicine?  They need to know if you have any of these conditions:  -brain tumor or head injury  -breast cancer  -cataracts  -diabetes  -difficulty swallowing  -heart disease  -kidney disease  -liver disease  -low blood counts, like low white cell, platelet, or red cell counts  -low blood pressure or dizziness when standing up  -Parkinson's disease  -previous heart attack  -seizures  -suicidal thoughts, plans, or attempt by you or a family member  -thyroid disease  -an unusual or allergic reaction to quetiapine, other medicines, foods, dyes, or preservatives  -pregnant or trying to get pregnant  -breast-feeding  How should I use this medicine?  Take this medicine by mouth. Swallow it with a drink of water. Follow the directions on the prescription label. If it upsets your stomach you can take it with food. Take your medicine at regular intervals. Do not take it more often than directed. Do not stop taking except on the advice of your doctor or health care professional.  A special MedGuide will be given to you by the pharmacist with each prescription and refill. Be sure to read this information carefully each time.  Talk to your pediatrician regarding the use of this medicine in children. While this drug may be prescribed for children as young as 10 years for selected conditions, precautions do apply.  Patients over age 65 years may have a stronger reaction to this medicine and need smaller doses.  Overdosage: If you think you have taken too much of this medicine contact a poison control center or emergency room at once.  NOTE: This medicine is only for you. Do not share this medicine with others.  What if I miss a dose?  If you miss a dose, take it as soon as you can. If it is almost time for  your next dose, take only that dose. Do not take double or extra doses.  What may interact with this medicine?  Do not take this medicine with any of the following medications:  -certain medicines for fungal infections like fluconazole, itraconazole, ketoconazole, posaconazole, voriconazole  -cisapride  -dofetilide  -dronedarone  -droperidol  -grepafloxacin  -halofantrine  -phenothiazines like chlorpromazine, mesoridazine, thioridazine  -pimozide  -sparfloxacin  -ziprasidone  This medicine may also interact with the following medications:  -alcohol  -antiviral medicines for HIV or AIDS  -certain medicines for blood pressure  -certain medicines for depression, anxiety, or psychotic disturbances like haloperidol, lorazepam  -certain medicines for diabetes  -certain medicines for Parkinson's disease  -certain medicines for seizures like carbamazepine, phenobarbital, phenytoin  -cimetidine  -erythromycin  -other medicines that prolong the QT interval (cause an abnormal heart rhythm)  -rifampin  -steroid medicines like prednisone or cortisone  This list may not describe all possible interactions. Give your health care provider a list of all the medicines, herbs, non-prescription drugs, or dietary supplements you use. Also tell them if you smoke, drink alcohol, or use illegal drugs. Some items may interact with your medicine.  What should I watch for while using this medicine?  Visit your doctor or health care professional for regular checks on your progress. It may be several weeks before you see the full effects of this medicine.  Your health care provider may suggest that you have your eyes examined prior to starting this medicine, and every 6 months thereafter.  If you have been taking this medicine regularly for some time, do not suddenly stop taking it. You must gradually reduce the dose or your symptoms may get worse. Ask your doctor or health care professional for advice.  Patients and their families should watch  out for worsening depression or thoughts of suicide. Also watch out for sudden or severe changes in feelings such as feeling anxious, agitated, panicky, irritable, hostile, aggressive, impulsive, severely restless, overly excited and hyperactive, or not being able to sleep. If this happens, especially at the beginning of antidepressant treatment or after a change in dose, call your health care professional.  You may get dizzy or drowsy. Do not drive, use machinery, or do anything that needs mental alertness until you know how this medicine affects you. Do not stand or sit up quickly, especially if you are an older patient. This reduces the risk of dizzy or fainting spells. Alcohol can increase dizziness and drowsiness. Avoid alcoholic drinks.  Do not treat yourself for colds, diarrhea or allergies. Ask your doctor or health care professional for advice, some ingredients may increase possible side effects.  This medicine can reduce the response of your body to heat or cold. Dress warm in cold weather and stay hydrated in hot weather. If possible, avoid extreme temperatures like saunas, hot tubs, very hot or cold showers, or activities that can cause dehydration such as vigorous exercise.  What side effects may I notice from receiving this medicine?  Side effects that you should report to your doctor or health care professional as soon as possible:  -allergic reactions like skin rash, itching or hives, swelling of the face, lips, or tongue  -difficulty swallowing  -fast or irregular heartbeat  -fever or chills, sore throat  -fever with rash, swollen lymph nodes, or swelling of the face  -increased hunger or thirst  -increased urination  -problems with balance, talking, walking  -seizures  -stiff muscles  -suicidal thoughts or other mood changes  -uncontrollable head, mouth, neck, arm, or leg movements  -unusually weak or tired  Side effects that usually do not require medical attention (report to your doctor or health  care professional if they continue or are bothersome):  -change in sex drive or performance  -constipation  -drowsy or dizzy  -dry mouth  -stomach upset  -weight gain  This list may not describe all possible side effects. Call your doctor for medical advice about side effects. You may report side effects to FDA at 9-811-FDA-7815.  Where should I keep my medicine?  Keep out of the reach of children.  Store at room temperature between 15 and 30 degrees C (59 and 86 degrees F). Throw away any unused medicine after the expiration date.  NOTE: This sheet is a summary. It may not cover all possible information. If you have questions about this medicine, talk to your doctor, pharmacist, or health care provider.  © 2018 [employer] (2016-06-21 13:07:35)    Duloxetine delayed-release capsules  What is this medicine?  DULOXETINE (doo LOX e teen) is used to treat depression, anxiety, and different types of chronic pain.  This medicine may be used for other purposes; ask your health care provider or pharmacist if you have questions.  COMMON BRAND NAME(S): Robertochuy Sharif  What should I tell my health care provider before I take this medicine?  They need to know if you have any of these conditions:  -bipolar disorder or a family history of bipolar disorder  -glaucoma  -kidney disease  -liver disease  -suicidal thoughts or a previous suicide attempt  -taken medicines called MAOIs like Carbex, Eldepryl, Marplan, Nardil, and Parnate within 14 days  -an unusual reaction to duloxetine, other medicines, foods, dyes, or preservatives  -pregnant or trying to get pregnant  -breast-feeding  How should I use this medicine?  Take this medicine by mouth with a glass of water. Follow the directions on the prescription label. Do not cut, crush or chew this medicine. You can take this medicine with or without food. Take your medicine at regular intervals. Do not take your medicine more often than directed. Do not stop taking this  medicine suddenly except upon the advice of your doctor. Stopping this medicine too quickly may cause serious side effects or your condition may worsen.  A special MedGuide will be given to you by the pharmacist with each prescription and refill. Be sure to read this information carefully each time.  Talk to your pediatrician regarding the use of this medicine in children. While this drug may be prescribed for children as young as 7 years of age for selected conditions, precautions do apply.  Overdosage: If you think you have taken too much of this medicine contact a poison control center or emergency room at once.  NOTE: This medicine is only for you. Do not share this medicine with others.  What if I miss a dose?  If you miss a dose, take it as soon as you can. If it is almost time for your next dose, take only that dose. Do not take double or extra doses.  What may interact with this medicine?  Do not take this medicine with any of the following medications:  -desvenlafaxine  -levomilnacipran  -linezolid  -MAOIs like Carbex, Eldepryl, Marplan, Nardil, and Parnate  -methylene blue (injected into a vein)  -milnacipran  -thioridazine  -venlafaxine  This medicine may also interact with the following medications:  -alcohol  -amphetamines  -aspirin and aspirin-like medicines  -certain antibiotics like ciprofloxacin and enoxacin  -certain medicines for blood pressure, heart disease, irregular heart beat  -certain medicines for depression, anxiety, or psychotic disturbances  -certain medicines for migraine headache like almotriptan, eletriptan, frovatriptan, naratriptan, rizatriptan, sumatriptan, zolmitriptan  -certain medicines that treat or prevent blood clots like warfarin, enoxaparin, and dalteparin  -cimetidine  -fentanyl  -lithium  -NSAIDS, medicines for pain and inflammation, like ibuprofen or naproxen  -phentermine  -procarbazine  -rasagiline  -sibutramine  -Ta's  wort  -theophylline  -tramadol  -tryptophan  This list may not describe all possible interactions. Give your health care provider a list of all the medicines, herbs, non-prescription drugs, or dietary supplements you use. Also tell them if you smoke, drink alcohol, or use illegal drugs. Some items may interact with your medicine.  What should I watch for while using this medicine?  Tell your doctor if your symptoms do not get better or if they get worse. Visit your doctor or health care professional for regular checks on your progress. Because it may take several weeks to see the full effects of this medicine, it is important to continue your treatment as prescribed by your doctor.  Patients and their families should watch out for new or worsening thoughts of suicide or depression. Also watch out for sudden changes in feelings such as feeling anxious, agitated, panicky, irritable, hostile, aggressive, impulsive, severely restless, overly excited and hyperactive, or not being able to sleep. If this happens, especially at the beginning of treatment or after a change in dose, call your health care professional.  You may get drowsy or dizzy. Do not drive, use machinery, or do anything that needs mental alertness until you know how this medicine affects you. Do not stand or sit up quickly, especially if you are an older patient. This reduces the risk of dizzy or fainting spells. Alcohol may interfere with the effect of this medicine. Avoid alcoholic drinks.  This medicine can cause an increase in blood pressure. This medicine can also cause a sudden drop in your blood pressure, which may make you feel faint and increase the chance of a fall. These effects are most common when you first start the medicine or when the dose is increased, or during use of other medicines that can cause a sudden drop in blood pressure. Check with your doctor for instructions on monitoring your blood pressure while taking this medicine.  Your  mouth may get dry. Chewing sugarless gum or sucking hard candy, and drinking plenty of water may help. Contact your doctor if the problem does not go away or is severe.  What side effects may I notice from receiving this medicine?  Side effects that you should report to your doctor or health care professional as soon as possible:  -allergic reactions like skin rash, itching or hives, swelling of the face, lips, or tongue  -anxious  -breathing problems  -confusion  -changes in vision  -chest pain  -confusion  -elevated mood, decreased need for sleep, racing thoughts, impulsive behavior  -eye pain  -fast, irregular heartbeat  -feeling faint or lightheaded, falls  -feeling agitated, angry, or irritable  -hallucination, loss of contact with reality  -high blood pressure  -loss of balance or coordination  -palpitations  -redness, blistering, peeling or loosening of the skin, including inside the mouth  -restlessness, pacing, inability to keep still  -seizures  -stiff muscles  -suicidal thoughts or other mood changes  -trouble passing urine or change in the amount of urine  -trouble sleeping  -unusual bleeding or bruising  -unusually weak or tired  -vomiting  -yellowing of the eyes or skin  Side effects that usually do not require medical attention (report to your doctor or health care professional if they continue or are bothersome):  -change in sex drive or performance  -change in appetite or weight  -constipation  -dizziness  -dry mouth  -headache  -increased sweating  -nausea  -tired  This list may not describe all possible side effects. Call your doctor for medical advice about side effects. You may report side effects to FDA at 0-444-FDA-1940.  Where should I keep my medicine?  Keep out of the reach of children.  Store at room temperature between 20 and 25 degrees C (68 to 77 degrees F). Throw away any unused medicine after the expiration date.  NOTE: This sheet is a summary. It may not cover all possible  information. If you have questions about this medicine, talk to your doctor, pharmacist, or health care provider.  © 2018 [employer] (2017-05-18 18:16:03)

## 2019-05-03 NOTE — CARE PLAN
Problem: Infection  Goal: Will remain free from infection  Outcome: PROGRESSING AS EXPECTED      Problem: Discharge Barriers/Planning  Goal: Patient's continuum of care needs will be met  Outcome: PROGRESSING AS EXPECTED

## 2019-05-03 NOTE — PROGRESS NOTES
MONITOR SUMMARY    SR [id number]    1.5 SECOND PAUSE  1.2 SECOND PAUSE     [address] AV BLOCK     .26/.12/.36

## 2019-05-03 NOTE — DISCHARGE SUMMARY
Discharge Summary    CHIEF COMPLAINT ON ADMISSION  Chief Complaint   Patient presents with   • ETOH Intoxication   • Fall   • Atrial Fibrillation       Reason for Admission  EMS     Admission Date  4/24/2019    CODE STATUS  Prior    HPI & HOSPITAL COURSE  This is a 64 y.o. male here with alcohol intoxication, status post fall with left chest pain and rib fracture noted on chest x-ray with multiple left rib fracture.  He was started on oxycodone as well as MS Contin for pain control.  He was noted to have mild left basilar infiltrate and atelectasis which has improved with incentive spirometer use.  Upon discharge she did not require any additional oxygen supplementation.  He was monitored for alcohol withdrawal with resolution of symptoms.  He reports that he will follow-up with outpatient rehab program for alcohol cessation, he [facility].    Patient is now ambulatory with minimal assistance.  He will discharged home with patient's wife at bedside.    He was found to have atrial fibrillation, with a new diagnosis with rapid ventricular rate.  Heart rate has been controlled on beta-blockers.  He is not a candidate for full dose anticoagulation given history of alcohol abuse and frequent falls.  He has been started on full dose aspirin.  He will need outpatient follow-up with cardiology.  He we have advised patient to follow-up with his primary care provider.   Echocardiogram within normal limits.  Lung nodule is noted on imaging.  He is advised to follow-up as an outpatient for further monitoring.    He does report a history of obstructive sleep apnea on home CPAP, appears to be at baseline.    Therefore, he is discharged in good and stable condition to home with close outpatient follow-up.    The patient met 2-midnight criteria for an inpatient stay at the time of discharge.    Discharge Date  5/3/2019    FOLLOW UP ITEMS POST DISCHARGE  Primary care provider/discharge  clinic in 1 week  [facility]    DISCHARGE DIAGNOSES  Principal Problem:    AF (atrial fibrillation) (HCC) POA: Unknown  Active Problems:    Alcoholic intoxication without complication (HCC) POA: Unknown    Acute hypoxemic respiratory failure (HCC) POA: Unknown    Transaminitis POA: Unknown    Increased anion gap metabolic acidosis POA: Unknown    Closed fracture of multiple ribs of left side with routine healing POA: Unknown    Troponin level elevated POA: Unknown    PHILLY on CPAP POA: Unknown    Obesity POA: Yes    Type 2 diabetes mellitus with diabetic polyneuropathy, without long-term current use of insulin (HCC) POA: Unknown    Essential hypertension POA: Unknown    Hypokalemia POA: Unknown    Lung nodule POA: Unknown    Hypomagnesemia POA: Unknown    Hypocalcemia POA: Unknown    Conjunctivitis POA: Unknown  Resolved Problems:    * No resolved hospital problems. *      FOLLOW UP  No future appointments.  Jennifer Hart M.D.  [address] [address]  [phone]    On 5/20/2019  PLease check in at 1pm for your appointment at 120pm thank you       MEDICATIONS ON DISCHARGE     Medication List      START taking these medications      Instructions   allopurinol 100 MG Tabs  Commonly known as:  ZYLOPRIM   Take 2 Tabs by mouth every day.  Dose:  200 mg     amLODIPine 5 MG Tabs  Commonly known as:  NORVASC   Take 1 Tab by mouth every day.  Dose:  5 mg     aspirin 325 MG Tabs  Commonly known as:  ASA   Take 1 Tab by mouth every day.  Dose:  325 mg     calcium carbonate 1250 (500 Ca) MG Tabs  Commonly known as:  OS-FAROOQ 500   Take 1 Tab by mouth 3 times a day, with meals.  Dose:  500 mg     folic acid 1 MG Tabs  Commonly known as:  FOLVITE   Take 1 Tab by mouth every day.  Dose:  1 mg     ibuprofen 600 MG Tabs  Commonly known as:  MOTRIN   Take 1 Tab by mouth every 6 hours as needed.  Dose:  600 mg     lisinopril 20 MG Tabs  Commonly known as:   PRINIVIL   Take 1 Tab by mouth every day.  Dose:  20 mg     magnesium oxide 400 (241.3 Mg) MG Tabs tablet  Commonly known as:  MAG-OX   Take 1 Tab by mouth 2 Times a Day.  Dose:  400 mg     metoprolol 100 MG Tabs  Commonly known as:  LOPRESSOR   Take 1 Tab by mouth 2 Times a Day.  Dose:  100 mg     multivitamin Tabs   Take 1 Tab by mouth every day.  Dose:  1 Tab     senna-docusate 8.6-50 MG Tabs  Commonly known as:  PERICOLACE or SENOKOT S   Take 2 Tabs by mouth 2 Times a Day.  Dose:  2 Tab     thiamine 100 MG tablet  Commonly known as:  THIAMINE   Take 1 Tab by mouth every day.  Dose:  100 mg        CONTINUE taking these medications      Instructions   DULoxetine 30 MG Cpep  Commonly known as:  CYMBALTA   Take 2 Caps by mouth every day.  Dose:  60 mg     gabapentin 300 MG Caps  Commonly known as:  NEURONTIN   Take 900 mg by mouth 3 times a day.  Dose:  900 mg     metformin 1000 MG tablet  Commonly known as:  GLUCOPHAGE   Take 1,000 mg by mouth 2 times a day, with meals.  Dose:  1000 mg     QUEtiapine 100 MG Tabs  Commonly known as:  SEROQUEL   Take 100 mg by mouth every bedtime.  Dose:  100 mg            Allergies  No Known Allergies    DIET  No orders of the defined types were placed in this encounter.      ACTIVITY  As tolerated.  Weight bearing as tolerated    CONSULTATIONS  Surgery    PROCEDURES  None    LABORATORY  Lab Results   Component Value Date    SODIUM 135 05/03/2019    POTASSIUM 3.6 05/03/2019    CHLORIDE 95 (L) 05/03/2019    CO2 30 05/03/2019    GLUCOSE 100 (H) 05/03/2019    BUN 11 05/03/2019    CREATININE 0.80 05/03/2019        Lab Results   Component Value Date    WBC 9.4 04/26/2019    HEMOGLOBIN 12.2 (L) 04/26/2019    HEMATOCRIT 37.7 (L) 04/26/2019    PLATELETCT 156 (L) 04/26/2019        Total time of the discharge process exceeds 45 minutes.

## 2019-05-03 NOTE — CARE PLAN
Problem: Infection  Goal: Will remain free from infection  Outcome: PROGRESSING AS EXPECTED  Pt educated on the importance of hand washing to reduce the risk of infection. Pt verbally understands and performs hand hygiene to reduce to risks of infection.     Problem: Bowel/Gastric:  Goal: Normal bowel function is maintained or improved  Outcome: PROGRESSING AS EXPECTED  Pt educated on the importance of hand washing to reduce the risk of infection. Pt verbally understands and performs hand hygiene to reduce to risks of infection.

## 2019-05-03 NOTE — PROGRESS NOTES
Bedside report received from NOC ALEXANDRE Hamilton, pt is resting in bed, easily woken with no report of pain. Bed is locked in lowest position with call light, belongings within reach, white board updated, and POC discussed. All needs met at this time.

## 2019-05-03 NOTE — PROGRESS NOTES
Pt is discharged home, leaving the unit with is hospital escort via WC and his spouse. Pt verbally acknowledges all discharge instructions, medications, and medications regimen. Pt gathered all personal belongings, Tele box and IV have been removed. All questions and needs have been met at this time.

## 2019-05-03 NOTE — PROGRESS NOTES
Assumed care at 1900. Received report from ALEXANDRE Greenfield. First assessment completed, call light within reach. All questions asked. Will continue to monitor.

## 2019-05-14 NOTE — DOCUMENTATION QUERY
[facility]                                                                       Query Response Note      PATIENT:               LISA MCGEE  ACCT #:                  [id number]  MRN:                     [id number]  :                      1955  ADMIT DATE:       2019 10:18 AM  DISCH DATE:        5/3/2019 10:45 AM  RESPONDING  PROVIDER #:        [id number]           QUERY TEXT:    It is unclear if the patient went through withdrawal.    Based on clinical findings, risk factors and treatment, can the patient's alcohol dependence be further clarified as    NOTE:  If an appropriate response is not listed below, please respond with a new note.    The patient's Clinical Indicators include:  Alcoholic intoxication without complication (HCC)  Assessment & Plan    Withdrawal symptoms resolving.  Scheduled Neurontin  Thiamine, folate, multivitamin  Diet as tolerated  Continue PT/OT  As needed Ativan for anxiety      not in withdrawal  States that he would like to participate in rehab programs  [facility]    Admit orders and Discharge Summary-alcohol intoxication without complication    Query created by: Lola Banuelos on 2019 10:03 AM    RESPONSE TEXT:    Alcohol dependence with intoxication, uncomplicated          Electronically signed by:  MARK HITCHCOCK DO 2019 8:31 AM

## 2020-12-06 ENCOUNTER — HOSPITAL ENCOUNTER (EMERGENCY)
Facility: MEDICAL CENTER | Age: 65
End: 2020-12-06
Attending: EMERGENCY MEDICINE
Payer: OTHER GOVERNMENT

## 2020-12-06 ENCOUNTER — APPOINTMENT (OUTPATIENT)
Dept: RADIOLOGY | Facility: MEDICAL CENTER | Age: 65
End: 2020-12-06
Attending: EMERGENCY MEDICINE
Payer: OTHER GOVERNMENT

## 2020-12-06 ENCOUNTER — APPOINTMENT (OUTPATIENT)
Dept: RADIOLOGY | Facility: MEDICAL CENTER | Age: 65
End: 2020-12-06
Payer: OTHER GOVERNMENT

## 2020-12-06 VITALS
TEMPERATURE: 97.1 F | WEIGHT: 265 LBS | OXYGEN SATURATION: 92 % | SYSTOLIC BLOOD PRESSURE: 147 MMHG | RESPIRATION RATE: 27 BRPM | DIASTOLIC BLOOD PRESSURE: 88 MMHG | HEART RATE: 120 BPM

## 2020-12-06 DIAGNOSIS — R41.82 ALTERED MENTAL STATUS, UNSPECIFIED ALTERED MENTAL STATUS TYPE: ICD-10-CM

## 2020-12-06 DIAGNOSIS — E87.20 LACTIC ACIDOSIS: ICD-10-CM

## 2020-12-06 DIAGNOSIS — F10.10 ALCOHOL ABUSE: ICD-10-CM

## 2020-12-06 DIAGNOSIS — R00.0 TACHYCARDIA: ICD-10-CM

## 2020-12-06 LAB
ABO + RH BLD: NORMAL
ABO GROUP BLD: NORMAL
ALBUMIN SERPL BCP-MCNC: 4.7 G/DL (ref 3.2–4.9)
ALBUMIN/GLOB SERPL: 1.4 G/DL
ALP SERPL-CCNC: 78 U/L (ref 30–99)
ALT SERPL-CCNC: 22 U/L (ref 2–50)
ANION GAP SERPL CALC-SCNC: 20 MMOL/L (ref 7–16)
APTT PPP: 30 SEC (ref 24.7–36)
AST SERPL-CCNC: 27 U/L (ref 12–45)
BASOPHILS # BLD AUTO: 0.3 % (ref 0–1.8)
BASOPHILS # BLD: 0.07 K/UL (ref 0–0.12)
BILIRUB SERPL-MCNC: 0.3 MG/DL (ref 0.1–1.5)
BLD GP AB SCN SERPL QL: NORMAL
BUN SERPL-MCNC: 8 MG/DL (ref 8–22)
CALCIUM SERPL-MCNC: 10.9 MG/DL (ref 8.5–10.5)
CHLORIDE SERPL-SCNC: 106 MMOL/L (ref 96–112)
CK SERPL-CCNC: 1574 U/L (ref 0–154)
CO2 SERPL-SCNC: 18 MMOL/L (ref 20–33)
CREAT SERPL-MCNC: 0.87 MG/DL (ref 0.5–1.4)
EKG IMPRESSION: NORMAL
EOSINOPHIL # BLD AUTO: 0 K/UL (ref 0–0.51)
EOSINOPHIL NFR BLD: 0 % (ref 0–6.9)
ERYTHROCYTE [DISTWIDTH] IN BLOOD BY AUTOMATED COUNT: 44.3 FL (ref 35.9–50)
ERYTHROCYTE [DISTWIDTH] IN BLOOD BY AUTOMATED COUNT: 44.6 FL (ref 35.9–50)
ETHANOL BLD-MCNC: 271.5 MG/DL (ref 0–10)
GLOBULIN SER CALC-MCNC: 3.3 G/DL (ref 1.9–3.5)
GLUCOSE SERPL-MCNC: 259 MG/DL (ref 65–99)
HCT VFR BLD AUTO: 45.2 % (ref 42–52)
HCT VFR BLD AUTO: 46.7 % (ref 42–52)
HGB BLD-MCNC: 15.3 G/DL (ref 14–18)
HGB BLD-MCNC: 15.7 G/DL (ref 14–18)
IMM GRANULOCYTES # BLD AUTO: 0.21 K/UL (ref 0–0.11)
IMM GRANULOCYTES NFR BLD AUTO: 0.9 % (ref 0–0.9)
INR PPP: 1.04 (ref 0.87–1.13)
LACTATE BLD-SCNC: 4.8 MMOL/L (ref 0.5–2)
LYMPHOCYTES # BLD AUTO: 1.09 K/UL (ref 1–4.8)
LYMPHOCYTES NFR BLD: 4.7 % (ref 22–41)
MCH RBC QN AUTO: 29.5 PG (ref 27–33)
MCH RBC QN AUTO: 29.7 PG (ref 27–33)
MCHC RBC AUTO-ENTMCNC: 33.6 G/DL (ref 33.7–35.3)
MCHC RBC AUTO-ENTMCNC: 33.8 G/DL (ref 33.7–35.3)
MCV RBC AUTO: 87.6 FL (ref 81.4–97.8)
MCV RBC AUTO: 87.8 FL (ref 81.4–97.8)
MONOCYTES # BLD AUTO: 1.55 K/UL (ref 0–0.85)
MONOCYTES NFR BLD AUTO: 6.7 % (ref 0–13.4)
NEUTROPHILS # BLD AUTO: 20.07 K/UL (ref 1.82–7.42)
NEUTROPHILS NFR BLD: 87.4 % (ref 44–72)
NRBC # BLD AUTO: 0 K/UL
NRBC BLD-RTO: 0 /100 WBC
PLATELET # BLD AUTO: 418 K/UL (ref 164–446)
PLATELET # BLD AUTO: 471 K/UL (ref 164–446)
PMV BLD AUTO: 9.6 FL (ref 9–12.9)
PMV BLD AUTO: 9.7 FL (ref 9–12.9)
POTASSIUM SERPL-SCNC: 4 MMOL/L (ref 3.6–5.5)
PROT SERPL-MCNC: 8 G/DL (ref 6–8.2)
PROTHROMBIN TIME: 14 SEC (ref 12–14.6)
RBC # BLD AUTO: 5.16 M/UL (ref 4.7–6.1)
RBC # BLD AUTO: 5.32 M/UL (ref 4.7–6.1)
RH BLD: NORMAL
SODIUM SERPL-SCNC: 144 MMOL/L (ref 135–145)
TROPONIN T SERPL-MCNC: 46 NG/L (ref 6–19)
WBC # BLD AUTO: 17.4 K/UL (ref 4.8–10.8)
WBC # BLD AUTO: 23 K/UL (ref 4.8–10.8)

## 2020-12-06 PROCEDURE — 82550 ASSAY OF CK (CPK): CPT

## 2020-12-06 PROCEDURE — 86901 BLOOD TYPING SEROLOGIC RH(D): CPT

## 2020-12-06 PROCEDURE — 305949 HCHG RED TRAUMA ACT PRE-NOTIFY NO CC

## 2020-12-06 PROCEDURE — 80307 DRUG TEST PRSMV CHEM ANLYZR: CPT

## 2020-12-06 PROCEDURE — 85027 COMPLETE CBC AUTOMATED: CPT | Mod: XU

## 2020-12-06 PROCEDURE — 86900 BLOOD TYPING SEROLOGIC ABO: CPT

## 2020-12-06 PROCEDURE — 93005 ELECTROCARDIOGRAM TRACING: CPT | Performed by: EMERGENCY MEDICINE

## 2020-12-06 PROCEDURE — 85610 PROTHROMBIN TIME: CPT

## 2020-12-06 PROCEDURE — C9803 HOPD COVID-19 SPEC COLLECT: HCPCS | Performed by: EMERGENCY MEDICINE

## 2020-12-06 PROCEDURE — 84484 ASSAY OF TROPONIN QUANT: CPT

## 2020-12-06 PROCEDURE — 700117 HCHG RX CONTRAST REV CODE 255: Performed by: EMERGENCY MEDICINE

## 2020-12-06 PROCEDURE — 700105 HCHG RX REV CODE 258: Performed by: SURGERY

## 2020-12-06 PROCEDURE — 72131 CT LUMBAR SPINE W/O DYE: CPT

## 2020-12-06 PROCEDURE — 80053 COMPREHEN METABOLIC PANEL: CPT

## 2020-12-06 PROCEDURE — 85025 COMPLETE CBC W/AUTO DIFF WBC: CPT

## 2020-12-06 PROCEDURE — 86850 RBC ANTIBODY SCREEN: CPT

## 2020-12-06 PROCEDURE — 87040 BLOOD CULTURE FOR BACTERIA: CPT

## 2020-12-06 PROCEDURE — 99284 EMERGENCY DEPT VISIT MOD MDM: CPT

## 2020-12-06 PROCEDURE — 83605 ASSAY OF LACTIC ACID: CPT

## 2020-12-06 PROCEDURE — 71045 X-RAY EXAM CHEST 1 VIEW: CPT

## 2020-12-06 PROCEDURE — 72125 CT NECK SPINE W/O DYE: CPT

## 2020-12-06 PROCEDURE — 74177 CT ABD & PELVIS W/CONTRAST: CPT

## 2020-12-06 PROCEDURE — 94760 N-INVAS EAR/PLS OXIMETRY 1: CPT

## 2020-12-06 PROCEDURE — 72128 CT CHEST SPINE W/O DYE: CPT

## 2020-12-06 PROCEDURE — 85730 THROMBOPLASTIN TIME PARTIAL: CPT

## 2020-12-06 PROCEDURE — 70450 CT HEAD/BRAIN W/O DYE: CPT

## 2020-12-06 RX ORDER — SODIUM CHLORIDE 9 MG/ML
INJECTION, SOLUTION INTRAVENOUS
Status: COMPLETED | OUTPATIENT
Start: 2020-12-06 | End: 2020-12-06

## 2020-12-06 RX ADMIN — IOHEXOL 100 ML: 350 INJECTION, SOLUTION INTRAVENOUS at 08:31

## 2020-12-06 RX ADMIN — SODIUM CHLORIDE 1000 ML: 9 INJECTION, SOLUTION INTRAVENOUS at 08:02

## 2020-12-06 RX ADMIN — SODIUM CHLORIDE: 9 INJECTION, SOLUTION INTRAVENOUS at 08:05

## 2020-12-06 ASSESSMENT — ENCOUNTER SYMPTOMS
CHILLS: 0
NECK PAIN: 1
SORE THROAT: 0
COUGH: 0
BACK PAIN: 0
FEVER: 0

## 2020-12-06 ASSESSMENT — FIBROSIS 4 INDEX: FIB4 SCORE: 0.88

## 2020-12-06 NOTE — DISCHARGE PLANNING
Trauma Response    Referral: Trauma Red Response    Intervention: SW responded to trauma red.  Pt was DAVIDE [facility]LEY after GLF.  Pt with reported GCS 12 upon arrival and A&Ox2.  Pts name is Jarek Gonzalez (: 1955).  SW obtained the following pt information: Per EMS, pt was found down in the bathtub for unknown amount of time and pt unresponsive when EMS arrived at his home. Pt also had a fall the night before and EMS responded on scene. CLARA was informed that pt's wife was at the home but she has no way of getting to the hospital. CLARA called pt's spouse, Kelle ([phone]), and provided updated that her  was here and going to imaging. Kelle states she can be reached at home for additional updates and will not be coming into the hospital.        Plan: SW will remain available as needed.

## 2020-12-06 NOTE — ED NOTES
Pt's wife at bedside, pt wanting to leave AMA, explained to pt that if he decided to leave, he would have to be able to ambulate per self and dress self. Pt and wife waiting to speak to ERP. IV fluids infusing. ERP aware of high lactic acid

## 2020-12-06 NOTE — FLOWSHEET NOTE
12/06/20 0804   Vital Signs   Pulse (!) 133   Respiration 16   Pulse Oximetry 93 %  (2 L NC)   $ Pulse Oximetry (Spot Check) Yes   Oxygen   O2 (LPM) 2   O2 Delivery Device Silicone Nasal Cannula       Trauma Red

## 2020-12-06 NOTE — ED PROVIDER NOTES
ED Provider Note    Scribed for Prince Gupta M.D. by Mehul [place]. 12/6/2020, 8:06 AM.    Primary care provider: No primary care provider noted  Means of arrival: EMS  History obtained from: EMS and patient  History limited by: None    CHIEF COMPLAINT  Trauma red      HPI  Jarek Gonzalez is a 65 y.o. male who presents to the Emergency Department as a trauma red. Per EMS, the patient was found after sustaining a ground level fall in the bathtub in his home and was unresponsive. EMT recognized what he believed to be hyperkalermia on the patient's EKG during the ride to the ED and administered albuterol and calcium. Per EMS, the patient has sustained several similar falls in the past including one last night but has typically been able to get up and ambulate afterwards prior to today. In the trauma bay today the patient is responsive to questioning, A&O x2 with GCS 15. He is complaining of neck pain but denies other symptoms of back pain, fever, chills, cough, sore throat, or chest pain.     PPE Note: I personally donned full PPE for all patient encounters during this visit, including being clean-shaven with an N95 respirator mask, gloves, gown, and goggles.     Scribe remained outside the patient's room and did not have any contact with the patient for the duration of patient encounter.     REVIEW OF SYSTEMS  Review of Systems   Constitutional: Negative for chills and fever.   HENT: Negative for sore throat.    Respiratory: Negative for cough.    Cardiovascular: Negative for chest pain.   Musculoskeletal: Positive for neck pain. Negative for back pain.   All other systems reviewed and are negative.      PAST MEDICAL HISTORY   None noted    SURGICAL HISTORY  patient denies any surgical history    SOCIAL HISTORY  Social History     Tobacco Use   • Smoking status: None noted   Substance Use Topics   • Alcohol use: None noted   • Drug use: None noted       Social History     Substance and Sexual Activity   Drug  Use None noted       FAMILY HISTORY  None noted    CURRENT MEDICATIONS  No current outpatient medications     ALLERGIES  No known allergies    PHYSICAL EXAM  VITAL SIGNS: /88   Pulse (!) 129   Temp 36.2 °C (97.1 °F)   Resp 16   SpO2 94%   Constitutional: Moderate distress, in full c-spine precautions.  HENT: Atraumatic.  Eyes: PERRL.  Neck: Atraumatic. Trachea is midline.  Cardiovascular: Regular rate and regular rhythm, no murmurs.  Thorax & Lungs: Normal breath sounds, no respiratory distress. Chest wall atraumatic.  Abdomen: Atraumatic, Soft, Non-tender.   Skin: No abrasions or lacerations.  Back: Atraumatic, No mid-line tenderness, no CVA tenderness.  Extremities: Atraumatic, no edema.  Vascular: Symmetric radial pulse.  Neurologic: Alert & oriented. Normal gross motor.     LABS  Labs Reviewed   DIAGNOSTIC ALCOHOL - Abnormal; Notable for the following components:       Result Value    Diagnostic Alcohol 271.5 (*)     All other components within normal limits   CBC WITHOUT DIFFERENTIAL - Abnormal; Notable for the following components:    WBC 17.4 (*)     MCHC 33.6 (*)     Platelet Count 471 (*)     All other components within normal limits   COMP METABOLIC PANEL - Abnormal; Notable for the following components:    Co2 18 (*)     Anion Gap 20.0 (*)     Glucose 259 (*)     Calcium 10.9 (*)     All other components within normal limits   CREATINE KINASE - Abnormal; Notable for the following components:    CPK Total 1574 (*)     All other components within normal limits   CBC WITH DIFFERENTIAL - Abnormal; Notable for the following components:    WBC 23.0 (*)     Neutrophils-Polys 87.40 (*)     Lymphocytes 4.70 (*)     Neutrophils (Absolute) 20.07 (*)     Monos (Absolute) 1.55 (*)     Immature Granulocytes (abs) 0.21 (*)     All other components within normal limits   LACTIC ACID - Abnormal; Notable for the following components:    Lactic Acid 4.8 (*)     All other components within normal limits   TROPONIN  "- Abnormal; Notable for the following components:    Troponin T 46 (*)     All other components within normal limits   PROTHROMBIN TIME   APTT   COD (ADULT)   COMPONENT CELLULAR   ABO RH CONFIRM   ESTIMATED GFR   BLOOD CULTURE    Narrative:     Per Hospital Policy: Only change Specimen Src: to \"Line\" if  specified by physician order.   BLOOD CULTURE    Narrative:     Per Hospital Policy: Only change Specimen Src: to \"Line\" if  specified by physician order.   COVID/SARS COV-2   URINALYSIS     All labs reviewed by me.    RADIOLOGY  CT-CHEST,ABDOMEN,PELVIS WITH   Final Result      1.  No posttraumatic abnormality identified in the chest, abdomen, or pelvis.      2.  Gastric sleeve or other gastric postoperative change.      3.  Cholecystectomy.      CT-LSPINE W/O PLUS RECONS   Final Result         1.  No acute lumbar compression fracture or subluxation.      2.  Bilateral facet arthropathy at L4-5 and L5-S1.      3.  Mild degenerative disc disease L2-3 through L5-S1.         CT-TSPINE W/O PLUS RECONS   Final Result         Negative CT scan of the thoracic spine without contrast.      CT-HEAD W/O   Final Result      1.  No acute intracranial process.      2.  Atrophy and small vessel ischemic change.      CT-CSPINE WITHOUT PLUS RECONS   Final Result         Negative CT scan of the cervical spine.  No fracture or subluxation.      DX-CHEST-LIMITED (1 VIEW)   Final Result      1.  Cardiomegaly.      2.  No pneumothorax identified.      3.  Increased interstitial opacity diffusely which represent interstitial edema, pneumonia, or interstitial fibrotic change.        The radiologist's interpretation of all radiological studies have been reviewed by me.    COURSE & MEDICAL DECISION MAKING  Pertinent Labs & Imaging studies reviewed. (See chart for details)     Review of his past medical record reveals he presented to the ED with alcohol intoxication, fall, and a-fib on 4/24/19.    8:06 AM - Patient seen and examined in the " trauma bay. Patient will be treated with NS bolus infusion. Ordered CT-head w/o, CT-CSpine w/o, CT-Chest, abdomen, pelvis w/, CT-LSpine w/o, CT-Tspine w/o, DX-Chest, Diagnostic alcohol, CBC w/o diff, CMP, Prothrombin time, APTT, Component Cellular, and COD adult to evaluate his symptoms. HYDRATION: Based on the patient's presentation of Tachycardia the patient was given IV fluids. IV Hydration was used because oral hydration was not adequate alone. Upon recheck following hydration, the patient was moderately less tachycardic.     8:50 AM - Ordered Creatine kinase to evaluate.    8:58 AM - Patient was reevaluated at bedside. Discussed lab and radiology results with the patient as well as their history of a-fib. I informed him he is currently intoxicated and tachycardic and will order further testing before he can be discharged. Ordered CBC w/ diff, UA, blood culture, Lactic acid and Troponin to evaluate.    11:06 AM - Patient was reevaluated at bedside. Discussed lab and radiology results with the patient and informed them that I recommend he stay in the hospital due to his abnormally high lactic acid and tachycardia that elevate his risk for kidney failure. I discussed the plan for hospitalization. The patient expressed his intent to leave against medical advice.    The patient is leaving against medical advice.  I discussed with the patient the risks of leaving without receiving appropriate care to include permanent disability or death.  I have discussed possible alternatives.  The patient is not intoxicated.  The patient is a capable decision maker and understands the risks and benefits of their decision.  While I certainly disagree with the patient's decision, I respect the patient's autonomy and will not keep them here against their will.  They understand that their decision to leave can be reversed at any time and they can return to us at any time for any reason at all.    DISPOSITION:  Patient will be  discharged home in stable condition.    FOLLOW UP:  No follow-up provider specified.    OUTPATIENT MEDICATIONS:  There are no discharge medications for this patient.      FINAL IMPRESSION  1. Altered mental status, unspecified altered mental status type    2. Alcohol abuse    3. Lactic acidosis    4. Tachycardia          Mehul HERRERA (Scribe), am scribing for, and in the presence of, Prince Gupta M.D..    Electronically signed by: Mehul [place] (Scribe), 12/6/2020    Prince HERRERA M.D. personally performed the services described in this documentation, as scribed by Mehul [place] in my presence, and it is both accurate and complete.    C.    The note accurately reflects work and decisions made by me.  Prince Gupta M.D.  12/6/2020  1:17 PM

## 2020-12-06 NOTE — ED NOTES
Pt pulling out IV's, ripping off C-Collar, C-Spine cleared by Dr. Gupta, pt urinated on floor, Pt instructed to stay in bed. Pt wanting leave. ERP at bedside to talk with pt

## 2020-12-06 NOTE — ED NOTES
Pt called this RN into room , pt asking if he will be going upstairs immediately to be placed in a room . This RN explained to pt that he had to be evaluated by an admitting doctor, and then placed to the appropriate unit. Pt states if he cannot be admitted immediately and have a bed upstairs he is signing out AMA.  ERP aware. Pt's wife agrees to take him home. Pt signed AMA

## 2020-12-06 NOTE — ED NOTES
65 year old male, GLF, found in bathtub unknown downtime. Unresponsive on arrival GCS of 12. Per EMS appears to have elevated t waves.     10mL of calcium chloride administered and 1 treatment of albuterol. 300mL of NS. 6L of NC    Hx of multiple ground level falls, DM 1  Unknown meds  Unknown Allergies

## 2020-12-06 NOTE — DISCHARGE PLANNING
Care Transition Team Assessment    In the case of an emergency, pt's legal NOK is spouse, Kelle [phone]    LSW obtained information in this assessment via telephone conversation w/ pt's spouse, Kelle Gonzalez. Pt resides w/ spouse in a one-story home in [place], [place]. Spouse states that the pt keeps falling because pt refuses to use the walker that pt has at home. Spouse was emotional on the phone stating that she cannot  the pt each time he falls and she is tired of calling EMS. Spouse further reports that the pt suffers from PTSD and sleep in his chair in living room due to nightmares. [facility] but spouse doesn't know what they are doing for the pt's PTSD. Pt has a son and grandchildren, but the son moved out with the grandchildren a year ago and there has been no contact since then. Pt has a hx of ETOH and opioid addiction but spouse says pt supposedly quit a year ago after spouse threatened to leave. Spouse states that she has to lock up her medications in a locked safe so the pt doesn't get into them. [facility] but the wife states that the pt's medications are usually always late resulting in pt having to wait a few days for medications. Wife does drive and can  the pt when needed.       Information Source  Orientation : Unable to Assess  Information Given By: Spouse  Informant's Name: Kelle Gonzalez  Who is responsible for making decisions for patient? : Patient    Readmission Evaluation  Is this a readmission?: No    Interdisciplinary Discharge Planning  Support Systems: Spouse / Significant Other, Other (Comments)(Pt's son hasn't spoken to pt in over a year)  Housing / Facility: 1 Story House  Mobility Issues: Yes  Durable Medical Equipment: Walker(Spouse reports pt refuses to use walker)    Discharge Preparedness  What is your plan after discharge?: Uncertain - pending medical team collaboration  What are your  discharge supports?: Spouse  Prior Functional Level: Needs Assist with Activities of Daily Living, Needs Assist with Medication Management, Uses Walker  Difficulity with ADLs: Bathing, Brushing teeth, Dressing, Eating, Toileting, Walking  Difficulty with ADLs Comment: Wife reports the pt is not motivated to do much of anything.  Difficulity with IADLs: Cooking, Driving, Keeping track of finances, Laundry, Managing medication, Shopping    Functional Assesment  Prior Functional Level: Needs Assist with Activities of Daily Living, Needs Assist with Medication Management, Uses Walker    Finances  Financial Barriers to Discharge: No  Prescription Coverage: Yes  Prescription Coverage Comments: [facility]    Advance Directive  Advance Directive?: None    Psychological Assessment  History of Substance Abuse: Alcohol  Date Last Used - Alcohol: 12/6/2020  Substance Abuse Comments: Wife states the pt says he quit drinking and pain pills a year ago after spouse threatened to leave him  History of Psychiatric Problems: Yes(Pt suffers from PTSD but doesn't go to therapy for it.)  Non-compliant with Treatment: Yes  Newly Diagnosed Illness: No    Discharge Risks or Barriers  Discharge risks or barriers?: Substance abuse, Mental health, Complex medical needs, Non-adherence to medication or treatment  Patient risk factors: Assaultive / Combative, Bariatric, Cognitive / sensory / physical deficit, Complex medical needs, Mental health, Multiple organizational systems involved, Noncompliance, Substance abuse    Anticipated Discharge Information  Discharge Disposition: Still a Patient (30)  Discharge Address: [facility] [place] [address]  Discharge Contact Phone Number: [phone]

## 2020-12-11 LAB
BACTERIA BLD CULT: NORMAL
BACTERIA BLD CULT: NORMAL
SIGNIFICANT IND 70042: NORMAL
SIGNIFICANT IND 70042: NORMAL
SITE SITE: NORMAL
SITE SITE: NORMAL
SOURCE SOURCE: NORMAL
SOURCE SOURCE: NORMAL

## 2021-02-12 ENCOUNTER — OFFICE VISIT (OUTPATIENT)
Dept: URGENT CARE | Facility: PHYSICIAN GROUP | Age: 66
End: 2021-02-12
Payer: OTHER GOVERNMENT

## 2021-02-12 VITALS
RESPIRATION RATE: 18 BRPM | BODY MASS INDEX: 43 KG/M2 | DIASTOLIC BLOOD PRESSURE: 86 MMHG | SYSTOLIC BLOOD PRESSURE: 132 MMHG | TEMPERATURE: 96.9 F | HEIGHT: 67 IN | WEIGHT: 274 LBS | OXYGEN SATURATION: 94 % | HEART RATE: 113 BPM

## 2021-02-12 DIAGNOSIS — H10.33 ACUTE BACTERIAL CONJUNCTIVITIS OF BOTH EYES: ICD-10-CM

## 2021-02-12 PROCEDURE — 99213 OFFICE O/P EST LOW 20 MIN: CPT | Performed by: FAMILY MEDICINE

## 2021-02-12 RX ORDER — ERYTHROMYCIN 5 MG/G
OINTMENT OPHTHALMIC
Qty: 3.5 G | Refills: 2 | Status: SHIPPED | OUTPATIENT
Start: 2021-02-12 | End: 2023-07-19

## 2021-02-12 RX ORDER — OFLOXACIN 3 MG/ML
SOLUTION/ DROPS OPHTHALMIC
Qty: 5 ML | Refills: 2 | Status: SHIPPED | OUTPATIENT
Start: 2021-02-12 | End: 2023-07-19

## 2021-02-12 ASSESSMENT — FIBROSIS 4 INDEX: FIB4 SCORE: 0.88

## 2021-02-12 NOTE — PROGRESS NOTES
Chief Complaint:    Chief Complaint   Patient presents with   • Eye Problem     poss infection in both eyes, eye drops given by base not working        History of Present Illness:    This is a new problem. For 2 days, he has redness of both eyes, swelling of eyelids, and crusting of eyelids. He has been using Cromolyn eye drops without help. He reports he has had similar problem in the past that improved with topical ointment.      Review of Systems:    Constitutional: Negative for fever, chills, and diaphoresis.   Eyes: See HPI.  ENT: Negative for ear pain, ear discharge, hearing loss, tinnitus, nasal congestion, nosebleeds, and sore throat.    Respiratory: Negative for cough, hemoptysis, sputum production, shortness of breath, wheezing, and stridor.    Cardiovascular: Negative for chest pain, palpitations, orthopnea, claudication, leg swelling, and PND.   Gastrointestinal: Negative for abdominal pain, nausea, vomiting, diarrhea, constipation, blood in stool, and melena.   Genitourinary: No new symptoms.  Musculoskeletal: No new symptoms.  Skin: Negative for rash and itching.   Neurological: Negative for dizziness, tingling, tremors, sensory change, speech change, focal weakness, seizures, loss of consciousness, and headaches.   Endo: Negative for polydipsia.   Heme: Does not bruise/bleed easily.   Psychiatric/Behavioral: No new symptoms.      Past Medical History:    Past Medical History:   Diagnosis Date   • BPH (benign prostatic hyperplasia)    • Chronic low back pain     DDD   • DDD (degenerative disc disease)    • Depression    • Hypertension    • Morbid obesity (HCC)    • Nephrolithiasis      Past Surgical History:    Past Surgical History:   Procedure Laterality Date   • GASTRIC BYPASS LAPAROSCOPIC  2006     Social History:    Social History     Socioeconomic History   • Marital status:      Spouse name: Not on file   • Number of children: Not on file   • Years of education: Not on file   • Highest  education level: Not on file   Occupational History   • Not on file   Tobacco Use   • Smoking status: Never Smoker   • Smokeless tobacco: Never Used   Substance and Sexual Activity   • Alcohol use: Yes     Alcohol/week: 0.5 oz     Types: 1 Shots of liquor per week     Comment: day   • Drug use: No   • Sexual activity: Yes   Other Topics Concern   • Not on file   Social History Narrative   • Not on file     Social Determinants of Health     Financial Resource Strain:    • Difficulty of Paying Living Expenses:    Food Insecurity:    • Worried About Running Out of Food in the Last Year:    • Ran Out of Food in the Last Year:    Transportation Needs:    • Lack of Transportation (Medical):    • Lack of Transportation (Non-Medical):    Physical Activity:    • Days of Exercise per Week:    • Minutes of Exercise per Session:    Stress:    • Feeling of Stress :    Social Connections:    • Frequency of Communication with Friends and Family:    • Frequency of Social Gatherings with Friends and Family:    • Attends Judaism Services:    • Active Member of Clubs or Organizations:    • Attends Club or Organization Meetings:    • Marital Status:    Intimate Partner Violence:    • Fear of Current or Ex-Partner:    • Emotionally Abused:    • Physically Abused:    • Sexually Abused:      Family History:    Family History   Problem Relation Age of Onset   • Lung Disease Mother    • Heart Disease Father    • Lung Disease Father    • Alcohol/Drug Father      Medications:    Current Outpatient Medications on File Prior to Visit   Medication Sig Dispense Refill   • TAMSULOSIN HCL PO Take  by mouth.     • lisinopril (PRINIVIL) 20 MG Tab Take 1 Tab by mouth every day. 30 Tab 1   • amLODIPine (NORVASC) 5 MG Tab Take 1 Tab by mouth every day. 30 Tab 1   • allopurinol (ZYLOPRIM) 100 MG Tab Take 2 Tabs by mouth every day. 30 Tab 0   • calcium carbonate (OS-FAROOQ 500) 1250 (500 Ca) MG Tab Take 1 Tab by mouth 3 times a day, with meals. 30 Tab 1  "  • gabapentin (NEURONTIN) 300 MG Cap Take 900 mg by mouth 3 times a day.  1   • metformin (GLUCOPHAGE) 1000 MG tablet Take 1,000 mg by mouth 2 times a day, with meals.     • QUEtiapine (SEROQUEL) 100 MG Tab Take 100 mg by mouth every bedtime.     • duloxetine (CYMBALTA) 30 MG CPEP Take 2 Caps by mouth every day. 30 Each 3     No current facility-administered medications on file prior to visit.     Allergies:    No Known Allergies      Vitals:    Vitals:    02/12/21 1345   BP: 132/86   Pulse: (!) 113   Resp: 18   Temp: 36.1 °C (96.9 °F)   SpO2: 94%   Weight: 124 kg (274 lb)   Height: 1.702 m (5' 7\")       Physical Exam:    Constitutional: Vital signs reviewed. Appears well-developed and well-nourished. No acute distress.   Eyes: Bilateral eyelids are mildly swollen, moderately erythematous and with a lot of purulent crusting. Bilateral conjunctivae are mildly-moderately injected. PERRLA.  ENT: External ears normal. Hearing normal.   Neck: Neck supple.   Pulmonary/Chest: Respirations non-labored.   Musculoskeletal: Normal gait.   Neurological: Alert and oriented to person, place, and time. CN 2-12 intact. Muscle tone normal. Coordination normal.   Skin: No rashes or lesions. Warm, dry, normal turgor.  Psychiatric: Normal mood and affect. Behavior is normal. Judgment and thought content normal.       Assessment / Plan:    1. Acute bacterial conjunctivitis of both eyes  - erythromycin 5 MG/GM Ointment; APPLY TO EYES/EYELIDS EVERY 4 HOURS WHILE AWAKE UNTIL BETTER.  Dispense: 3.5 g; Refill: 2  - ofloxacin (OCUFLOX) 0.3 % Solution; 1-2 DROPS TO BOTH EYES EVERY 2-4 HOURS WHILE AWAKE. USE UNTIL BETTER AND FOR ONE EXTRA DAY AFTER BETTER.  Dispense: 5 mL; Refill: 2      Discussed with him DDX, management options, and risks, benefits, and alternatives to treatment plan agreed upon.    Agreeable to medications prescribed.    Discussed expected course of duration, time for improvement, and to seek follow-up in Emergency Room, " urgent care, or with PCP if getting worse at any time or not improving within expected time frame.

## 2023-07-19 ENCOUNTER — OFFICE VISIT (OUTPATIENT)
Dept: MEDICAL GROUP | Facility: PHYSICIAN GROUP | Age: 68
End: 2023-07-19
Payer: MEDICARE

## 2023-07-19 VITALS
BODY MASS INDEX: 41.04 KG/M2 | HEART RATE: 80 BPM | SYSTOLIC BLOOD PRESSURE: 136 MMHG | OXYGEN SATURATION: 94 % | WEIGHT: 262 LBS | TEMPERATURE: 98 F | DIASTOLIC BLOOD PRESSURE: 80 MMHG

## 2023-07-19 DIAGNOSIS — E11.42 TYPE 2 DIABETES MELLITUS WITH DIABETIC POLYNEUROPATHY, WITHOUT LONG-TERM CURRENT USE OF INSULIN (HCC): Chronic | ICD-10-CM

## 2023-07-19 DIAGNOSIS — G47.09 OTHER INSOMNIA: ICD-10-CM

## 2023-07-19 DIAGNOSIS — M51.37 DDD (DEGENERATIVE DISC DISEASE), LUMBOSACRAL: ICD-10-CM

## 2023-07-19 DIAGNOSIS — G47.33 OSA ON CPAP: Chronic | ICD-10-CM

## 2023-07-19 DIAGNOSIS — Z76.89 ENCOUNTER TO ESTABLISH CARE: ICD-10-CM

## 2023-07-19 DIAGNOSIS — Z11.59 NEED FOR HEPATITIS C SCREENING TEST: ICD-10-CM

## 2023-07-19 DIAGNOSIS — Z12.5 PROSTATE CANCER SCREENING: ICD-10-CM

## 2023-07-19 DIAGNOSIS — Z12.11 COLON CANCER SCREENING: ICD-10-CM

## 2023-07-19 DIAGNOSIS — N40.0 BENIGN PROSTATIC HYPERPLASIA, UNSPECIFIED WHETHER LOWER URINARY TRACT SYMPTOMS PRESENT: ICD-10-CM

## 2023-07-19 DIAGNOSIS — Z11.4 ENCOUNTER FOR SCREENING FOR HIV: ICD-10-CM

## 2023-07-19 DIAGNOSIS — I10 ESSENTIAL HYPERTENSION: Chronic | ICD-10-CM

## 2023-07-19 DIAGNOSIS — M1A.9XX0 CHRONIC GOUT WITHOUT TOPHUS, UNSPECIFIED CAUSE, UNSPECIFIED SITE: ICD-10-CM

## 2023-07-19 PROBLEM — E83.42 HYPOMAGNESEMIA: Status: RESOLVED | Noted: 2019-04-25 | Resolved: 2023-07-19

## 2023-07-19 PROBLEM — R79.89 TROPONIN LEVEL ELEVATED: Status: RESOLVED | Noted: 2019-04-24 | Resolved: 2023-07-19

## 2023-07-19 PROBLEM — J96.01 ACUTE HYPOXEMIC RESPIRATORY FAILURE (HCC): Chronic | Status: ACTIVE | Noted: 2019-04-28

## 2023-07-19 PROBLEM — R91.1 LUNG NODULE: Chronic | Status: ACTIVE | Noted: 2019-04-24

## 2023-07-19 PROBLEM — E87.29 INCREASED ANION GAP METABOLIC ACIDOSIS: Status: RESOLVED | Noted: 2019-04-24 | Resolved: 2023-07-19

## 2023-07-19 PROBLEM — R91.1 LUNG NODULE: Chronic | Status: RESOLVED | Noted: 2019-04-24 | Resolved: 2023-07-19

## 2023-07-19 PROBLEM — J96.01 ACUTE HYPOXEMIC RESPIRATORY FAILURE (HCC): Chronic | Status: RESOLVED | Noted: 2019-04-28 | Resolved: 2023-07-19

## 2023-07-19 PROBLEM — H10.9 CONJUNCTIVITIS: Status: RESOLVED | Noted: 2019-04-25 | Resolved: 2023-07-19

## 2023-07-19 PROBLEM — F10.920 ALCOHOLIC INTOXICATION WITHOUT COMPLICATION (HCC): Chronic | Status: RESOLVED | Noted: 2019-04-24 | Resolved: 2023-07-19

## 2023-07-19 PROBLEM — S22.42XD CLOSED FRACTURE OF MULTIPLE RIBS OF LEFT SIDE WITH ROUTINE HEALING: Status: RESOLVED | Noted: 2019-04-24 | Resolved: 2023-07-19

## 2023-07-19 PROBLEM — I48.91 AF (ATRIAL FIBRILLATION) (HCC): Chronic | Status: ACTIVE | Noted: 2019-04-24

## 2023-07-19 PROBLEM — Z98.84 HISTORY OF GASTRIC BYPASS: Status: ACTIVE | Noted: 2023-07-19

## 2023-07-19 PROBLEM — E87.6 HYPOKALEMIA: Status: RESOLVED | Noted: 2019-04-24 | Resolved: 2023-07-19

## 2023-07-19 PROBLEM — F10.920 ALCOHOLIC INTOXICATION WITHOUT COMPLICATION (HCC): Chronic | Status: ACTIVE | Noted: 2019-04-24

## 2023-07-19 PROBLEM — I48.91 AF (ATRIAL FIBRILLATION) (HCC): Chronic | Status: RESOLVED | Noted: 2019-04-24 | Resolved: 2023-07-19

## 2023-07-19 PROBLEM — E83.51 HYPOCALCEMIA: Status: RESOLVED | Noted: 2019-04-25 | Resolved: 2023-07-19

## 2023-07-19 PROCEDURE — 3079F DIAST BP 80-89 MM HG: CPT | Performed by: STUDENT IN AN ORGANIZED HEALTH CARE EDUCATION/TRAINING PROGRAM

## 2023-07-19 PROCEDURE — 99215 OFFICE O/P EST HI 40 MIN: CPT | Performed by: STUDENT IN AN ORGANIZED HEALTH CARE EDUCATION/TRAINING PROGRAM

## 2023-07-19 PROCEDURE — 3075F SYST BP GE 130 - 139MM HG: CPT | Performed by: STUDENT IN AN ORGANIZED HEALTH CARE EDUCATION/TRAINING PROGRAM

## 2023-07-19 RX ORDER — SEMAGLUTIDE 1.34 MG/ML
INJECTION, SOLUTION SUBCUTANEOUS
COMMUNITY

## 2023-07-19 RX ORDER — ALLOPURINOL 100 MG/1
200 TABLET ORAL DAILY
Qty: 180 TABLET | Refills: 1 | Status: SHIPPED | OUTPATIENT
Start: 2023-07-19

## 2023-07-19 RX ORDER — TRAZODONE HYDROCHLORIDE 100 MG/1
100 TABLET ORAL NIGHTLY
Qty: 60 TABLET | Refills: 0 | Status: SHIPPED | OUTPATIENT
Start: 2023-07-19

## 2023-07-19 ASSESSMENT — PATIENT HEALTH QUESTIONNAIRE - PHQ9: CLINICAL INTERPRETATION OF PHQ2 SCORE: 0
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
## 2023-07-20 PROBLEM — G47.09 OTHER INSOMNIA: Status: ACTIVE | Noted: 2023-07-20

## 2023-07-20 RX ORDER — FINASTERIDE 5 MG/1
5 TABLET, FILM COATED ORAL DAILY
COMMUNITY

## 2023-07-20 RX ORDER — GLIPIZIDE 5 MG/1
5 TABLET ORAL DAILY
COMMUNITY

## 2023-07-20 NOTE — ASSESSMENT & PLAN NOTE
Patient reports difficulty with sleep due to chronic back pain.  Patient is currently taking Seroquel 150 mg at bedtime but reports it is no longer working.  Patient tried trazodone in the past and is agreeable to try it again.

## 2023-07-20 NOTE — ASSESSMENT & PLAN NOTE
Patient is unable to recall his last A1c.  Currently on Ozempic 0.25 mg weekly and glipizide 5 mg daily.  Patient reports his eye exam was done last year and he will go again in the fall.
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
## 2023-09-05 ENCOUNTER — TELEPHONE (OUTPATIENT)
Dept: URGENT CARE | Facility: PHYSICIAN GROUP | Age: 68
End: 2023-09-05
Payer: MEDICARE

## 2023-09-05 NOTE — LETTER
9/5/2023            Jarek Gonzalez  [address] [place] [address]              Dear Jarek,    Your care is very important to us, and we have noticed that on 08/29/2023, you missed your appointment with Jen Rascon M.D. at [facility]       We’re committed to providing you with the best care possible. Your appointment time is reserved for you and your provider to discuss any current or new health concerns and, together, determine the best plan of care for you. Please call [phone] to reschedule at your earliest convenience.        In some cases, [facility] offers additional resources to make your healthcare more accessible, including transportation assistance, financial assistance and virtual visits. To learn more about these resources, please call [phone].       In order to keep you as informed as possible, below is a brief summary of our policy regarding missed appointments:        If a patient “No Shows”??three (3) or more appointments within a rolling 12-month           period, they may be dismissed from the practice for failure to follow clinician      recommendations.     If you have any concerns regarding the care you are receiving, please talk with your provider or call the office at [phone] and request to speak with the Practice . We’re committed to providing excellent care, and your feedback is invaluable.          Sincerely,  Jen Rascon M.D.

## 2023-10-12 PROBLEM — G89.4 CHRONIC PAIN SYNDROME: Chronic | Status: ACTIVE | Noted: 2023-10-12

## 2023-10-12 PROBLEM — G89.4 CHRONIC PAIN SYNDROME: Status: ACTIVE | Noted: 2023-10-12
